# Patient Record
Sex: MALE | Race: WHITE | NOT HISPANIC OR LATINO | Employment: UNEMPLOYED | URBAN - NONMETROPOLITAN AREA
[De-identification: names, ages, dates, MRNs, and addresses within clinical notes are randomized per-mention and may not be internally consistent; named-entity substitution may affect disease eponyms.]

---

## 2018-02-22 ENCOUNTER — APPOINTMENT (OUTPATIENT)
Dept: NUCLEAR MEDICINE | Facility: HOSPITAL | Age: 43
End: 2018-02-22

## 2018-02-22 ENCOUNTER — APPOINTMENT (OUTPATIENT)
Dept: GENERAL RADIOLOGY | Facility: HOSPITAL | Age: 43
End: 2018-02-22

## 2018-02-22 ENCOUNTER — HOSPITAL ENCOUNTER (INPATIENT)
Facility: HOSPITAL | Age: 43
LOS: 4 days | Discharge: HOME HEALTH | End: 2018-02-27
Attending: EMERGENCY MEDICINE | Admitting: HOSPITALIST

## 2018-02-22 DIAGNOSIS — J96.01 ACUTE RESPIRATORY FAILURE WITH HYPOXIA AND HYPERCAPNIA (HCC): Primary | ICD-10-CM

## 2018-02-22 DIAGNOSIS — J18.9 COMMUNITY ACQUIRED PNEUMONIA, UNSPECIFIED LATERALITY: ICD-10-CM

## 2018-02-22 DIAGNOSIS — J96.02 ACUTE RESPIRATORY FAILURE WITH HYPOXIA AND HYPERCAPNIA (HCC): Primary | ICD-10-CM

## 2018-02-22 LAB
A-A DO2: 24 MMHG (ref 0–300)
A-A DO2: 55.6 MMHG (ref 0–300)
ALBUMIN SERPL-MCNC: 3.8 G/DL (ref 3.5–5)
ALBUMIN/GLOB SERPL: 1.5 G/DL (ref 1.5–2.5)
ALP SERPL-CCNC: 87 U/L (ref 40–129)
ALT SERPL W P-5'-P-CCNC: 20 U/L (ref 10–44)
ANION GAP SERPL CALCULATED.3IONS-SCNC: 4.9 MMOL/L (ref 3.6–11.2)
ARTERIAL PATENCY WRIST A: POSITIVE
ARTERIAL PATENCY WRIST A: POSITIVE
AST SERPL-CCNC: 16 U/L (ref 10–34)
ATMOSPHERIC PRESS: 728 MMHG
ATMOSPHERIC PRESS: 728 MMHG
BACTERIA UR QL AUTO: ABNORMAL /HPF
BASE EXCESS BLDA CALC-SCNC: 0.2 MMOL/L
BASE EXCESS BLDA CALC-SCNC: 1.5 MMOL/L
BASOPHILS # BLD AUTO: 0.07 10*3/MM3 (ref 0–0.3)
BASOPHILS NFR BLD AUTO: 0.7 % (ref 0–2)
BDY SITE: ABNORMAL
BDY SITE: ABNORMAL
BILIRUB SERPL-MCNC: 0.2 MG/DL (ref 0.2–1.8)
BILIRUB UR QL STRIP: NEGATIVE
BNP SERPL-MCNC: 322 PG/ML (ref 0–100)
BODY TEMPERATURE: 98.6 C
BODY TEMPERATURE: 98.6 C
BUN BLD-MCNC: 15 MG/DL (ref 7–21)
BUN/CREAT SERPL: 31.9 (ref 7–25)
CALCIUM SPEC-SCNC: 8.4 MG/DL (ref 7.7–10)
CHLORIDE SERPL-SCNC: 96 MMOL/L (ref 99–112)
CLARITY UR: ABNORMAL
CO2 SERPL-SCNC: 30.1 MMOL/L (ref 24.3–31.9)
COHGB MFR BLD: 1.7 % (ref 0–5)
COHGB MFR BLD: 2 % (ref 0–5)
COLOR UR: ABNORMAL
CREAT BLD-MCNC: 0.47 MG/DL (ref 0.43–1.29)
D DIMER PPP FEU-MCNC: 0.7 MCGFEU/ML (ref 0–0.5)
D-LACTATE SERPL-SCNC: 0.7 MMOL/L (ref 0.5–2)
DEPRECATED RDW RBC AUTO: 49.1 FL (ref 37–54)
EOSINOPHIL # BLD AUTO: 0.11 10*3/MM3 (ref 0–0.7)
EOSINOPHIL NFR BLD AUTO: 1 % (ref 0–5)
ERYTHROCYTE [DISTWIDTH] IN BLOOD BY AUTOMATED COUNT: 16.6 % (ref 11.5–14.5)
FLUAV AG NPH QL: NEGATIVE
FLUBV AG NPH QL IA: NEGATIVE
GFR SERPL CREATININE-BSD FRML MDRD: >150 ML/MIN/1.73
GLOBULIN UR ELPH-MCNC: 2.5 GM/DL
GLUCOSE BLD-MCNC: 197 MG/DL (ref 70–110)
GLUCOSE UR STRIP-MCNC: ABNORMAL MG/DL
HCO3 BLDA-SCNC: 27.1 MMOL/L (ref 22–26)
HCO3 BLDA-SCNC: 29.4 MMOL/L (ref 22–26)
HCT VFR BLD AUTO: 36.1 % (ref 42–52)
HCT VFR BLD CALC: 37 % (ref 42–52)
HCT VFR BLD CALC: 38 % (ref 42–52)
HGB BLD-MCNC: 11.2 G/DL (ref 14–18)
HGB BLDA-MCNC: 12.7 G/DL (ref 12–16)
HGB BLDA-MCNC: 13 G/DL (ref 12–16)
HGB UR QL STRIP.AUTO: ABNORMAL
HOROWITZ INDEX BLD+IHG-RTO: 21 %
HOROWITZ INDEX BLD+IHG-RTO: 28 %
HYALINE CASTS UR QL AUTO: ABNORMAL /LPF
IMM GRANULOCYTES # BLD: 0.13 10*3/MM3 (ref 0–0.03)
IMM GRANULOCYTES NFR BLD: 1.2 % (ref 0–0.5)
KETONES UR QL STRIP: NEGATIVE
LEUKOCYTE ESTERASE UR QL STRIP.AUTO: ABNORMAL
LIPASE SERPL-CCNC: 26 U/L (ref 13–60)
LYMPHOCYTES # BLD AUTO: 1.4 10*3/MM3 (ref 1–3)
LYMPHOCYTES NFR BLD AUTO: 13.3 % (ref 21–51)
MCH RBC QN AUTO: 25.1 PG (ref 27–33)
MCHC RBC AUTO-ENTMCNC: 31 G/DL (ref 33–37)
MCV RBC AUTO: 80.9 FL (ref 80–94)
METHGB BLD QL: 0.4 % (ref 0–3)
METHGB BLD QL: 0.5 % (ref 0–3)
MODALITY: ABNORMAL
MODALITY: ABNORMAL
MONOCYTES # BLD AUTO: 0.95 10*3/MM3 (ref 0.1–0.9)
MONOCYTES NFR BLD AUTO: 9 % (ref 0–10)
NEUTROPHILS # BLD AUTO: 7.85 10*3/MM3 (ref 1.4–6.5)
NEUTROPHILS NFR BLD AUTO: 74.8 % (ref 30–70)
NITRITE UR QL STRIP: NEGATIVE
OSMOLALITY SERPL CALC.SUM OF ELEC: 269 MOSM/KG (ref 273–305)
OXYHGB MFR BLDV: 84.5 % (ref 85–100)
OXYHGB MFR BLDV: 87.2 % (ref 85–100)
PCO2 BLDA: 53.6 MM HG (ref 35–45)
PCO2 BLDA: 61.7 MM HG (ref 35–45)
PH BLDA: 7.3 PH UNITS (ref 7.35–7.45)
PH BLDA: 7.32 PH UNITS (ref 7.35–7.45)
PH UR STRIP.AUTO: <=5 [PH] (ref 5–8)
PHENYTOIN SERPL-MCNC: <0.5 MCG/ML (ref 10–20)
PLATELET # BLD AUTO: 178 10*3/MM3 (ref 130–400)
PMV BLD AUTO: 10.3 FL (ref 6–10)
PO2 BLDA: 54.8 MM HG (ref 80–100)
PO2 BLDA: 62.3 MM HG (ref 80–100)
POTASSIUM BLD-SCNC: 4.3 MMOL/L (ref 3.5–5.3)
PROT SERPL-MCNC: 6.3 G/DL (ref 6–8)
PROT UR QL STRIP: ABNORMAL
RBC # BLD AUTO: 4.46 10*6/MM3 (ref 4.7–6.1)
RBC # UR: ABNORMAL /HPF
REF LAB TEST METHOD: ABNORMAL
SAO2 % BLDCOA: 86.3 % (ref 90–100)
SAO2 % BLDCOA: 89.4 % (ref 90–100)
SODIUM BLD-SCNC: 131 MMOL/L (ref 135–153)
SP GR UR STRIP: 1.02 (ref 1–1.03)
SQUAMOUS #/AREA URNS HPF: ABNORMAL /HPF
TROPONIN I SERPL-MCNC: 0.05 NG/ML
UROBILINOGEN UR QL STRIP: ABNORMAL
WBC NRBC COR # BLD: 10.51 10*3/MM3 (ref 4.5–12.5)
WBC UR QL AUTO: ABNORMAL /HPF

## 2018-02-22 PROCEDURE — 25010000002 METHYLPREDNISOLONE PER 125 MG: Performed by: EMERGENCY MEDICINE

## 2018-02-22 PROCEDURE — 83880 ASSAY OF NATRIURETIC PEPTIDE: CPT | Performed by: EMERGENCY MEDICINE

## 2018-02-22 PROCEDURE — 78582 LUNG VENTILAT&PERFUS IMAGING: CPT

## 2018-02-22 PROCEDURE — 36600 WITHDRAWAL OF ARTERIAL BLOOD: CPT | Performed by: EMERGENCY MEDICINE

## 2018-02-22 PROCEDURE — 25010000002 CEFTRIAXONE: Performed by: EMERGENCY MEDICINE

## 2018-02-22 PROCEDURE — 81001 URINALYSIS AUTO W/SCOPE: CPT | Performed by: EMERGENCY MEDICINE

## 2018-02-22 PROCEDURE — 78582 LUNG VENTILAT&PERFUS IMAGING: CPT | Performed by: RADIOLOGY

## 2018-02-22 PROCEDURE — 87040 BLOOD CULTURE FOR BACTERIA: CPT | Performed by: EMERGENCY MEDICINE

## 2018-02-22 PROCEDURE — 83690 ASSAY OF LIPASE: CPT | Performed by: EMERGENCY MEDICINE

## 2018-02-22 PROCEDURE — 84484 ASSAY OF TROPONIN QUANT: CPT | Performed by: EMERGENCY MEDICINE

## 2018-02-22 PROCEDURE — 0 TECHNETIUM ALBUMIN AGGREGATED: Performed by: EMERGENCY MEDICINE

## 2018-02-22 PROCEDURE — 83605 ASSAY OF LACTIC ACID: CPT | Performed by: EMERGENCY MEDICINE

## 2018-02-22 PROCEDURE — 87086 URINE CULTURE/COLONY COUNT: CPT | Performed by: EMERGENCY MEDICINE

## 2018-02-22 PROCEDURE — 94799 UNLISTED PULMONARY SVC/PX: CPT

## 2018-02-22 PROCEDURE — 82375 ASSAY CARBOXYHB QUANT: CPT | Performed by: EMERGENCY MEDICINE

## 2018-02-22 PROCEDURE — 87186 SC STD MICRODIL/AGAR DIL: CPT | Performed by: EMERGENCY MEDICINE

## 2018-02-22 PROCEDURE — 80185 ASSAY OF PHENYTOIN TOTAL: CPT | Performed by: EMERGENCY MEDICINE

## 2018-02-22 PROCEDURE — 83050 HGB METHEMOGLOBIN QUAN: CPT | Performed by: EMERGENCY MEDICINE

## 2018-02-22 PROCEDURE — 85379 FIBRIN DEGRADATION QUANT: CPT | Performed by: EMERGENCY MEDICINE

## 2018-02-22 PROCEDURE — 71045 X-RAY EXAM CHEST 1 VIEW: CPT

## 2018-02-22 PROCEDURE — 80053 COMPREHEN METABOLIC PANEL: CPT | Performed by: EMERGENCY MEDICINE

## 2018-02-22 PROCEDURE — 93010 ELECTROCARDIOGRAM REPORT: CPT | Performed by: INTERNAL MEDICINE

## 2018-02-22 PROCEDURE — 71045 X-RAY EXAM CHEST 1 VIEW: CPT | Performed by: RADIOLOGY

## 2018-02-22 PROCEDURE — 85025 COMPLETE CBC W/AUTO DIFF WBC: CPT | Performed by: EMERGENCY MEDICINE

## 2018-02-22 PROCEDURE — 87804 INFLUENZA ASSAY W/OPTIC: CPT | Performed by: EMERGENCY MEDICINE

## 2018-02-22 PROCEDURE — 93005 ELECTROCARDIOGRAM TRACING: CPT | Performed by: EMERGENCY MEDICINE

## 2018-02-22 PROCEDURE — 94640 AIRWAY INHALATION TREATMENT: CPT

## 2018-02-22 PROCEDURE — 82805 BLOOD GASES W/O2 SATURATION: CPT | Performed by: EMERGENCY MEDICINE

## 2018-02-22 PROCEDURE — 94660 CPAP INITIATION&MGMT: CPT

## 2018-02-22 PROCEDURE — A9540 TC99M MAA: HCPCS | Performed by: EMERGENCY MEDICINE

## 2018-02-22 PROCEDURE — 99285 EMERGENCY DEPT VISIT HI MDM: CPT

## 2018-02-22 RX ORDER — LACTULOSE 10 G/15ML
20 SOLUTION ORAL 2 TIMES DAILY
COMMUNITY
End: 2018-04-11

## 2018-02-22 RX ORDER — DANTROLENE SODIUM 25 MG/1
25 CAPSULE ORAL EVERY 12 HOURS SCHEDULED
Status: DISCONTINUED | OUTPATIENT
Start: 2018-02-22 | End: 2018-02-23

## 2018-02-22 RX ORDER — LEVETIRACETAM 750 MG/1
750 TABLET ORAL EVERY MORNING
Status: ON HOLD | COMMUNITY
End: 2018-02-27

## 2018-02-22 RX ORDER — IPRATROPIUM BROMIDE AND ALBUTEROL SULFATE 2.5; .5 MG/3ML; MG/3ML
SOLUTION RESPIRATORY (INHALATION)
Status: COMPLETED
Start: 2018-02-22 | End: 2018-02-22

## 2018-02-22 RX ORDER — PANTOPRAZOLE SODIUM 40 MG/1
40 TABLET, DELAYED RELEASE ORAL EVERY MORNING
COMMUNITY
End: 2018-05-02 | Stop reason: HOSPADM

## 2018-02-22 RX ORDER — CITALOPRAM 20 MG/1
20 TABLET ORAL NIGHTLY
Status: DISCONTINUED | OUTPATIENT
Start: 2018-02-22 | End: 2018-02-27 | Stop reason: HOSPADM

## 2018-02-22 RX ORDER — IPRATROPIUM BROMIDE AND ALBUTEROL SULFATE 2.5; .5 MG/3ML; MG/3ML
3 SOLUTION RESPIRATORY (INHALATION) ONCE
Status: COMPLETED | OUTPATIENT
Start: 2018-02-22 | End: 2018-02-22

## 2018-02-22 RX ORDER — METHYLPREDNISOLONE SODIUM SUCCINATE 125 MG/2ML
125 INJECTION, POWDER, LYOPHILIZED, FOR SOLUTION INTRAMUSCULAR; INTRAVENOUS ONCE
Status: COMPLETED | OUTPATIENT
Start: 2018-02-22 | End: 2018-02-22

## 2018-02-22 RX ORDER — SODIUM CHLORIDE 0.9 % (FLUSH) 0.9 %
10 SYRINGE (ML) INJECTION AS NEEDED
Status: DISCONTINUED | OUTPATIENT
Start: 2018-02-22 | End: 2018-02-27 | Stop reason: HOSPADM

## 2018-02-22 RX ORDER — CITALOPRAM 20 MG/1
20 TABLET ORAL NIGHTLY
COMMUNITY
End: 2018-05-02 | Stop reason: HOSPADM

## 2018-02-22 RX ORDER — BACLOFEN 20 MG/1
20 TABLET ORAL 2 TIMES DAILY
COMMUNITY
End: 2018-05-02 | Stop reason: HOSPADM

## 2018-02-22 RX ORDER — IPRATROPIUM BROMIDE AND ALBUTEROL SULFATE 2.5; .5 MG/3ML; MG/3ML
3 SOLUTION RESPIRATORY (INHALATION)
Status: COMPLETED | OUTPATIENT
Start: 2018-02-22 | End: 2018-02-22

## 2018-02-22 RX ORDER — OXCARBAZEPINE 300 MG/1
600 TABLET, FILM COATED ORAL EVERY 12 HOURS SCHEDULED
Status: DISCONTINUED | OUTPATIENT
Start: 2018-02-22 | End: 2018-02-23

## 2018-02-22 RX ORDER — OXCARBAZEPINE 300 MG/1
1200 TABLET, FILM COATED ORAL 2 TIMES DAILY
COMMUNITY
End: 2018-02-27 | Stop reason: HOSPADM

## 2018-02-22 RX ORDER — MEGESTROL ACETATE 40 MG/1
40 TABLET ORAL DAILY
COMMUNITY
End: 2018-02-23

## 2018-02-22 RX ORDER — LEVOTHYROXINE SODIUM 0.07 MG/1
75 TABLET ORAL EVERY MORNING
COMMUNITY
End: 2018-05-02 | Stop reason: HOSPADM

## 2018-02-22 RX ORDER — ATORVASTATIN CALCIUM 10 MG/1
10 TABLET, FILM COATED ORAL ONCE
Status: COMPLETED | OUTPATIENT
Start: 2018-02-22 | End: 2018-02-23

## 2018-02-22 RX ADMIN — IPRATROPIUM BROMIDE AND ALBUTEROL SULFATE 3 ML: .5; 3 SOLUTION RESPIRATORY (INHALATION) at 17:38

## 2018-02-22 RX ADMIN — IPRATROPIUM BROMIDE AND ALBUTEROL SULFATE 3 ML: .5; 3 SOLUTION RESPIRATORY (INHALATION) at 18:11

## 2018-02-22 RX ADMIN — METHYLPREDNISOLONE SODIUM SUCCINATE 125 MG: 125 INJECTION, POWDER, FOR SOLUTION INTRAMUSCULAR; INTRAVENOUS at 18:30

## 2018-02-22 RX ADMIN — IPRATROPIUM BROMIDE AND ALBUTEROL SULFATE 3 ML: .5; 3 SOLUTION RESPIRATORY (INHALATION) at 18:46

## 2018-02-22 RX ADMIN — Medication 1 DOSE: at 21:25

## 2018-02-22 RX ADMIN — DOXYCYCLINE 100 MG: 100 INJECTION, POWDER, LYOPHILIZED, FOR SOLUTION INTRAVENOUS at 23:41

## 2018-02-22 RX ADMIN — CEFTRIAXONE 1 G: 1 INJECTION, POWDER, FOR SOLUTION INTRAMUSCULAR; INTRAVENOUS at 22:17

## 2018-02-23 ENCOUNTER — APPOINTMENT (OUTPATIENT)
Dept: CT IMAGING | Facility: HOSPITAL | Age: 43
End: 2018-02-23

## 2018-02-23 ENCOUNTER — APPOINTMENT (OUTPATIENT)
Dept: GENERAL RADIOLOGY | Facility: HOSPITAL | Age: 43
End: 2018-02-23

## 2018-02-23 ENCOUNTER — APPOINTMENT (OUTPATIENT)
Dept: CARDIOLOGY | Facility: HOSPITAL | Age: 43
End: 2018-02-23
Attending: HOSPITALIST

## 2018-02-23 PROBLEM — J96.02 ACUTE RESPIRATORY FAILURE WITH HYPOXIA AND HYPERCAPNIA (HCC): Status: ACTIVE | Noted: 2018-02-23

## 2018-02-23 PROBLEM — J96.01 ACUTE RESPIRATORY FAILURE WITH HYPOXIA AND HYPERCAPNIA (HCC): Status: ACTIVE | Noted: 2018-02-23

## 2018-02-23 LAB
A-A DO2: 63.8 MMHG (ref 0–300)
A-A DO2: 63.8 MMHG (ref 0–300)
ALBUMIN SERPL-MCNC: 3.9 G/DL (ref 3.5–5)
ALBUMIN/GLOB SERPL: 1.6 G/DL (ref 1.5–2.5)
ALP SERPL-CCNC: 90 U/L (ref 40–129)
ALT SERPL W P-5'-P-CCNC: 19 U/L (ref 10–44)
ANION GAP SERPL CALCULATED.3IONS-SCNC: 7 MMOL/L (ref 3.6–11.2)
ARTERIAL PATENCY WRIST A: POSITIVE
ARTERIAL PATENCY WRIST A: POSITIVE
AST SERPL-CCNC: 17 U/L (ref 10–34)
ATMOSPHERIC PRESS: 728 MMHG
ATMOSPHERIC PRESS: 728 MMHG
BASE EXCESS BLDA CALC-SCNC: 0.5 MMOL/L
BASE EXCESS BLDA CALC-SCNC: 4.2 MMOL/L
BASOPHILS # BLD AUTO: 0.04 10*3/MM3 (ref 0–0.3)
BASOPHILS NFR BLD AUTO: 0.4 % (ref 0–2)
BDY SITE: ABNORMAL
BDY SITE: ABNORMAL
BH CV ECHO MEAS - % IVS THICK: 40.9 %
BH CV ECHO MEAS - % LVPW THICK: 28 %
BH CV ECHO MEAS - ACS: 1.7 CM
BH CV ECHO MEAS - AO MAX PG: 4.3 MMHG
BH CV ECHO MEAS - AO MEAN PG: 2.4 MMHG
BH CV ECHO MEAS - AO ROOT AREA (BSA CORRECTED): 1.6
BH CV ECHO MEAS - AO ROOT AREA: 6.4 CM^2
BH CV ECHO MEAS - AO ROOT DIAM: 2.9 CM
BH CV ECHO MEAS - AO V2 MAX: 103.2 CM/SEC
BH CV ECHO MEAS - AO V2 MEAN: 72.9 CM/SEC
BH CV ECHO MEAS - AO V2 VTI: 19.5 CM
BH CV ECHO MEAS - BSA(HAYCOCK): 1.9 M^2
BH CV ECHO MEAS - BSA: 1.8 M^2
BH CV ECHO MEAS - BZI_BMI: 26.6 KILOGRAMS/M^2
BH CV ECHO MEAS - BZI_METRIC_HEIGHT: 167.6 CM
BH CV ECHO MEAS - BZI_METRIC_WEIGHT: 74.8 KG
BH CV ECHO MEAS - EDV(CUBED): 122.4 ML
BH CV ECHO MEAS - EDV(TEICH): 116.3 ML
BH CV ECHO MEAS - EF(CUBED): 36.1 %
BH CV ECHO MEAS - EF(TEICH): 29.5 %
BH CV ECHO MEAS - ESV(CUBED): 78.1 ML
BH CV ECHO MEAS - ESV(TEICH): 81.9 ML
BH CV ECHO MEAS - FS: 13.9 %
BH CV ECHO MEAS - IVS/LVPW: 0.94
BH CV ECHO MEAS - IVSD: 1 CM
BH CV ECHO MEAS - IVSS: 1.4 CM
BH CV ECHO MEAS - LA DIMENSION: 2.3 CM
BH CV ECHO MEAS - LA/AO: 0.81
BH CV ECHO MEAS - LV MASS(C)D: 188.8 GRAMS
BH CV ECHO MEAS - LV MASS(C)DI: 102.4 GRAMS/M^2
BH CV ECHO MEAS - LV MASS(C)S: 228.1 GRAMS
BH CV ECHO MEAS - LV MASS(C)SI: 123.8 GRAMS/M^2
BH CV ECHO MEAS - LVIDD: 5 CM
BH CV ECHO MEAS - LVIDS: 4.3 CM
BH CV ECHO MEAS - LVOT AREA (M): 2.5 CM^2
BH CV ECHO MEAS - LVOT AREA: 2.5 CM^2
BH CV ECHO MEAS - LVOT DIAM: 1.8 CM
BH CV ECHO MEAS - LVPWD: 1.1 CM
BH CV ECHO MEAS - LVPWS: 1.4 CM
BH CV ECHO MEAS - MV A MAX VEL: 52.3 CM/SEC
BH CV ECHO MEAS - MV E MAX VEL: 42.4 CM/SEC
BH CV ECHO MEAS - MV E/A: 0.81
BH CV ECHO MEAS - PA ACC SLOPE: 771 CM/SEC^2
BH CV ECHO MEAS - PA ACC TIME: 0.12 SEC
BH CV ECHO MEAS - PA PR(ACCEL): 25.1 MMHG
BH CV ECHO MEAS - RAP SYSTOLE: 10 MMHG
BH CV ECHO MEAS - RVDD: 0.97 CM
BH CV ECHO MEAS - RVSP: 37.2 MMHG
BH CV ECHO MEAS - SI(AO): 68.2 ML/M^2
BH CV ECHO MEAS - SI(CUBED): 24 ML/M^2
BH CV ECHO MEAS - SI(TEICH): 18.6 ML/M^2
BH CV ECHO MEAS - SV(AO): 125.7 ML
BH CV ECHO MEAS - SV(CUBED): 44.2 ML
BH CV ECHO MEAS - SV(TEICH): 34.4 ML
BH CV ECHO MEAS - TR MAX VEL: 260.6 CM/SEC
BILIRUB SERPL-MCNC: 0.2 MG/DL (ref 0.2–1.8)
BNP SERPL-MCNC: 262 PG/ML (ref 0–100)
BODY TEMPERATURE: 98.6 C
BODY TEMPERATURE: 98.6 C
BUN BLD-MCNC: 8 MG/DL (ref 7–21)
BUN/CREAT SERPL: 17.8 (ref 7–25)
CALCIUM SPEC-SCNC: 8.8 MG/DL (ref 7.7–10)
CHLORIDE SERPL-SCNC: 100 MMOL/L (ref 99–112)
CK MB SERPL-CCNC: 0.93 NG/ML (ref 0–5)
CK MB SERPL-CCNC: 0.95 NG/ML (ref 0–5)
CK MB SERPL-CCNC: 1.85 NG/ML (ref 0–5)
CK MB SERPL-RTO: 5 % (ref 0–3)
CK MB SERPL-RTO: 5.5 % (ref 0–3)
CK MB SERPL-RTO: 6.4 % (ref 0–3)
CK SERPL-CCNC: 17 U/L (ref 24–204)
CK SERPL-CCNC: 19 U/L (ref 24–204)
CK SERPL-CCNC: 29 U/L (ref 24–204)
CO2 SERPL-SCNC: 28 MMOL/L (ref 24.3–31.9)
COHGB MFR BLD: 2 % (ref 0–5)
COHGB MFR BLD: 2.1 % (ref 0–5)
CREAT BLD-MCNC: 0.45 MG/DL (ref 0.43–1.29)
CRP SERPL-MCNC: 6.18 MG/DL (ref 0–0.99)
CRP SERPL-MCNC: 7.89 MG/DL (ref 0–0.99)
DEPRECATED RDW RBC AUTO: 48.4 FL (ref 37–54)
EOSINOPHIL # BLD AUTO: 0.04 10*3/MM3 (ref 0–0.7)
EOSINOPHIL NFR BLD AUTO: 0.4 % (ref 0–5)
EPAP: 8
ERYTHROCYTE [DISTWIDTH] IN BLOOD BY AUTOMATED COUNT: 16.6 % (ref 11.5–14.5)
GFR SERPL CREATININE-BSD FRML MDRD: >150 ML/MIN/1.73
GLOBULIN UR ELPH-MCNC: 2.5 GM/DL
GLUCOSE BLD-MCNC: 216 MG/DL (ref 70–110)
HBA1C MFR BLD: 6.9 % (ref 4.5–5.7)
HCO3 BLDA-SCNC: 26 MMOL/L (ref 22–26)
HCO3 BLDA-SCNC: 31.6 MMOL/L (ref 22–26)
HCT VFR BLD AUTO: 37.7 % (ref 42–52)
HCT VFR BLD CALC: 34 % (ref 42–52)
HCT VFR BLD CALC: 39 % (ref 42–52)
HGB BLD-MCNC: 11.5 G/DL (ref 14–18)
HGB BLDA-MCNC: 11.4 G/DL (ref 12–16)
HGB BLDA-MCNC: 13.3 G/DL (ref 12–16)
HOROWITZ INDEX BLD+IHG-RTO: 35 %
HOROWITZ INDEX BLD+IHG-RTO: 40 %
IMM GRANULOCYTES # BLD: 0.19 10*3/MM3 (ref 0–0.03)
IMM GRANULOCYTES NFR BLD: 2 % (ref 0–0.5)
IPAP: 18
L PNEUMO1 AG UR QL IA: NEGATIVE
LYMPHOCYTES # BLD AUTO: 0.86 10*3/MM3 (ref 1–3)
LYMPHOCYTES NFR BLD AUTO: 8.9 % (ref 21–51)
M PNEUMO IGM SER QL: NEGATIVE
MAXIMAL PREDICTED HEART RATE: 176 BPM
MCH RBC QN AUTO: 24.9 PG (ref 27–33)
MCHC RBC AUTO-ENTMCNC: 30.5 G/DL (ref 33–37)
MCV RBC AUTO: 81.8 FL (ref 80–94)
METHGB BLD QL: 0.2 % (ref 0–3)
METHGB BLD QL: 0.4 % (ref 0–3)
MODALITY: ABNORMAL
MODALITY: ABNORMAL
MONOCYTES # BLD AUTO: 0.58 10*3/MM3 (ref 0.1–0.9)
MONOCYTES NFR BLD AUTO: 6 % (ref 0–10)
MYOGLOBIN SERPL-MCNC: 13 NG/ML (ref 0–109)
MYOGLOBIN SERPL-MCNC: 15 NG/ML (ref 0–109)
MYOGLOBIN SERPL-MCNC: 25 NG/ML (ref 0–109)
NEUTROPHILS # BLD AUTO: 7.97 10*3/MM3 (ref 1.4–6.5)
NEUTROPHILS NFR BLD AUTO: 82.3 % (ref 30–70)
OSMOLALITY SERPL CALC.SUM OF ELEC: 275 MOSM/KG (ref 273–305)
OXYHGB MFR BLDV: 96.3 % (ref 85–100)
OXYHGB MFR BLDV: 96.6 % (ref 85–100)
PCO2 BLDA: 45 MM HG (ref 35–45)
PCO2 BLDA: 62 MM HG (ref 35–45)
PH BLDA: 7.33 PH UNITS (ref 7.35–7.45)
PH BLDA: 7.38 PH UNITS (ref 7.35–7.45)
PLATELET # BLD AUTO: 195 10*3/MM3 (ref 130–400)
PMV BLD AUTO: 10.6 FL (ref 6–10)
PO2 BLDA: 122.2 MM HG (ref 80–100)
PO2 BLDA: 137.3 MM HG (ref 80–100)
POTASSIUM BLD-SCNC: 4.5 MMOL/L (ref 3.5–5.3)
PROT SERPL-MCNC: 6.4 G/DL (ref 6–8)
RBC # BLD AUTO: 4.61 10*6/MM3 (ref 4.7–6.1)
SAO2 % BLDCOA: 98.7 % (ref 90–100)
SAO2 % BLDCOA: 98.9 % (ref 90–100)
SET MECH RESP RATE: 18
SODIUM BLD-SCNC: 135 MMOL/L (ref 135–153)
STRESS TARGET HR: 150 BPM
TROPONIN I SERPL-MCNC: 0.02 NG/ML
TROPONIN I SERPL-MCNC: 0.02 NG/ML
TROPONIN I SERPL-MCNC: 0.03 NG/ML
WBC NRBC COR # BLD: 9.68 10*3/MM3 (ref 4.5–12.5)

## 2018-02-23 PROCEDURE — 94799 UNLISTED PULMONARY SVC/PX: CPT

## 2018-02-23 PROCEDURE — 86738 MYCOPLASMA ANTIBODY: CPT | Performed by: HOSPITALIST

## 2018-02-23 PROCEDURE — 82553 CREATINE MB FRACTION: CPT | Performed by: HOSPITALIST

## 2018-02-23 PROCEDURE — 25010000002 HEPARIN (PORCINE) PER 1000 UNITS: Performed by: HOSPITALIST

## 2018-02-23 PROCEDURE — 25010000002 FUROSEMIDE PER 20 MG

## 2018-02-23 PROCEDURE — 86140 C-REACTIVE PROTEIN: CPT | Performed by: HOSPITALIST

## 2018-02-23 PROCEDURE — 84484 ASSAY OF TROPONIN QUANT: CPT | Performed by: HOSPITALIST

## 2018-02-23 PROCEDURE — 99223 1ST HOSP IP/OBS HIGH 75: CPT | Performed by: HOSPITALIST

## 2018-02-23 PROCEDURE — 83050 HGB METHEMOGLOBIN QUAN: CPT | Performed by: HOSPITALIST

## 2018-02-23 PROCEDURE — 25010000002 CEFTRIAXONE: Performed by: HOSPITALIST

## 2018-02-23 PROCEDURE — G8997 SWALLOW GOAL STATUS: HCPCS

## 2018-02-23 PROCEDURE — 36600 WITHDRAWAL OF ARTERIAL BLOOD: CPT | Performed by: HOSPITALIST

## 2018-02-23 PROCEDURE — 87899 AGENT NOS ASSAY W/OPTIC: CPT | Performed by: HOSPITALIST

## 2018-02-23 PROCEDURE — 82375 ASSAY CARBOXYHB QUANT: CPT | Performed by: HOSPITALIST

## 2018-02-23 PROCEDURE — 83874 ASSAY OF MYOGLOBIN: CPT | Performed by: HOSPITALIST

## 2018-02-23 PROCEDURE — 85025 COMPLETE CBC W/AUTO DIFF WBC: CPT | Performed by: HOSPITALIST

## 2018-02-23 PROCEDURE — 74230 X-RAY XM SWLNG FUNCJ C+: CPT

## 2018-02-23 PROCEDURE — 74230 X-RAY XM SWLNG FUNCJ C+: CPT | Performed by: RADIOLOGY

## 2018-02-23 PROCEDURE — 83880 ASSAY OF NATRIURETIC PEPTIDE: CPT | Performed by: HOSPITALIST

## 2018-02-23 PROCEDURE — 82805 BLOOD GASES W/O2 SATURATION: CPT | Performed by: HOSPITALIST

## 2018-02-23 PROCEDURE — G8998 SWALLOW D/C STATUS: HCPCS

## 2018-02-23 PROCEDURE — 25010000002 ONDANSETRON PER 1 MG

## 2018-02-23 PROCEDURE — 80177 DRUG SCRN QUAN LEVETIRACETAM: CPT | Performed by: HOSPITALIST

## 2018-02-23 PROCEDURE — 80183 DRUG SCRN QUANT OXCARBAZEPIN: CPT | Performed by: HOSPITALIST

## 2018-02-23 PROCEDURE — 82550 ASSAY OF CK (CPK): CPT | Performed by: HOSPITALIST

## 2018-02-23 PROCEDURE — 99222 1ST HOSP IP/OBS MODERATE 55: CPT | Performed by: INTERNAL MEDICINE

## 2018-02-23 PROCEDURE — 0 IOPAMIDOL 61 % SOLUTION: Performed by: EMERGENCY MEDICINE

## 2018-02-23 PROCEDURE — G8996 SWALLOW CURRENT STATUS: HCPCS

## 2018-02-23 PROCEDURE — 93306 TTE W/DOPPLER COMPLETE: CPT

## 2018-02-23 PROCEDURE — 83036 HEMOGLOBIN GLYCOSYLATED A1C: CPT | Performed by: HOSPITALIST

## 2018-02-23 PROCEDURE — 92611 MOTION FLUOROSCOPY/SWALLOW: CPT

## 2018-02-23 PROCEDURE — 71260 CT THORAX DX C+: CPT | Performed by: RADIOLOGY

## 2018-02-23 PROCEDURE — 80053 COMPREHEN METABOLIC PANEL: CPT | Performed by: HOSPITALIST

## 2018-02-23 PROCEDURE — 71260 CT THORAX DX C+: CPT

## 2018-02-23 RX ORDER — SODIUM CHLORIDE 0.9 % (FLUSH) 0.9 %
1-10 SYRINGE (ML) INJECTION AS NEEDED
Status: DISCONTINUED | OUTPATIENT
Start: 2018-02-23 | End: 2018-02-27 | Stop reason: HOSPADM

## 2018-02-23 RX ORDER — LEVETIRACETAM 500 MG/1
1500 TABLET ORAL NIGHTLY
Status: DISCONTINUED | OUTPATIENT
Start: 2018-02-23 | End: 2018-02-24

## 2018-02-23 RX ORDER — OXCARBAZEPINE 300 MG/1
1200 TABLET, FILM COATED ORAL 2 TIMES DAILY
Status: CANCELLED | OUTPATIENT
Start: 2018-02-23

## 2018-02-23 RX ORDER — DANTROLENE SODIUM 25 MG/1
25 CAPSULE ORAL 2 TIMES DAILY
Status: CANCELLED | OUTPATIENT
Start: 2018-02-23

## 2018-02-23 RX ORDER — NITROGLYCERIN 0.4 MG/1
0.4 TABLET SUBLINGUAL
Status: DISCONTINUED | OUTPATIENT
Start: 2018-02-23 | End: 2018-02-27 | Stop reason: HOSPADM

## 2018-02-23 RX ORDER — BACLOFEN 10 MG/1
20 TABLET ORAL 2 TIMES DAILY
Status: CANCELLED | OUTPATIENT
Start: 2018-02-23

## 2018-02-23 RX ORDER — FUROSEMIDE 10 MG/ML
40 INJECTION INTRAMUSCULAR; INTRAVENOUS ONCE
Status: COMPLETED | OUTPATIENT
Start: 2018-02-23 | End: 2018-02-23

## 2018-02-23 RX ORDER — CITALOPRAM 20 MG/1
20 TABLET ORAL NIGHTLY
Status: CANCELLED | OUTPATIENT
Start: 2018-02-23

## 2018-02-23 RX ORDER — MEGESTROL ACETATE 40 MG/ML
200 SUSPENSION ORAL DAILY
Status: DISCONTINUED | OUTPATIENT
Start: 2018-02-23 | End: 2018-02-27 | Stop reason: HOSPADM

## 2018-02-23 RX ORDER — LEVETIRACETAM 750 MG/1
1500 TABLET ORAL NIGHTLY
COMMUNITY
End: 2018-02-27 | Stop reason: HOSPADM

## 2018-02-23 RX ORDER — ATORVASTATIN CALCIUM 10 MG/1
10 TABLET, FILM COATED ORAL DAILY
Status: DISCONTINUED | OUTPATIENT
Start: 2018-02-23 | End: 2018-02-27 | Stop reason: HOSPADM

## 2018-02-23 RX ORDER — MIRTAZAPINE 15 MG/1
45 TABLET, FILM COATED ORAL NIGHTLY
Status: DISCONTINUED | OUTPATIENT
Start: 2018-02-23 | End: 2018-02-23

## 2018-02-23 RX ORDER — LEVOTHYROXINE SODIUM 0.07 MG/1
75 TABLET ORAL
Status: DISCONTINUED | OUTPATIENT
Start: 2018-02-23 | End: 2018-02-27 | Stop reason: HOSPADM

## 2018-02-23 RX ORDER — PANTOPRAZOLE SODIUM 40 MG/1
40 TABLET, DELAYED RELEASE ORAL EVERY MORNING
Status: DISCONTINUED | OUTPATIENT
Start: 2018-02-23 | End: 2018-02-27 | Stop reason: HOSPADM

## 2018-02-23 RX ORDER — HEPARIN SODIUM 5000 [USP'U]/ML
5000 INJECTION, SOLUTION INTRAVENOUS; SUBCUTANEOUS EVERY 12 HOURS SCHEDULED
Status: DISCONTINUED | OUTPATIENT
Start: 2018-02-23 | End: 2018-02-27 | Stop reason: HOSPADM

## 2018-02-23 RX ORDER — MEGESTROL ACETATE 40 MG/ML
400 SUSPENSION ORAL 2 TIMES DAILY
COMMUNITY
End: 2018-05-02 | Stop reason: HOSPADM

## 2018-02-23 RX ORDER — ONDANSETRON 2 MG/ML
INJECTION INTRAMUSCULAR; INTRAVENOUS
Status: COMPLETED
Start: 2018-02-23 | End: 2018-02-23

## 2018-02-23 RX ORDER — L.ACID,PARA/B.BIFIDUM/S.THERM 8B CELL
1 CAPSULE ORAL DAILY
Status: DISCONTINUED | OUTPATIENT
Start: 2018-02-23 | End: 2018-02-27 | Stop reason: HOSPADM

## 2018-02-23 RX ORDER — ONDANSETRON 2 MG/ML
4 INJECTION INTRAMUSCULAR; INTRAVENOUS EVERY 6 HOURS PRN
Status: DISCONTINUED | OUTPATIENT
Start: 2018-02-23 | End: 2018-02-27 | Stop reason: HOSPADM

## 2018-02-23 RX ORDER — FUROSEMIDE 10 MG/ML
INJECTION INTRAMUSCULAR; INTRAVENOUS
Status: COMPLETED
Start: 2018-02-23 | End: 2018-02-23

## 2018-02-23 RX ORDER — MIRTAZAPINE 15 MG/1
30 TABLET, FILM COATED ORAL NIGHTLY
Status: DISCONTINUED | OUTPATIENT
Start: 2018-02-23 | End: 2018-02-24

## 2018-02-23 RX ORDER — LACTULOSE 10 G/15ML
20 SOLUTION ORAL 2 TIMES DAILY
Status: DISCONTINUED | OUTPATIENT
Start: 2018-02-23 | End: 2018-02-27 | Stop reason: HOSPADM

## 2018-02-23 RX ORDER — SODIUM HYPOCHLORITE 1.25 MG/ML
SOLUTION TOPICAL 2 TIMES DAILY
Status: DISCONTINUED | OUTPATIENT
Start: 2018-02-23 | End: 2018-02-27 | Stop reason: HOSPADM

## 2018-02-23 RX ORDER — OXCARBAZEPINE 300 MG/1
600 TABLET, FILM COATED ORAL 2 TIMES DAILY
Status: DISCONTINUED | OUTPATIENT
Start: 2018-02-23 | End: 2018-02-24

## 2018-02-23 RX ADMIN — IPRATROPIUM BROMIDE 0.5 MG: 0.5 SOLUTION RESPIRATORY (INHALATION) at 07:12

## 2018-02-23 RX ADMIN — DOXYCYCLINE 100 MG: 100 INJECTION, POWDER, LYOPHILIZED, FOR SOLUTION INTRAVENOUS at 10:15

## 2018-02-23 RX ADMIN — ONDANSETRON 4 MG: 2 INJECTION, SOLUTION INTRAMUSCULAR; INTRAVENOUS at 17:48

## 2018-02-23 RX ADMIN — METRONIDAZOLE 500 MG: 500 INJECTION, SOLUTION INTRAVENOUS at 06:38

## 2018-02-23 RX ADMIN — IPRATROPIUM BROMIDE 0.5 MG: 0.5 SOLUTION RESPIRATORY (INHALATION) at 14:38

## 2018-02-23 RX ADMIN — ATORVASTATIN CALCIUM 10 MG: 10 TABLET, FILM COATED ORAL at 10:15

## 2018-02-23 RX ADMIN — HEPARIN SODIUM 5000 UNITS: 5000 INJECTION, SOLUTION INTRAVENOUS; SUBCUTANEOUS at 22:41

## 2018-02-23 RX ADMIN — LACTULOSE 20 G: 20 SOLUTION ORAL at 10:16

## 2018-02-23 RX ADMIN — SODIUM HYPOCHLORITE: 1.25 SOLUTION TOPICAL at 16:53

## 2018-02-23 RX ADMIN — LEVETIRACETAM 1500 MG: 500 TABLET, FILM COATED ORAL at 22:41

## 2018-02-23 RX ADMIN — DANTROLENE SODIUM 25 MG: 25 CAPSULE ORAL at 01:00

## 2018-02-23 RX ADMIN — Medication 1 CAPSULE: at 16:52

## 2018-02-23 RX ADMIN — METRONIDAZOLE 500 MG: 500 INJECTION, SOLUTION INTRAVENOUS at 14:50

## 2018-02-23 RX ADMIN — CITALOPRAM HYDROBROMIDE 20 MG: 20 TABLET ORAL at 01:00

## 2018-02-23 RX ADMIN — IPRATROPIUM BROMIDE 0.5 MG: 0.5 SOLUTION RESPIRATORY (INHALATION) at 19:01

## 2018-02-23 RX ADMIN — OXCARBAZEPINE 600 MG: 300 TABLET, FILM COATED ORAL at 10:14

## 2018-02-23 RX ADMIN — CITALOPRAM HYDROBROMIDE 20 MG: 20 TABLET ORAL at 22:41

## 2018-02-23 RX ADMIN — METRONIDAZOLE 500 MG: 500 INJECTION, SOLUTION INTRAVENOUS at 22:42

## 2018-02-23 RX ADMIN — IOPAMIDOL 95 ML: 612 INJECTION, SOLUTION INTRAVENOUS at 00:38

## 2018-02-23 RX ADMIN — MIRTAZAPINE 30 MG: 15 TABLET, FILM COATED ORAL at 22:39

## 2018-02-23 RX ADMIN — CEFTRIAXONE 1 G: 1 INJECTION, POWDER, FOR SOLUTION INTRAMUSCULAR; INTRAVENOUS at 22:42

## 2018-02-23 RX ADMIN — DOXYCYCLINE 100 MG: 100 INJECTION, POWDER, LYOPHILIZED, FOR SOLUTION INTRAVENOUS at 22:42

## 2018-02-23 RX ADMIN — FUROSEMIDE 40 MG: 10 INJECTION, SOLUTION INTRAMUSCULAR; INTRAVENOUS at 10:18

## 2018-02-23 RX ADMIN — LEVETIRACETAM 750 MG: 250 TABLET, FILM COATED ORAL at 01:00

## 2018-02-23 RX ADMIN — SODIUM HYPOCHLORITE: 1.25 SOLUTION TOPICAL at 22:44

## 2018-02-23 RX ADMIN — OXCARBAZEPINE 600 MG: 300 TABLET, FILM COATED ORAL at 00:59

## 2018-02-23 RX ADMIN — FUROSEMIDE 40 MG: 10 INJECTION INTRAMUSCULAR; INTRAVENOUS at 10:18

## 2018-02-23 RX ADMIN — MEGESTROL ACETATE 200 MG: 40 SUSPENSION ORAL at 10:15

## 2018-02-23 RX ADMIN — OXCARBAZEPINE 600 MG: 300 TABLET, FILM COATED ORAL at 22:40

## 2018-02-23 RX ADMIN — HEPARIN SODIUM 5000 UNITS: 5000 INJECTION, SOLUTION INTRAVENOUS; SUBCUTANEOUS at 09:53

## 2018-02-23 RX ADMIN — ATORVASTATIN CALCIUM 10 MG: 10 TABLET, FILM COATED ORAL at 00:59

## 2018-02-24 PROCEDURE — 25010000002 HEPARIN (PORCINE) PER 1000 UNITS: Performed by: HOSPITALIST

## 2018-02-24 PROCEDURE — 25010000002 ONDANSETRON PER 1 MG: Performed by: HOSPITALIST

## 2018-02-24 PROCEDURE — 94799 UNLISTED PULMONARY SVC/PX: CPT

## 2018-02-24 PROCEDURE — 25010000002 CEFTRIAXONE: Performed by: HOSPITALIST

## 2018-02-24 PROCEDURE — 99233 SBSQ HOSP IP/OBS HIGH 50: CPT | Performed by: HOSPITALIST

## 2018-02-24 RX ORDER — LORAZEPAM 2 MG/ML
1 INJECTION INTRAMUSCULAR EVERY 4 HOURS PRN
Status: DISCONTINUED | OUTPATIENT
Start: 2018-02-24 | End: 2018-02-27 | Stop reason: HOSPADM

## 2018-02-24 RX ORDER — OXCARBAZEPINE 300 MG/1
900 TABLET, FILM COATED ORAL EVERY 12 HOURS SCHEDULED
Status: DISCONTINUED | OUTPATIENT
Start: 2018-02-24 | End: 2018-02-27 | Stop reason: HOSPADM

## 2018-02-24 RX ADMIN — OXCARBAZEPINE 900 MG: 300 TABLET, FILM COATED ORAL at 09:19

## 2018-02-24 RX ADMIN — CITALOPRAM HYDROBROMIDE 20 MG: 20 TABLET ORAL at 22:19

## 2018-02-24 RX ADMIN — LEVETIRACETAM 750 MG: 500 TABLET, FILM COATED ORAL at 06:05

## 2018-02-24 RX ADMIN — ATORVASTATIN CALCIUM 10 MG: 10 TABLET, FILM COATED ORAL at 09:18

## 2018-02-24 RX ADMIN — CEFTRIAXONE 1 G: 1 INJECTION, POWDER, FOR SOLUTION INTRAMUSCULAR; INTRAVENOUS at 22:20

## 2018-02-24 RX ADMIN — OXCARBAZEPINE 900 MG: 300 TABLET, FILM COATED ORAL at 22:19

## 2018-02-24 RX ADMIN — LEVOTHYROXINE SODIUM 75 MCG: 75 TABLET ORAL at 06:05

## 2018-02-24 RX ADMIN — MEGESTROL ACETATE 200 MG: 40 SUSPENSION ORAL at 09:22

## 2018-02-24 RX ADMIN — DOXYCYCLINE 100 MG: 100 INJECTION, POWDER, LYOPHILIZED, FOR SOLUTION INTRAVENOUS at 22:20

## 2018-02-24 RX ADMIN — ONDANSETRON 4 MG: 2 INJECTION, SOLUTION INTRAMUSCULAR; INTRAVENOUS at 16:12

## 2018-02-24 RX ADMIN — ONDANSETRON 4 MG: 2 INJECTION, SOLUTION INTRAMUSCULAR; INTRAVENOUS at 22:16

## 2018-02-24 RX ADMIN — HEPARIN SODIUM 5000 UNITS: 5000 INJECTION, SOLUTION INTRAVENOUS; SUBCUTANEOUS at 09:23

## 2018-02-24 RX ADMIN — LACTULOSE 20 G: 20 SOLUTION ORAL at 09:21

## 2018-02-24 RX ADMIN — IPRATROPIUM BROMIDE 0.5 MG: 0.5 SOLUTION RESPIRATORY (INHALATION) at 06:40

## 2018-02-24 RX ADMIN — IPRATROPIUM BROMIDE 0.5 MG: 0.5 SOLUTION RESPIRATORY (INHALATION) at 00:01

## 2018-02-24 RX ADMIN — DOXYCYCLINE 100 MG: 100 INJECTION, POWDER, LYOPHILIZED, FOR SOLUTION INTRAVENOUS at 10:25

## 2018-02-24 RX ADMIN — METRONIDAZOLE 500 MG: 500 INJECTION, SOLUTION INTRAVENOUS at 22:20

## 2018-02-24 RX ADMIN — PANTOPRAZOLE SODIUM 40 MG: 40 TABLET, DELAYED RELEASE ORAL at 06:05

## 2018-02-24 RX ADMIN — IPRATROPIUM BROMIDE 0.5 MG: 0.5 SOLUTION RESPIRATORY (INHALATION) at 19:03

## 2018-02-24 RX ADMIN — Medication 1 CAPSULE: at 09:20

## 2018-02-24 RX ADMIN — SODIUM HYPOCHLORITE: 1.25 SOLUTION TOPICAL at 22:21

## 2018-02-24 RX ADMIN — METRONIDAZOLE 500 MG: 500 INJECTION, SOLUTION INTRAVENOUS at 06:05

## 2018-02-24 RX ADMIN — LEVETIRACETAM 750 MG: 500 TABLET, FILM COATED ORAL at 22:18

## 2018-02-24 RX ADMIN — METRONIDAZOLE 500 MG: 500 INJECTION, SOLUTION INTRAVENOUS at 15:23

## 2018-02-24 RX ADMIN — HEPARIN SODIUM 5000 UNITS: 5000 INJECTION, SOLUTION INTRAVENOUS; SUBCUTANEOUS at 22:19

## 2018-02-24 RX ADMIN — IPRATROPIUM BROMIDE 0.5 MG: 0.5 SOLUTION RESPIRATORY (INHALATION) at 13:30

## 2018-02-25 PROCEDURE — 94799 UNLISTED PULMONARY SVC/PX: CPT

## 2018-02-25 PROCEDURE — 25010000002 HEPARIN (PORCINE) PER 1000 UNITS: Performed by: HOSPITALIST

## 2018-02-25 PROCEDURE — 25010000002 LORAZEPAM PER 2 MG: Performed by: PSYCHIATRY & NEUROLOGY

## 2018-02-25 PROCEDURE — 99233 SBSQ HOSP IP/OBS HIGH 50: CPT | Performed by: INTERNAL MEDICINE

## 2018-02-25 PROCEDURE — 25010000002 CEFTRIAXONE: Performed by: HOSPITALIST

## 2018-02-25 RX ADMIN — METRONIDAZOLE 500 MG: 500 INJECTION, SOLUTION INTRAVENOUS at 21:15

## 2018-02-25 RX ADMIN — LORAZEPAM 1 MG: 2 INJECTION, SOLUTION INTRAMUSCULAR; INTRAVENOUS at 02:14

## 2018-02-25 RX ADMIN — HEPARIN SODIUM 5000 UNITS: 5000 INJECTION, SOLUTION INTRAVENOUS; SUBCUTANEOUS at 21:14

## 2018-02-25 RX ADMIN — LORAZEPAM 1 MG: 2 INJECTION, SOLUTION INTRAMUSCULAR; INTRAVENOUS at 23:01

## 2018-02-25 RX ADMIN — CEFTRIAXONE 1 G: 1 INJECTION, POWDER, FOR SOLUTION INTRAMUSCULAR; INTRAVENOUS at 21:15

## 2018-02-25 RX ADMIN — PANTOPRAZOLE SODIUM 40 MG: 40 TABLET, DELAYED RELEASE ORAL at 06:01

## 2018-02-25 RX ADMIN — IPRATROPIUM BROMIDE 0.5 MG: 0.5 SOLUTION RESPIRATORY (INHALATION) at 00:40

## 2018-02-25 RX ADMIN — DOXYCYCLINE 100 MG: 100 INJECTION, POWDER, LYOPHILIZED, FOR SOLUTION INTRAVENOUS at 22:10

## 2018-02-25 RX ADMIN — SODIUM HYPOCHLORITE: 1.25 SOLUTION TOPICAL at 11:30

## 2018-02-25 RX ADMIN — METRONIDAZOLE 500 MG: 500 INJECTION, SOLUTION INTRAVENOUS at 06:01

## 2018-02-25 RX ADMIN — Medication 1 CAPSULE: at 08:31

## 2018-02-25 RX ADMIN — DOXYCYCLINE 100 MG: 100 INJECTION, POWDER, LYOPHILIZED, FOR SOLUTION INTRAVENOUS at 11:30

## 2018-02-25 RX ADMIN — LEVETIRACETAM 750 MG: 500 TABLET, FILM COATED ORAL at 08:31

## 2018-02-25 RX ADMIN — IPRATROPIUM BROMIDE 0.5 MG: 0.5 SOLUTION RESPIRATORY (INHALATION) at 19:50

## 2018-02-25 RX ADMIN — SODIUM HYPOCHLORITE: 1.25 SOLUTION TOPICAL at 21:15

## 2018-02-25 RX ADMIN — IPRATROPIUM BROMIDE 0.5 MG: 0.5 SOLUTION RESPIRATORY (INHALATION) at 06:47

## 2018-02-25 RX ADMIN — MEGESTROL ACETATE 200 MG: 40 SUSPENSION ORAL at 08:32

## 2018-02-25 RX ADMIN — PANTOPRAZOLE SODIUM 40 MG: 40 TABLET, DELAYED RELEASE ORAL at 06:27

## 2018-02-25 RX ADMIN — LACTULOSE 20 G: 20 SOLUTION ORAL at 08:32

## 2018-02-25 RX ADMIN — HEPARIN SODIUM 5000 UNITS: 5000 INJECTION, SOLUTION INTRAVENOUS; SUBCUTANEOUS at 08:31

## 2018-02-25 RX ADMIN — LEVOTHYROXINE SODIUM 75 MCG: 75 TABLET ORAL at 06:01

## 2018-02-25 RX ADMIN — ATORVASTATIN CALCIUM 10 MG: 10 TABLET, FILM COATED ORAL at 08:31

## 2018-02-25 RX ADMIN — METRONIDAZOLE 500 MG: 500 INJECTION, SOLUTION INTRAVENOUS at 13:34

## 2018-02-25 RX ADMIN — LEVOTHYROXINE SODIUM 75 MCG: 75 TABLET ORAL at 06:28

## 2018-02-25 RX ADMIN — IPRATROPIUM BROMIDE 0.5 MG: 0.5 SOLUTION RESPIRATORY (INHALATION) at 12:11

## 2018-02-25 RX ADMIN — OXCARBAZEPINE 900 MG: 300 TABLET, FILM COATED ORAL at 08:31

## 2018-02-26 LAB
ANION GAP SERPL CALCULATED.3IONS-SCNC: 5.9 MMOL/L (ref 3.6–11.2)
BACTERIA SPEC AEROBE CULT: ABNORMAL
BACTERIA SPEC AEROBE CULT: ABNORMAL
BASOPHILS # BLD AUTO: 0.09 10*3/MM3 (ref 0–0.3)
BASOPHILS NFR BLD AUTO: 1 % (ref 0–2)
BUN BLD-MCNC: 8 MG/DL (ref 7–21)
BUN/CREAT SERPL: 20.5 (ref 7–25)
CALCIUM SPEC-SCNC: 8.6 MG/DL (ref 7.7–10)
CHLORIDE SERPL-SCNC: 107 MMOL/L (ref 99–112)
CO2 SERPL-SCNC: 26.1 MMOL/L (ref 24.3–31.9)
CREAT BLD-MCNC: 0.39 MG/DL (ref 0.43–1.29)
DEPRECATED RDW RBC AUTO: 52.3 FL (ref 37–54)
EOSINOPHIL # BLD AUTO: 0.32 10*3/MM3 (ref 0–0.7)
EOSINOPHIL NFR BLD AUTO: 3.6 % (ref 0–5)
ERYTHROCYTE [DISTWIDTH] IN BLOOD BY AUTOMATED COUNT: 17.9 % (ref 11.5–14.5)
GFR SERPL CREATININE-BSD FRML MDRD: >150 ML/MIN/1.73
GLUCOSE BLD-MCNC: 112 MG/DL (ref 70–110)
HCT VFR BLD AUTO: 43.1 % (ref 42–52)
HGB BLD-MCNC: 13.3 G/DL (ref 14–18)
IMM GRANULOCYTES # BLD: 0.09 10*3/MM3 (ref 0–0.03)
IMM GRANULOCYTES NFR BLD: 1 % (ref 0–0.5)
LEVETIRACETAM SERPL-MCNC: 6.3 UG/ML (ref 10–40)
LYMPHOCYTES # BLD AUTO: 1.84 10*3/MM3 (ref 1–3)
LYMPHOCYTES NFR BLD AUTO: 20.7 % (ref 21–51)
MCH RBC QN AUTO: 25.1 PG (ref 27–33)
MCHC RBC AUTO-ENTMCNC: 30.9 G/DL (ref 33–37)
MCV RBC AUTO: 81.5 FL (ref 80–94)
MONOCYTES # BLD AUTO: 1.03 10*3/MM3 (ref 0.1–0.9)
MONOCYTES NFR BLD AUTO: 11.6 % (ref 0–10)
NEUTROPHILS # BLD AUTO: 5.54 10*3/MM3 (ref 1.4–6.5)
NEUTROPHILS NFR BLD AUTO: 62.1 % (ref 30–70)
OSMOLALITY SERPL CALC.SUM OF ELEC: 276.6 MOSM/KG (ref 273–305)
OXCARBAZEPINE: 38 UG/ML (ref 10–35)
PLATELET # BLD AUTO: 239 10*3/MM3 (ref 130–400)
PMV BLD AUTO: 10.2 FL (ref 6–10)
POTASSIUM BLD-SCNC: 4 MMOL/L (ref 3.5–5.3)
RBC # BLD AUTO: 5.29 10*6/MM3 (ref 4.7–6.1)
SODIUM BLD-SCNC: 139 MMOL/L (ref 135–153)
WBC NRBC COR # BLD: 8.91 10*3/MM3 (ref 4.5–12.5)

## 2018-02-26 PROCEDURE — 94799 UNLISTED PULMONARY SVC/PX: CPT

## 2018-02-26 PROCEDURE — 25010000002 LORAZEPAM PER 2 MG: Performed by: PSYCHIATRY & NEUROLOGY

## 2018-02-26 PROCEDURE — 25010000002 HEPARIN (PORCINE) PER 1000 UNITS: Performed by: HOSPITALIST

## 2018-02-26 PROCEDURE — 25010000002 LEVETRIRACETAM PER 10 MG: Performed by: HOSPITALIST

## 2018-02-26 PROCEDURE — 94660 CPAP INITIATION&MGMT: CPT

## 2018-02-26 PROCEDURE — 85025 COMPLETE CBC W/AUTO DIFF WBC: CPT | Performed by: INTERNAL MEDICINE

## 2018-02-26 PROCEDURE — 25010000002 CEFTRIAXONE: Performed by: HOSPITALIST

## 2018-02-26 PROCEDURE — 80048 BASIC METABOLIC PNL TOTAL CA: CPT | Performed by: INTERNAL MEDICINE

## 2018-02-26 PROCEDURE — 99232 SBSQ HOSP IP/OBS MODERATE 35: CPT | Performed by: INTERNAL MEDICINE

## 2018-02-26 PROCEDURE — 25010000002 ONDANSETRON PER 1 MG: Performed by: HOSPITALIST

## 2018-02-26 RX ADMIN — IPRATROPIUM BROMIDE 0.5 MG: 0.5 SOLUTION RESPIRATORY (INHALATION) at 12:39

## 2018-02-26 RX ADMIN — ONDANSETRON 4 MG: 2 INJECTION, SOLUTION INTRAMUSCULAR; INTRAVENOUS at 18:46

## 2018-02-26 RX ADMIN — SODIUM HYPOCHLORITE: 1.25 SOLUTION TOPICAL at 20:44

## 2018-02-26 RX ADMIN — SODIUM CHLORIDE 750 MG: 9 INJECTION, SOLUTION INTRAVENOUS at 00:26

## 2018-02-26 RX ADMIN — DOXYCYCLINE 100 MG: 100 INJECTION, POWDER, LYOPHILIZED, FOR SOLUTION INTRAVENOUS at 22:03

## 2018-02-26 RX ADMIN — PANTOPRAZOLE SODIUM 40 MG: 40 TABLET, DELAYED RELEASE ORAL at 06:00

## 2018-02-26 RX ADMIN — CEFTRIAXONE 1 G: 1 INJECTION, POWDER, FOR SOLUTION INTRAMUSCULAR; INTRAVENOUS at 21:01

## 2018-02-26 RX ADMIN — IPRATROPIUM BROMIDE 0.5 MG: 0.5 SOLUTION RESPIRATORY (INHALATION) at 00:55

## 2018-02-26 RX ADMIN — IPRATROPIUM BROMIDE 0.5 MG: 0.5 SOLUTION RESPIRATORY (INHALATION) at 20:09

## 2018-02-26 RX ADMIN — LORAZEPAM 1 MG: 2 INJECTION, SOLUTION INTRAMUSCULAR; INTRAVENOUS at 03:08

## 2018-02-26 RX ADMIN — IPRATROPIUM BROMIDE 0.5 MG: 0.5 SOLUTION RESPIRATORY (INHALATION) at 07:13

## 2018-02-26 RX ADMIN — METRONIDAZOLE 500 MG: 500 INJECTION, SOLUTION INTRAVENOUS at 06:00

## 2018-02-26 RX ADMIN — METRONIDAZOLE 500 MG: 500 INJECTION, SOLUTION INTRAVENOUS at 21:01

## 2018-02-26 RX ADMIN — CITALOPRAM HYDROBROMIDE 20 MG: 20 TABLET ORAL at 20:25

## 2018-02-26 RX ADMIN — OXCARBAZEPINE 900 MG: 300 TABLET, FILM COATED ORAL at 09:26

## 2018-02-26 RX ADMIN — LACTULOSE 20 G: 20 SOLUTION ORAL at 20:44

## 2018-02-26 RX ADMIN — SODIUM CHLORIDE 750 MG: 9 INJECTION, SOLUTION INTRAVENOUS at 09:26

## 2018-02-26 RX ADMIN — LACTULOSE 20 G: 20 SOLUTION ORAL at 09:26

## 2018-02-26 RX ADMIN — METRONIDAZOLE 500 MG: 500 INJECTION, SOLUTION INTRAVENOUS at 14:09

## 2018-02-26 RX ADMIN — MEGESTROL ACETATE 200 MG: 40 SUSPENSION ORAL at 09:26

## 2018-02-26 RX ADMIN — LEVOTHYROXINE SODIUM 75 MCG: 75 TABLET ORAL at 06:00

## 2018-02-26 RX ADMIN — OXCARBAZEPINE 900 MG: 300 TABLET, FILM COATED ORAL at 20:24

## 2018-02-26 RX ADMIN — SODIUM HYPOCHLORITE: 1.25 SOLUTION TOPICAL at 09:26

## 2018-02-26 RX ADMIN — HEPARIN SODIUM 5000 UNITS: 5000 INJECTION, SOLUTION INTRAVENOUS; SUBCUTANEOUS at 09:26

## 2018-02-26 RX ADMIN — ATORVASTATIN CALCIUM 10 MG: 10 TABLET, FILM COATED ORAL at 09:26

## 2018-02-26 RX ADMIN — HEPARIN SODIUM 5000 UNITS: 5000 INJECTION, SOLUTION INTRAVENOUS; SUBCUTANEOUS at 20:24

## 2018-02-26 RX ADMIN — DOXYCYCLINE 100 MG: 100 INJECTION, POWDER, LYOPHILIZED, FOR SOLUTION INTRAVENOUS at 11:54

## 2018-02-26 RX ADMIN — SODIUM CHLORIDE 750 MG: 9 INJECTION, SOLUTION INTRAVENOUS at 20:24

## 2018-02-26 RX ADMIN — Medication 1 CAPSULE: at 09:26

## 2018-02-27 VITALS
DIASTOLIC BLOOD PRESSURE: 62 MMHG | OXYGEN SATURATION: 94 % | TEMPERATURE: 99 F | RESPIRATION RATE: 28 BRPM | SYSTOLIC BLOOD PRESSURE: 92 MMHG | HEART RATE: 94 BPM | WEIGHT: 159.8 LBS | BODY MASS INDEX: 25.08 KG/M2 | HEIGHT: 67 IN

## 2018-02-27 PROBLEM — J96.01 ACUTE RESPIRATORY FAILURE WITH HYPOXIA AND HYPERCAPNIA (HCC): Status: RESOLVED | Noted: 2018-02-23 | Resolved: 2018-02-27

## 2018-02-27 PROBLEM — J96.02 ACUTE RESPIRATORY FAILURE WITH HYPOXIA AND HYPERCAPNIA (HCC): Status: RESOLVED | Noted: 2018-02-23 | Resolved: 2018-02-27

## 2018-02-27 LAB
ALBUMIN SERPL-MCNC: 3.7 G/DL (ref 3.5–5)
ALBUMIN/GLOB SERPL: 1.5 G/DL (ref 1.5–2.5)
ALP SERPL-CCNC: 81 U/L (ref 40–129)
ALT SERPL W P-5'-P-CCNC: 38 U/L (ref 10–44)
ANION GAP SERPL CALCULATED.3IONS-SCNC: 6.9 MMOL/L (ref 3.6–11.2)
AST SERPL-CCNC: 38 U/L (ref 10–34)
BACTERIA SPEC AEROBE CULT: NORMAL
BACTERIA SPEC AEROBE CULT: NORMAL
BASOPHILS # BLD AUTO: 0.08 10*3/MM3 (ref 0–0.3)
BASOPHILS NFR BLD AUTO: 0.9 % (ref 0–2)
BILIRUB SERPL-MCNC: 0.2 MG/DL (ref 0.2–1.8)
BUN BLD-MCNC: 9 MG/DL (ref 7–21)
BUN/CREAT SERPL: 26.5 (ref 7–25)
CALCIUM SPEC-SCNC: 8.7 MG/DL (ref 7.7–10)
CHLORIDE SERPL-SCNC: 106 MMOL/L (ref 99–112)
CO2 SERPL-SCNC: 26.1 MMOL/L (ref 24.3–31.9)
CREAT BLD-MCNC: 0.34 MG/DL (ref 0.43–1.29)
CRP SERPL-MCNC: 2.99 MG/DL (ref 0–0.99)
DEPRECATED RDW RBC AUTO: 53.2 FL (ref 37–54)
EOSINOPHIL # BLD AUTO: 0.32 10*3/MM3 (ref 0–0.7)
EOSINOPHIL NFR BLD AUTO: 3.4 % (ref 0–5)
ERYTHROCYTE [DISTWIDTH] IN BLOOD BY AUTOMATED COUNT: 18.1 % (ref 11.5–14.5)
GFR SERPL CREATININE-BSD FRML MDRD: >150 ML/MIN/1.73
GLOBULIN UR ELPH-MCNC: 2.4 GM/DL
GLUCOSE BLD-MCNC: 77 MG/DL (ref 70–110)
HCT VFR BLD AUTO: 42.2 % (ref 42–52)
HGB BLD-MCNC: 12.8 G/DL (ref 14–18)
IMM GRANULOCYTES # BLD: 0.07 10*3/MM3 (ref 0–0.03)
IMM GRANULOCYTES NFR BLD: 0.7 % (ref 0–0.5)
LYMPHOCYTES # BLD AUTO: 2.04 10*3/MM3 (ref 1–3)
LYMPHOCYTES NFR BLD AUTO: 21.8 % (ref 21–51)
MCH RBC QN AUTO: 25 PG (ref 27–33)
MCHC RBC AUTO-ENTMCNC: 30.3 G/DL (ref 33–37)
MCV RBC AUTO: 82.3 FL (ref 80–94)
MONOCYTES # BLD AUTO: 0.93 10*3/MM3 (ref 0.1–0.9)
MONOCYTES NFR BLD AUTO: 9.9 % (ref 0–10)
NEUTROPHILS # BLD AUTO: 5.93 10*3/MM3 (ref 1.4–6.5)
NEUTROPHILS NFR BLD AUTO: 63.3 % (ref 30–70)
OSMOLALITY SERPL CALC.SUM OF ELEC: 275 MOSM/KG (ref 273–305)
PLATELET # BLD AUTO: 244 10*3/MM3 (ref 130–400)
PMV BLD AUTO: 10.5 FL (ref 6–10)
POTASSIUM BLD-SCNC: 4.5 MMOL/L (ref 3.5–5.3)
PROT SERPL-MCNC: 6.1 G/DL (ref 6–8)
RBC # BLD AUTO: 5.13 10*6/MM3 (ref 4.7–6.1)
SODIUM BLD-SCNC: 139 MMOL/L (ref 135–153)
WBC NRBC COR # BLD: 9.37 10*3/MM3 (ref 4.5–12.5)

## 2018-02-27 PROCEDURE — 99231 SBSQ HOSP IP/OBS SF/LOW 25: CPT | Performed by: INTERNAL MEDICINE

## 2018-02-27 PROCEDURE — 99239 HOSP IP/OBS DSCHRG MGMT >30: CPT | Performed by: INTERNAL MEDICINE

## 2018-02-27 PROCEDURE — 94799 UNLISTED PULMONARY SVC/PX: CPT

## 2018-02-27 PROCEDURE — 80053 COMPREHEN METABOLIC PANEL: CPT | Performed by: INTERNAL MEDICINE

## 2018-02-27 PROCEDURE — 86140 C-REACTIVE PROTEIN: CPT | Performed by: INTERNAL MEDICINE

## 2018-02-27 PROCEDURE — 25010000002 HEPARIN (PORCINE) PER 1000 UNITS: Performed by: HOSPITALIST

## 2018-02-27 PROCEDURE — 85025 COMPLETE CBC W/AUTO DIFF WBC: CPT | Performed by: INTERNAL MEDICINE

## 2018-02-27 RX ORDER — OXCARBAZEPINE 300 MG/1
900 TABLET, FILM COATED ORAL EVERY 12 HOURS SCHEDULED
Qty: 60 TABLET | Refills: 0 | Status: SHIPPED | OUTPATIENT
Start: 2018-02-27 | End: 2018-04-11

## 2018-02-27 RX ORDER — LEVETIRACETAM 750 MG/1
750 TABLET ORAL EVERY 12 HOURS SCHEDULED
Qty: 60 TABLET | Refills: 0 | Status: SHIPPED | OUTPATIENT
Start: 2018-02-27 | End: 2018-04-11

## 2018-02-27 RX ORDER — DOXYCYCLINE HYCLATE 50 MG/1
100 CAPSULE ORAL EVERY 12 HOURS SCHEDULED
Qty: 12 CAPSULE | Refills: 0 | Status: SHIPPED | OUTPATIENT
Start: 2018-02-27 | End: 2018-03-02

## 2018-02-27 RX ORDER — METRONIDAZOLE 500 MG/1
500 TABLET ORAL 3 TIMES DAILY
Qty: 9 TABLET | Refills: 0 | Status: SHIPPED | OUTPATIENT
Start: 2018-02-27 | End: 2018-03-02

## 2018-02-27 RX ADMIN — ATORVASTATIN CALCIUM 10 MG: 10 TABLET, FILM COATED ORAL at 08:16

## 2018-02-27 RX ADMIN — OXCARBAZEPINE 900 MG: 300 TABLET, FILM COATED ORAL at 08:15

## 2018-02-27 RX ADMIN — METRONIDAZOLE 500 MG: 500 INJECTION, SOLUTION INTRAVENOUS at 06:17

## 2018-02-27 RX ADMIN — LEVOTHYROXINE SODIUM 75 MCG: 75 TABLET ORAL at 06:18

## 2018-02-27 RX ADMIN — IPRATROPIUM BROMIDE 0.5 MG: 0.5 SOLUTION RESPIRATORY (INHALATION) at 01:37

## 2018-02-27 RX ADMIN — Medication 1 CAPSULE: at 08:15

## 2018-02-27 RX ADMIN — HEPARIN SODIUM 5000 UNITS: 5000 INJECTION, SOLUTION INTRAVENOUS; SUBCUTANEOUS at 08:16

## 2018-02-27 RX ADMIN — PANTOPRAZOLE SODIUM 40 MG: 40 TABLET, DELAYED RELEASE ORAL at 06:18

## 2018-02-27 RX ADMIN — LACTULOSE 20 G: 20 SOLUTION ORAL at 08:16

## 2018-02-27 RX ADMIN — IPRATROPIUM BROMIDE 0.5 MG: 0.5 SOLUTION RESPIRATORY (INHALATION) at 07:07

## 2018-04-11 ENCOUNTER — APPOINTMENT (OUTPATIENT)
Dept: GENERAL RADIOLOGY | Facility: HOSPITAL | Age: 43
End: 2018-04-11

## 2018-04-11 ENCOUNTER — HOSPITAL ENCOUNTER (INPATIENT)
Facility: HOSPITAL | Age: 43
LOS: 21 days | Discharge: NURSING, REHAB OR LONG-TERM CARE | End: 2018-05-02
Attending: EMERGENCY MEDICINE | Admitting: INTERNAL MEDICINE

## 2018-04-11 ENCOUNTER — APPOINTMENT (OUTPATIENT)
Dept: CT IMAGING | Facility: HOSPITAL | Age: 43
End: 2018-04-11

## 2018-04-11 DIAGNOSIS — N39.0 URINARY TRACT INFECTION WITHOUT HEMATURIA, SITE UNSPECIFIED: ICD-10-CM

## 2018-04-11 DIAGNOSIS — N40.1 BENIGN PROSTATIC HYPERPLASIA (BPH) WITH STRAINING ON URINATION: ICD-10-CM

## 2018-04-11 DIAGNOSIS — J96.01 ACUTE RESPIRATORY FAILURE WITH HYPOXIA (HCC): Primary | ICD-10-CM

## 2018-04-11 DIAGNOSIS — R39.16 BENIGN PROSTATIC HYPERPLASIA (BPH) WITH STRAINING ON URINATION: ICD-10-CM

## 2018-04-11 DIAGNOSIS — G80.9 CEREBRAL PALSY, UNSPECIFIED TYPE (HCC): ICD-10-CM

## 2018-04-11 LAB
A-A DO2: 138.6 MMHG (ref 0–300)
A-A DO2: 175 MMHG (ref 0–300)
A-A DO2: 184.3 MMHG (ref 0–300)
ALBUMIN SERPL-MCNC: 3.2 G/DL (ref 3.5–5)
ALBUMIN/GLOB SERPL: 1.4 G/DL (ref 1.5–2.5)
ALP SERPL-CCNC: 71 U/L (ref 40–129)
ALT SERPL W P-5'-P-CCNC: 47 U/L (ref 10–44)
ANION GAP SERPL CALCULATED.3IONS-SCNC: 5.7 MMOL/L (ref 3.6–11.2)
ANION GAP SERPL CALCULATED.3IONS-SCNC: 9.9 MMOL/L (ref 3.6–11.2)
ARTERIAL PATENCY WRIST A: POSITIVE
AST SERPL-CCNC: 25 U/L (ref 10–34)
ATMOSPHERIC PRESS: 733 MMHG
BACTERIA UR QL AUTO: ABNORMAL /HPF
BASE EXCESS BLDA CALC-SCNC: -0.2 MMOL/L
BASE EXCESS BLDA CALC-SCNC: 2.2 MMOL/L
BASE EXCESS BLDA CALC-SCNC: 4.4 MMOL/L
BASOPHILS # BLD AUTO: 0.03 10*3/MM3 (ref 0–0.3)
BASOPHILS # BLD AUTO: 0.03 10*3/MM3 (ref 0–0.3)
BASOPHILS NFR BLD AUTO: 0.2 % (ref 0–2)
BASOPHILS NFR BLD AUTO: 0.2 % (ref 0–2)
BDY SITE: ABNORMAL
BILIRUB SERPL-MCNC: 0.2 MG/DL (ref 0.2–1.8)
BILIRUB UR QL STRIP: NEGATIVE
BNP SERPL-MCNC: 36 PG/ML (ref 0–100)
BODY TEMPERATURE: 98.6 C
BUN BLD-MCNC: 11 MG/DL (ref 7–21)
BUN BLD-MCNC: 11 MG/DL (ref 7–21)
BUN/CREAT SERPL: 26.8 (ref 7–25)
BUN/CREAT SERPL: 28.9 (ref 7–25)
CALCIUM SPEC-SCNC: 8.4 MG/DL (ref 7.7–10)
CALCIUM SPEC-SCNC: 8.4 MG/DL (ref 7.7–10)
CHLORIDE SERPL-SCNC: 100 MMOL/L (ref 99–112)
CHLORIDE SERPL-SCNC: 98 MMOL/L (ref 99–112)
CLARITY UR: ABNORMAL
CO2 SERPL-SCNC: 23.1 MMOL/L (ref 24.3–31.9)
CO2 SERPL-SCNC: 26.3 MMOL/L (ref 24.3–31.9)
COHGB MFR BLD: 0.9 % (ref 0–5)
COHGB MFR BLD: 1.1 % (ref 0–5)
COHGB MFR BLD: 1.1 % (ref 0–5)
COLOR UR: ABNORMAL
CREAT BLD-MCNC: 0.38 MG/DL (ref 0.43–1.29)
CREAT BLD-MCNC: 0.41 MG/DL (ref 0.43–1.29)
D DIMER PPP FEU-MCNC: 0.89 MCGFEU/ML (ref 0–0.5)
D-LACTATE SERPL-SCNC: 1.7 MMOL/L (ref 0.5–2)
DEPRECATED RDW RBC AUTO: 50.4 FL (ref 37–54)
DEPRECATED RDW RBC AUTO: 51.1 FL (ref 37–54)
EOSINOPHIL # BLD AUTO: 0.07 10*3/MM3 (ref 0–0.7)
EOSINOPHIL # BLD AUTO: 0.1 10*3/MM3 (ref 0–0.7)
EOSINOPHIL NFR BLD AUTO: 0.4 % (ref 0–5)
EOSINOPHIL NFR BLD AUTO: 0.8 % (ref 0–5)
ERYTHROCYTE [DISTWIDTH] IN BLOOD BY AUTOMATED COUNT: 17.2 % (ref 11.5–14.5)
ERYTHROCYTE [DISTWIDTH] IN BLOOD BY AUTOMATED COUNT: 17.2 % (ref 11.5–14.5)
FLUAV AG NPH QL: NEGATIVE
FLUBV AG NPH QL IA: NEGATIVE
GFR SERPL CREATININE-BSD FRML MDRD: >150 ML/MIN/1.73
GFR SERPL CREATININE-BSD FRML MDRD: >150 ML/MIN/1.73
GLOBULIN UR ELPH-MCNC: 2.3 GM/DL
GLUCOSE BLD-MCNC: 172 MG/DL (ref 70–110)
GLUCOSE BLD-MCNC: 206 MG/DL (ref 70–110)
GLUCOSE BLDC GLUCOMTR-MCNC: 192 MG/DL (ref 70–130)
GLUCOSE UR STRIP-MCNC: ABNORMAL MG/DL
HCO3 BLDA-SCNC: 23.5 MMOL/L (ref 22–26)
HCO3 BLDA-SCNC: 28.6 MMOL/L (ref 22–26)
HCO3 BLDA-SCNC: 30.4 MMOL/L (ref 22–26)
HCT VFR BLD AUTO: 37.4 % (ref 42–52)
HCT VFR BLD AUTO: 38 % (ref 42–52)
HCT VFR BLD CALC: 36 % (ref 42–52)
HCT VFR BLD CALC: 37 % (ref 42–52)
HCT VFR BLD CALC: 39 % (ref 42–52)
HGB BLD-MCNC: 11.8 G/DL (ref 14–18)
HGB BLD-MCNC: 12 G/DL (ref 14–18)
HGB BLDA-MCNC: 12.3 G/DL (ref 12–16)
HGB BLDA-MCNC: 12.6 G/DL (ref 12–16)
HGB BLDA-MCNC: 13.2 G/DL (ref 12–16)
HGB UR QL STRIP.AUTO: ABNORMAL
HOROWITZ INDEX BLD+IHG-RTO: 100 %
HOROWITZ INDEX BLD+IHG-RTO: 45 %
HOROWITZ INDEX BLD+IHG-RTO: 50 %
HYALINE CASTS UR QL AUTO: ABNORMAL /LPF
IMM GRANULOCYTES # BLD: 0.05 10*3/MM3 (ref 0–0.03)
IMM GRANULOCYTES # BLD: 0.06 10*3/MM3 (ref 0–0.03)
IMM GRANULOCYTES NFR BLD: 0.3 % (ref 0–0.5)
IMM GRANULOCYTES NFR BLD: 0.5 % (ref 0–0.5)
KETONES UR QL STRIP: NEGATIVE
LEUKOCYTE ESTERASE UR QL STRIP.AUTO: ABNORMAL
LYMPHOCYTES # BLD AUTO: 0.65 10*3/MM3 (ref 1–3)
LYMPHOCYTES # BLD AUTO: 0.88 10*3/MM3 (ref 1–3)
LYMPHOCYTES NFR BLD AUTO: 3.8 % (ref 21–51)
LYMPHOCYTES NFR BLD AUTO: 7.3 % (ref 21–51)
MCH RBC QN AUTO: 25.7 PG (ref 27–33)
MCH RBC QN AUTO: 25.8 PG (ref 27–33)
MCHC RBC AUTO-ENTMCNC: 31.6 G/DL (ref 33–37)
MCHC RBC AUTO-ENTMCNC: 31.6 G/DL (ref 33–37)
MCV RBC AUTO: 81.4 FL (ref 80–94)
MCV RBC AUTO: 81.8 FL (ref 80–94)
METHGB BLD QL: 0.2 % (ref 0–3)
METHGB BLD QL: 0.3 % (ref 0–3)
METHGB BLD QL: 0.4 % (ref 0–3)
MODALITY: ABNORMAL
MONOCYTES # BLD AUTO: 0.97 10*3/MM3 (ref 0.1–0.9)
MONOCYTES # BLD AUTO: 1.11 10*3/MM3 (ref 0.1–0.9)
MONOCYTES NFR BLD AUTO: 6.4 % (ref 0–10)
MONOCYTES NFR BLD AUTO: 8 % (ref 0–10)
NEUTROPHILS # BLD AUTO: 10.04 10*3/MM3 (ref 1.4–6.5)
NEUTROPHILS # BLD AUTO: 15.35 10*3/MM3 (ref 1.4–6.5)
NEUTROPHILS NFR BLD AUTO: 83.2 % (ref 30–70)
NEUTROPHILS NFR BLD AUTO: 88.9 % (ref 30–70)
NITRITE UR QL STRIP: POSITIVE
OSMOLALITY SERPL CALC.SUM OF ELEC: 266.1 MOSM/KG (ref 273–305)
OSMOLALITY SERPL CALC.SUM OF ELEC: 269.9 MOSM/KG (ref 273–305)
OXYHGB MFR BLDV: 95.3 % (ref 85–100)
OXYHGB MFR BLDV: 97.1 % (ref 85–100)
OXYHGB MFR BLDV: 98.5 % (ref 85–100)
PCO2 BLDA: 35.6 MM HG (ref 35–45)
PCO2 BLDA: 50.9 MM HG (ref 35–45)
PCO2 BLDA: 52.7 MM HG (ref 35–45)
PEEP RESPIRATORY: <3 CM[H2O]
PH BLDA: 7.35 PH UNITS (ref 7.35–7.45)
PH BLDA: 7.39 PH UNITS (ref 7.35–7.45)
PH BLDA: 7.44 PH UNITS (ref 7.35–7.45)
PH UR STRIP.AUTO: 7 [PH] (ref 5–8)
PLATELET # BLD AUTO: 186 10*3/MM3 (ref 130–400)
PLATELET # BLD AUTO: 79 10*3/MM3 (ref 130–400)
PMV BLD AUTO: 10.2 FL (ref 6–10)
PMV BLD AUTO: 11.1 FL (ref 6–10)
PO2 BLDA: 110.7 MM HG (ref 80–100)
PO2 BLDA: 494.7 MM HG (ref 80–100)
PO2 BLDA: 83.9 MM HG (ref 80–100)
POTASSIUM BLD-SCNC: 3.8 MMOL/L (ref 3.5–5.3)
POTASSIUM BLD-SCNC: 3.8 MMOL/L (ref 3.5–5.3)
PROT SERPL-MCNC: 5.5 G/DL (ref 6–8)
PROT UR QL STRIP: ABNORMAL
RBC # BLD AUTO: 4.57 10*6/MM3 (ref 4.7–6.1)
RBC # BLD AUTO: 4.67 10*6/MM3 (ref 4.7–6.1)
RBC # UR: ABNORMAL /HPF
REF LAB TEST METHOD: ABNORMAL
SAO2 % BLDCOA: 100 % (ref 90–100)
SAO2 % BLDCOA: 96.7 % (ref 90–100)
SAO2 % BLDCOA: 98.2 % (ref 90–100)
SET MECH RESP RATE: 16
SET MECH RESP RATE: 18
SET MECH RESP RATE: 18
SODIUM BLD-SCNC: 131 MMOL/L (ref 135–153)
SODIUM BLD-SCNC: 132 MMOL/L (ref 135–153)
SP GR UR STRIP: 1.02 (ref 1–1.03)
SQUAMOUS #/AREA URNS HPF: ABNORMAL /HPF
TROPONIN I SERPL-MCNC: 0.02 NG/ML
TROPONIN I SERPL-MCNC: 0.02 NG/ML
UROBILINOGEN UR QL STRIP: ABNORMAL
VENTILATOR MODE: ABNORMAL
VENTILATOR MODE: ABNORMAL
VENTILATOR MODE: AC
VT ON VENT VENT: 550 ML
WBC NRBC COR # BLD: 12.08 10*3/MM3 (ref 4.5–12.5)
WBC NRBC COR # BLD: 17.26 10*3/MM3 (ref 4.5–12.5)
WBC UR QL AUTO: ABNORMAL /HPF

## 2018-04-11 PROCEDURE — 94799 UNLISTED PULMONARY SVC/PX: CPT

## 2018-04-11 PROCEDURE — 25010000002 CEFTRIAXONE: Performed by: EMERGENCY MEDICINE

## 2018-04-11 PROCEDURE — 71275 CT ANGIOGRAPHY CHEST: CPT

## 2018-04-11 PROCEDURE — 36415 COLL VENOUS BLD VENIPUNCTURE: CPT

## 2018-04-11 PROCEDURE — 63710000001 INSULIN ASPART PER 5 UNITS: Performed by: INTERNAL MEDICINE

## 2018-04-11 PROCEDURE — 85025 COMPLETE CBC W/AUTO DIFF WBC: CPT | Performed by: INTERNAL MEDICINE

## 2018-04-11 PROCEDURE — 99285 EMERGENCY DEPT VISIT HI MDM: CPT

## 2018-04-11 PROCEDURE — 31500 INSERT EMERGENCY AIRWAY: CPT

## 2018-04-11 PROCEDURE — 51702 INSERT TEMP BLADDER CATH: CPT

## 2018-04-11 PROCEDURE — 84484 ASSAY OF TROPONIN QUANT: CPT | Performed by: EMERGENCY MEDICINE

## 2018-04-11 PROCEDURE — 25010000002 MIDAZOLAM PER 1 MG: Performed by: EMERGENCY MEDICINE

## 2018-04-11 PROCEDURE — 87040 BLOOD CULTURE FOR BACTERIA: CPT | Performed by: EMERGENCY MEDICINE

## 2018-04-11 PROCEDURE — 87804 INFLUENZA ASSAY W/OPTIC: CPT | Performed by: EMERGENCY MEDICINE

## 2018-04-11 PROCEDURE — 71045 X-RAY EXAM CHEST 1 VIEW: CPT | Performed by: RADIOLOGY

## 2018-04-11 PROCEDURE — 85379 FIBRIN DEGRADATION QUANT: CPT | Performed by: EMERGENCY MEDICINE

## 2018-04-11 PROCEDURE — 83880 ASSAY OF NATRIURETIC PEPTIDE: CPT | Performed by: EMERGENCY MEDICINE

## 2018-04-11 PROCEDURE — 31500 INSERT EMERGENCY AIRWAY: CPT | Performed by: EMERGENCY MEDICINE

## 2018-04-11 PROCEDURE — 93010 ELECTROCARDIOGRAM REPORT: CPT | Performed by: INTERNAL MEDICINE

## 2018-04-11 PROCEDURE — 81001 URINALYSIS AUTO W/SCOPE: CPT | Performed by: EMERGENCY MEDICINE

## 2018-04-11 PROCEDURE — 82375 ASSAY CARBOXYHB QUANT: CPT | Performed by: INTERNAL MEDICINE

## 2018-04-11 PROCEDURE — 25010000002 VANCOMYCIN PER 500 MG: Performed by: INTERNAL MEDICINE

## 2018-04-11 PROCEDURE — 83605 ASSAY OF LACTIC ACID: CPT | Performed by: EMERGENCY MEDICINE

## 2018-04-11 PROCEDURE — 25010000002 SUCCINYLCHOLINE PER 20 MG: Performed by: EMERGENCY MEDICINE

## 2018-04-11 PROCEDURE — 25010000002 PROPOFOL 1000 MG/ML EMULSION: Performed by: INTERNAL MEDICINE

## 2018-04-11 PROCEDURE — 85025 COMPLETE CBC W/AUTO DIFF WBC: CPT | Performed by: EMERGENCY MEDICINE

## 2018-04-11 PROCEDURE — 71275 CT ANGIOGRAPHY CHEST: CPT | Performed by: RADIOLOGY

## 2018-04-11 PROCEDURE — 93005 ELECTROCARDIOGRAM TRACING: CPT | Performed by: EMERGENCY MEDICINE

## 2018-04-11 PROCEDURE — 84484 ASSAY OF TROPONIN QUANT: CPT | Performed by: INTERNAL MEDICINE

## 2018-04-11 PROCEDURE — 82805 BLOOD GASES W/O2 SATURATION: CPT | Performed by: INTERNAL MEDICINE

## 2018-04-11 PROCEDURE — 82962 GLUCOSE BLOOD TEST: CPT

## 2018-04-11 PROCEDURE — 25010000002 HEPARIN (PORCINE) PER 1000 UNITS: Performed by: INTERNAL MEDICINE

## 2018-04-11 PROCEDURE — 71045 X-RAY EXAM CHEST 1 VIEW: CPT

## 2018-04-11 PROCEDURE — 82375 ASSAY CARBOXYHB QUANT: CPT | Performed by: EMERGENCY MEDICINE

## 2018-04-11 PROCEDURE — 83050 HGB METHEMOGLOBIN QUAN: CPT | Performed by: EMERGENCY MEDICINE

## 2018-04-11 PROCEDURE — 25010000002 AZITHROMYCIN: Performed by: EMERGENCY MEDICINE

## 2018-04-11 PROCEDURE — 36600 WITHDRAWAL OF ARTERIAL BLOOD: CPT | Performed by: EMERGENCY MEDICINE

## 2018-04-11 PROCEDURE — 87086 URINE CULTURE/COLONY COUNT: CPT | Performed by: EMERGENCY MEDICINE

## 2018-04-11 PROCEDURE — 0 IOPAMIDOL PER 1 ML: Performed by: EMERGENCY MEDICINE

## 2018-04-11 PROCEDURE — 25010000002 MIDAZOLAM PER 1 MG

## 2018-04-11 PROCEDURE — 99223 1ST HOSP IP/OBS HIGH 75: CPT | Performed by: INTERNAL MEDICINE

## 2018-04-11 PROCEDURE — 80053 COMPREHEN METABOLIC PANEL: CPT | Performed by: EMERGENCY MEDICINE

## 2018-04-11 PROCEDURE — 94002 VENT MGMT INPAT INIT DAY: CPT

## 2018-04-11 PROCEDURE — 36600 WITHDRAWAL OF ARTERIAL BLOOD: CPT | Performed by: INTERNAL MEDICINE

## 2018-04-11 PROCEDURE — 83050 HGB METHEMOGLOBIN QUAN: CPT | Performed by: INTERNAL MEDICINE

## 2018-04-11 PROCEDURE — 25010000002 SUCCINYLCHOLINE PER 20 MG

## 2018-04-11 PROCEDURE — 82805 BLOOD GASES W/O2 SATURATION: CPT | Performed by: EMERGENCY MEDICINE

## 2018-04-11 RX ORDER — DANTROLENE SODIUM 25 MG/1
25 CAPSULE ORAL 2 TIMES DAILY
Status: DISCONTINUED | OUTPATIENT
Start: 2018-04-11 | End: 2018-05-02 | Stop reason: HOSPADM

## 2018-04-11 RX ORDER — ATORVASTATIN CALCIUM 10 MG/1
10 TABLET, FILM COATED ORAL NIGHTLY
Status: CANCELLED | OUTPATIENT
Start: 2018-04-11

## 2018-04-11 RX ORDER — LEVOTHYROXINE SODIUM 0.07 MG/1
75 TABLET ORAL EVERY MORNING
Status: CANCELLED | OUTPATIENT
Start: 2018-04-12

## 2018-04-11 RX ORDER — CITALOPRAM 20 MG/1
20 TABLET ORAL NIGHTLY
Status: DISCONTINUED | OUTPATIENT
Start: 2018-04-11 | End: 2018-04-17

## 2018-04-11 RX ORDER — NITROFURANTOIN 25; 75 MG/1; MG/1
100 CAPSULE ORAL 2 TIMES DAILY
Status: CANCELLED | OUTPATIENT
Start: 2018-04-11 | End: 2018-04-17

## 2018-04-11 RX ORDER — ATORVASTATIN CALCIUM 10 MG/1
10 TABLET, FILM COATED ORAL NIGHTLY
Status: DISCONTINUED | OUTPATIENT
Start: 2018-04-11 | End: 2018-05-02 | Stop reason: HOSPADM

## 2018-04-11 RX ORDER — OXCARBAZEPINE 300 MG/1
900 TABLET, FILM COATED ORAL 2 TIMES DAILY
Status: CANCELLED | OUTPATIENT
Start: 2018-04-11

## 2018-04-11 RX ORDER — MIDAZOLAM HYDROCHLORIDE 1 MG/ML
2 INJECTION INTRAMUSCULAR; INTRAVENOUS ONCE
Status: COMPLETED | OUTPATIENT
Start: 2018-04-11 | End: 2018-04-11

## 2018-04-11 RX ORDER — PANTOPRAZOLE SODIUM 40 MG/1
40 TABLET, DELAYED RELEASE ORAL EVERY MORNING
Status: CANCELLED | OUTPATIENT
Start: 2018-04-12

## 2018-04-11 RX ORDER — HEPARIN SODIUM 5000 [USP'U]/ML
5000 INJECTION, SOLUTION INTRAVENOUS; SUBCUTANEOUS EVERY 12 HOURS SCHEDULED
Status: DISCONTINUED | OUTPATIENT
Start: 2018-04-11 | End: 2018-05-02 | Stop reason: HOSPADM

## 2018-04-11 RX ORDER — DEXTROSE MONOHYDRATE 25 G/50ML
25 INJECTION, SOLUTION INTRAVENOUS
Status: DISCONTINUED | OUTPATIENT
Start: 2018-04-11 | End: 2018-05-02 | Stop reason: HOSPADM

## 2018-04-11 RX ORDER — CHOLECALCIFEROL (VITAMIN D3) 125 MCG
10 CAPSULE ORAL NIGHTLY
COMMUNITY
End: 2018-05-02 | Stop reason: HOSPADM

## 2018-04-11 RX ORDER — SODIUM CHLORIDE 9 MG/ML
125 INJECTION, SOLUTION INTRAVENOUS CONTINUOUS
Status: DISCONTINUED | OUTPATIENT
Start: 2018-04-11 | End: 2018-04-16

## 2018-04-11 RX ORDER — ONDANSETRON 2 MG/ML
4 INJECTION INTRAMUSCULAR; INTRAVENOUS EVERY 6 HOURS PRN
Status: DISCONTINUED | OUTPATIENT
Start: 2018-04-11 | End: 2018-05-02 | Stop reason: HOSPADM

## 2018-04-11 RX ORDER — ACETAMINOPHEN 650 MG/1
650 SUPPOSITORY RECTAL EVERY 4 HOURS PRN
Status: DISCONTINUED | OUTPATIENT
Start: 2018-04-11 | End: 2018-04-14

## 2018-04-11 RX ORDER — SODIUM CHLORIDE 0.9 % (FLUSH) 0.9 %
10 SYRINGE (ML) INJECTION AS NEEDED
Status: DISCONTINUED | OUTPATIENT
Start: 2018-04-11 | End: 2018-05-02 | Stop reason: HOSPADM

## 2018-04-11 RX ORDER — SUCCINYLCHOLINE CHLORIDE 20 MG/ML
100 INJECTION INTRAMUSCULAR; INTRAVENOUS ONCE
Status: COMPLETED | OUTPATIENT
Start: 2018-04-11 | End: 2018-04-11

## 2018-04-11 RX ORDER — NICOTINE POLACRILEX 4 MG
15 LOZENGE BUCCAL
Status: DISCONTINUED | OUTPATIENT
Start: 2018-04-11 | End: 2018-04-18

## 2018-04-11 RX ORDER — SODIUM CHLORIDE 0.9 % (FLUSH) 0.9 %
10 SYRINGE (ML) INJECTION EVERY 12 HOURS SCHEDULED
Status: DISCONTINUED | OUTPATIENT
Start: 2018-04-12 | End: 2018-05-02 | Stop reason: HOSPADM

## 2018-04-11 RX ORDER — BACLOFEN 10 MG/1
20 TABLET ORAL
Status: CANCELLED | OUTPATIENT
Start: 2018-04-11

## 2018-04-11 RX ORDER — LEVETIRACETAM 750 MG/1
750 TABLET ORAL 2 TIMES DAILY
COMMUNITY
End: 2018-05-02 | Stop reason: HOSPADM

## 2018-04-11 RX ORDER — ACETAMINOPHEN 650 MG/1
650 SUPPOSITORY RECTAL EVERY 4 HOURS PRN
Status: DISCONTINUED | OUTPATIENT
Start: 2018-04-11 | End: 2018-04-11 | Stop reason: SDUPTHER

## 2018-04-11 RX ORDER — OXCARBAZEPINE 300 MG/1
900 TABLET, FILM COATED ORAL 2 TIMES DAILY
Status: DISCONTINUED | OUTPATIENT
Start: 2018-04-11 | End: 2018-05-02 | Stop reason: HOSPADM

## 2018-04-11 RX ORDER — MIDAZOLAM HYDROCHLORIDE 1 MG/ML
INJECTION INTRAMUSCULAR; INTRAVENOUS
Status: COMPLETED
Start: 2018-04-11 | End: 2018-04-11

## 2018-04-11 RX ORDER — ACETAMINOPHEN 325 MG/1
650 TABLET ORAL EVERY 4 HOURS PRN
Status: DISCONTINUED | OUTPATIENT
Start: 2018-04-11 | End: 2018-04-14

## 2018-04-11 RX ORDER — NITROFURANTOIN 25; 75 MG/1; MG/1
100 CAPSULE ORAL 2 TIMES DAILY
COMMUNITY
Start: 2018-04-09 | End: 2018-05-02 | Stop reason: HOSPADM

## 2018-04-11 RX ORDER — PANTOPRAZOLE SODIUM 40 MG/10ML
40 INJECTION, POWDER, LYOPHILIZED, FOR SOLUTION INTRAVENOUS
Status: DISCONTINUED | OUTPATIENT
Start: 2018-04-11 | End: 2018-05-02 | Stop reason: HOSPADM

## 2018-04-11 RX ORDER — PANTOPRAZOLE SODIUM 40 MG/10ML
40 INJECTION, POWDER, LYOPHILIZED, FOR SOLUTION INTRAVENOUS
Status: DISCONTINUED | OUTPATIENT
Start: 2018-04-12 | End: 2018-04-12 | Stop reason: SDUPTHER

## 2018-04-11 RX ORDER — CHOLECALCIFEROL (VITAMIN D3) 125 MCG
10 CAPSULE ORAL NIGHTLY
Status: CANCELLED | OUTPATIENT
Start: 2018-04-11

## 2018-04-11 RX ORDER — BACLOFEN 10 MG/1
20 TABLET ORAL
Status: DISCONTINUED | OUTPATIENT
Start: 2018-04-11 | End: 2018-05-02 | Stop reason: HOSPADM

## 2018-04-11 RX ORDER — DANTROLENE SODIUM 25 MG/1
25 CAPSULE ORAL 2 TIMES DAILY
Status: CANCELLED | OUTPATIENT
Start: 2018-04-11

## 2018-04-11 RX ORDER — CITALOPRAM 20 MG/1
20 TABLET ORAL NIGHTLY
Status: CANCELLED | OUTPATIENT
Start: 2018-04-11

## 2018-04-11 RX ORDER — CHLORHEXIDINE GLUCONATE 0.12 MG/ML
15 RINSE ORAL EVERY 12 HOURS SCHEDULED
Status: DISCONTINUED | OUTPATIENT
Start: 2018-04-12 | End: 2018-05-02 | Stop reason: HOSPADM

## 2018-04-11 RX ORDER — SODIUM CHLORIDE 0.9 % (FLUSH) 0.9 %
1-10 SYRINGE (ML) INJECTION AS NEEDED
Status: DISCONTINUED | OUTPATIENT
Start: 2018-04-11 | End: 2018-05-02 | Stop reason: HOSPADM

## 2018-04-11 RX ORDER — OXCARBAZEPINE 300 MG/1
900 TABLET, FILM COATED ORAL 2 TIMES DAILY
COMMUNITY
End: 2018-05-02 | Stop reason: HOSPADM

## 2018-04-11 RX ORDER — LEVETIRACETAM 250 MG/1
750 TABLET ORAL 2 TIMES DAILY
Status: CANCELLED | OUTPATIENT
Start: 2018-04-11

## 2018-04-11 RX ORDER — IPRATROPIUM BROMIDE AND ALBUTEROL SULFATE 2.5; .5 MG/3ML; MG/3ML
3 SOLUTION RESPIRATORY (INHALATION)
Status: DISCONTINUED | OUTPATIENT
Start: 2018-04-11 | End: 2018-05-02 | Stop reason: HOSPADM

## 2018-04-11 RX ORDER — MEGESTROL ACETATE 40 MG/ML
400 SUSPENSION ORAL 2 TIMES DAILY
Status: CANCELLED | OUTPATIENT
Start: 2018-04-11

## 2018-04-11 RX ORDER — LEVOTHYROXINE SODIUM 0.07 MG/1
75 TABLET ORAL EVERY MORNING
Status: DISCONTINUED | OUTPATIENT
Start: 2018-04-12 | End: 2018-05-02 | Stop reason: HOSPADM

## 2018-04-11 RX ORDER — SODIUM CHLORIDE 9 MG/ML
INJECTION, SOLUTION INTRAVENOUS
Status: COMPLETED
Start: 2018-04-11 | End: 2018-04-11

## 2018-04-11 RX ADMIN — SODIUM CHLORIDE 125 ML/HR: 9 INJECTION, SOLUTION INTRAVENOUS at 13:56

## 2018-04-11 RX ADMIN — OXCARBAZEPINE 900 MG: 300 TABLET, FILM COATED ORAL at 21:25

## 2018-04-11 RX ADMIN — LEVETIRACETAM 750 MG: 250 TABLET, FILM COATED ORAL at 21:25

## 2018-04-11 RX ADMIN — ATORVASTATIN CALCIUM 10 MG: 10 TABLET, FILM COATED ORAL at 21:25

## 2018-04-11 RX ADMIN — MIDAZOLAM 2 MG: 1 INJECTION INTRAMUSCULAR; INTRAVENOUS at 12:50

## 2018-04-11 RX ADMIN — HEPARIN SODIUM 5000 UNITS: 5000 INJECTION, SOLUTION INTRAVENOUS; SUBCUTANEOUS at 20:10

## 2018-04-11 RX ADMIN — SODIUM CHLORIDE 1000 ML: 9 INJECTION, SOLUTION INTRAVENOUS at 12:50

## 2018-04-11 RX ADMIN — AZITHROMYCIN MONOHYDRATE 500 MG: 500 INJECTION, POWDER, LYOPHILIZED, FOR SOLUTION INTRAVENOUS at 14:47

## 2018-04-11 RX ADMIN — CITALOPRAM HYDROBROMIDE 20 MG: 20 TABLET ORAL at 21:25

## 2018-04-11 RX ADMIN — INSULIN ASPART 2 UNITS: 100 INJECTION, SOLUTION INTRAVENOUS; SUBCUTANEOUS at 20:13

## 2018-04-11 RX ADMIN — IOPAMIDOL 100 ML: 755 INJECTION, SOLUTION INTRAVENOUS at 16:28

## 2018-04-11 RX ADMIN — PROPOFOL 5 MCG/KG/MIN: 10 INJECTION, EMULSION INTRAVENOUS at 17:08

## 2018-04-11 RX ADMIN — SUCCINYLCHOLINE CHLORIDE 100 MG: 20 INJECTION, SOLUTION INTRAMUSCULAR; INTRAVENOUS at 12:39

## 2018-04-11 RX ADMIN — DANTROLENE SODIUM 25 MG: 25 CAPSULE ORAL at 21:25

## 2018-04-11 RX ADMIN — ACETAMINOPHEN 650 MG: 325 TABLET ORAL at 19:58

## 2018-04-11 RX ADMIN — BACLOFEN 20 MG: 10 TABLET ORAL at 21:25

## 2018-04-11 RX ADMIN — MIDAZOLAM HYDROCHLORIDE 2 MG: 1 INJECTION INTRAMUSCULAR; INTRAVENOUS at 12:50

## 2018-04-11 RX ADMIN — AZTREONAM 2 G: 2 INJECTION, POWDER, FOR SOLUTION INTRAMUSCULAR; INTRAVENOUS at 14:08

## 2018-04-11 RX ADMIN — CEFTRIAXONE 1 G: 1 INJECTION, POWDER, FOR SOLUTION INTRAMUSCULAR; INTRAVENOUS at 13:45

## 2018-04-11 RX ADMIN — MIDAZOLAM 2 MG: 1 INJECTION INTRAMUSCULAR; INTRAVENOUS at 14:45

## 2018-04-11 RX ADMIN — VANCOMYCIN HYDROCHLORIDE 1500 MG: 5 INJECTION, POWDER, LYOPHILIZED, FOR SOLUTION INTRAVENOUS at 19:58

## 2018-04-11 RX ADMIN — IPRATROPIUM BROMIDE AND ALBUTEROL SULFATE 3 ML: .5; 3 SOLUTION RESPIRATORY (INHALATION) at 18:33

## 2018-04-12 ENCOUNTER — APPOINTMENT (OUTPATIENT)
Dept: GENERAL RADIOLOGY | Facility: HOSPITAL | Age: 43
End: 2018-04-12

## 2018-04-12 LAB
A-A DO2: 200.4 MMHG (ref 0–300)
A-A DO2: 241.6 MMHG (ref 0–300)
ANION GAP SERPL CALCULATED.3IONS-SCNC: 8.7 MMOL/L (ref 3.6–11.2)
ARTERIAL PATENCY WRIST A: ABNORMAL
ARTERIAL PATENCY WRIST A: POSITIVE
ATMOSPHERIC PRESS: 730 MMHG
ATMOSPHERIC PRESS: 733 MMHG
BASE EXCESS BLDA CALC-SCNC: -4.6 MMOL/L
BASE EXCESS BLDA CALC-SCNC: 1.9 MMOL/L
BASOPHILS # BLD AUTO: 0.04 10*3/MM3 (ref 0–0.3)
BASOPHILS NFR BLD AUTO: 0.2 % (ref 0–2)
BDY SITE: ABNORMAL
BDY SITE: ABNORMAL
BODY TEMPERATURE: 98.6 C
BODY TEMPERATURE: 98.9 C
BUN BLD-MCNC: 7 MG/DL (ref 7–21)
BUN/CREAT SERPL: 17.9 (ref 7–25)
CALCIUM SPEC-SCNC: 8.8 MG/DL (ref 7.7–10)
CHLORIDE SERPL-SCNC: 100 MMOL/L (ref 99–112)
CO2 SERPL-SCNC: 24.3 MMOL/L (ref 24.3–31.9)
COHGB MFR BLD: 0.8 % (ref 0–5)
COHGB MFR BLD: 0.8 % (ref 0–5)
CREAT BLD-MCNC: 0.39 MG/DL (ref 0.43–1.29)
DEPRECATED RDW RBC AUTO: 50.4 FL (ref 37–54)
EOSINOPHIL # BLD AUTO: 0.05 10*3/MM3 (ref 0–0.7)
EOSINOPHIL NFR BLD AUTO: 0.2 % (ref 0–5)
ERYTHROCYTE [DISTWIDTH] IN BLOOD BY AUTOMATED COUNT: 17.4 % (ref 11.5–14.5)
GFR SERPL CREATININE-BSD FRML MDRD: >150 ML/MIN/1.73
GLUCOSE BLD-MCNC: 153 MG/DL (ref 70–110)
GLUCOSE BLDC GLUCOMTR-MCNC: 131 MG/DL (ref 70–130)
GLUCOSE BLDC GLUCOMTR-MCNC: 137 MG/DL (ref 70–130)
GLUCOSE BLDC GLUCOMTR-MCNC: 153 MG/DL (ref 70–130)
GLUCOSE BLDC GLUCOMTR-MCNC: 172 MG/DL (ref 70–130)
HCO3 BLDA-SCNC: 22.1 MMOL/L (ref 22–26)
HCO3 BLDA-SCNC: 22.7 MMOL/L (ref 22–26)
HCT VFR BLD AUTO: 37 % (ref 42–52)
HCT VFR BLD CALC: 37 % (ref 42–52)
HCT VFR BLD CALC: 39 % (ref 42–52)
HGB BLD-MCNC: 12.1 G/DL (ref 14–18)
HGB BLDA-MCNC: 12.6 G/DL (ref 12–16)
HGB BLDA-MCNC: 13.1 G/DL (ref 12–16)
HOROWITZ INDEX BLD+IHG-RTO: 45 %
HOROWITZ INDEX BLD+IHG-RTO: 60 %
IMM GRANULOCYTES # BLD: 0.1 10*3/MM3 (ref 0–0.03)
IMM GRANULOCYTES NFR BLD: 0.5 % (ref 0–0.5)
KOH PREP NAIL: NORMAL
LYMPHOCYTES # BLD AUTO: 1.18 10*3/MM3 (ref 1–3)
LYMPHOCYTES NFR BLD AUTO: 5.6 % (ref 21–51)
MAGNESIUM SERPL-MCNC: 1.9 MG/DL (ref 1.7–2.6)
MCH RBC QN AUTO: 26.1 PG (ref 27–33)
MCHC RBC AUTO-ENTMCNC: 32.7 G/DL (ref 33–37)
MCV RBC AUTO: 79.9 FL (ref 80–94)
METHGB BLD QL: 0.2 % (ref 0–3)
METHGB BLD QL: 0.3 % (ref 0–3)
MODALITY: ABNORMAL
MODALITY: ABNORMAL
MONOCYTES # BLD AUTO: 1.92 10*3/MM3 (ref 0.1–0.9)
MONOCYTES NFR BLD AUTO: 9.1 % (ref 0–10)
NEUTROPHILS # BLD AUTO: 17.7 10*3/MM3 (ref 1.4–6.5)
NEUTROPHILS NFR BLD AUTO: 84.4 % (ref 30–70)
OSMOLALITY SERPL CALC.SUM OF ELEC: 267.4 MOSM/KG (ref 273–305)
OXYHGB MFR BLDV: 96 % (ref 85–100)
OXYHGB MFR BLDV: 96.3 % (ref 85–100)
PCO2 BLDA: 23.4 MM HG (ref 35–45)
PCO2 BLDA: 51.1 MM HG (ref 35–45)
PEEP RESPIRATORY: 5 CM[H2O]
PH BLDA: 7.26 PH UNITS (ref 7.35–7.45)
PH BLDA: 7.59 PH UNITS (ref 7.35–7.45)
PHOSPHATE SERPL-MCNC: 3 MG/DL (ref 2.7–4.5)
PLATELET # BLD AUTO: 186 10*3/MM3 (ref 130–400)
PMV BLD AUTO: 10.9 FL (ref 6–10)
PO2 BLDA: 109.9 MM HG (ref 80–100)
PO2 BLDA: 81.7 MM HG (ref 80–100)
POTASSIUM BLD-SCNC: 3.7 MMOL/L (ref 3.5–5.3)
RBC # BLD AUTO: 4.63 10*6/MM3 (ref 4.7–6.1)
SAO2 % BLDCOA: 97 % (ref 90–100)
SAO2 % BLDCOA: 97.4 % (ref 90–100)
SET MECH RESP RATE: 15
SET MECH RESP RATE: 18
SODIUM BLD-SCNC: 133 MMOL/L (ref 135–153)
TOTAL RATE: 45 BREATHS/MINUTE
TROPONIN I SERPL-MCNC: 0.01 NG/ML
VENTILATOR MODE: AC
VENTILATOR MODE: AC
VT ON VENT VENT: 450 ML
VT ON VENT VENT: 550 ML
WBC NRBC COR # BLD: 20.99 10*3/MM3 (ref 4.5–12.5)

## 2018-04-12 PROCEDURE — 82962 GLUCOSE BLOOD TEST: CPT

## 2018-04-12 PROCEDURE — 0B9F8ZX DRAINAGE OF RIGHT LOWER LUNG LOBE, VIA NATURAL OR ARTIFICIAL OPENING ENDOSCOPIC, DIAGNOSTIC: ICD-10-PCS | Performed by: INTERNAL MEDICINE

## 2018-04-12 PROCEDURE — 85025 COMPLETE CBC W/AUTO DIFF WBC: CPT | Performed by: INTERNAL MEDICINE

## 2018-04-12 PROCEDURE — 71045 X-RAY EXAM CHEST 1 VIEW: CPT

## 2018-04-12 PROCEDURE — 84100 ASSAY OF PHOSPHORUS: CPT | Performed by: INTERNAL MEDICINE

## 2018-04-12 PROCEDURE — 82805 BLOOD GASES W/O2 SATURATION: CPT | Performed by: INTERNAL MEDICINE

## 2018-04-12 PROCEDURE — 94799 UNLISTED PULMONARY SVC/PX: CPT

## 2018-04-12 PROCEDURE — 71045 X-RAY EXAM CHEST 1 VIEW: CPT | Performed by: RADIOLOGY

## 2018-04-12 PROCEDURE — 99232 SBSQ HOSP IP/OBS MODERATE 35: CPT | Performed by: INTERNAL MEDICINE

## 2018-04-12 PROCEDURE — 99292 CRITICAL CARE ADDL 30 MIN: CPT | Performed by: INTERNAL MEDICINE

## 2018-04-12 PROCEDURE — 25010000002 PROPOFOL 1000 MG/ML EMULSION: Performed by: INTERNAL MEDICINE

## 2018-04-12 PROCEDURE — 87070 CULTURE OTHR SPECIMN AEROBIC: CPT | Performed by: INTERNAL MEDICINE

## 2018-04-12 PROCEDURE — 25010000002 EPINEPHRINE PF 1 MG/10ML SOLUTION PREFILLED SYRINGE

## 2018-04-12 PROCEDURE — 99291 CRITICAL CARE FIRST HOUR: CPT | Performed by: INTERNAL MEDICINE

## 2018-04-12 PROCEDURE — 36600 WITHDRAWAL OF ARTERIAL BLOOD: CPT | Performed by: INTERNAL MEDICINE

## 2018-04-12 PROCEDURE — 94640 AIRWAY INHALATION TREATMENT: CPT

## 2018-04-12 PROCEDURE — C1751 CATH, INF, PER/CENT/MIDLINE: HCPCS

## 2018-04-12 PROCEDURE — 94003 VENT MGMT INPAT SUBQ DAY: CPT

## 2018-04-12 PROCEDURE — 82375 ASSAY CARBOXYHB QUANT: CPT | Performed by: INTERNAL MEDICINE

## 2018-04-12 PROCEDURE — 87147 CULTURE TYPE IMMUNOLOGIC: CPT | Performed by: INTERNAL MEDICINE

## 2018-04-12 PROCEDURE — 31624 DX BRONCHOSCOPE/LAVAGE: CPT | Performed by: INTERNAL MEDICINE

## 2018-04-12 PROCEDURE — 0BH17EZ INSERTION OF ENDOTRACHEAL AIRWAY INTO TRACHEA, VIA NATURAL OR ARTIFICIAL OPENING: ICD-10-PCS | Performed by: INTERNAL MEDICINE

## 2018-04-12 PROCEDURE — 83735 ASSAY OF MAGNESIUM: CPT | Performed by: INTERNAL MEDICINE

## 2018-04-12 PROCEDURE — 87220 TISSUE EXAM FOR FUNGI: CPT | Performed by: INTERNAL MEDICINE

## 2018-04-12 PROCEDURE — 5A1955Z RESPIRATORY VENTILATION, GREATER THAN 96 CONSECUTIVE HOURS: ICD-10-PCS | Performed by: INTERNAL MEDICINE

## 2018-04-12 PROCEDURE — 87205 SMEAR GRAM STAIN: CPT | Performed by: INTERNAL MEDICINE

## 2018-04-12 PROCEDURE — 83050 HGB METHEMOGLOBIN QUAN: CPT | Performed by: INTERNAL MEDICINE

## 2018-04-12 PROCEDURE — 87077 CULTURE AEROBIC IDENTIFY: CPT | Performed by: INTERNAL MEDICINE

## 2018-04-12 PROCEDURE — 84484 ASSAY OF TROPONIN QUANT: CPT | Performed by: INTERNAL MEDICINE

## 2018-04-12 PROCEDURE — 25010000002 HEPARIN (PORCINE) PER 1000 UNITS: Performed by: INTERNAL MEDICINE

## 2018-04-12 PROCEDURE — 80048 BASIC METABOLIC PNL TOTAL CA: CPT | Performed by: INTERNAL MEDICINE

## 2018-04-12 PROCEDURE — 63710000001 INSULIN ASPART PER 5 UNITS: Performed by: INTERNAL MEDICINE

## 2018-04-12 PROCEDURE — 31500 INSERT EMERGENCY AIRWAY: CPT | Performed by: INTERNAL MEDICINE

## 2018-04-12 PROCEDURE — 0B9J8ZX DRAINAGE OF LEFT LOWER LUNG LOBE, VIA NATURAL OR ARTIFICIAL OPENING ENDOSCOPIC, DIAGNOSTIC: ICD-10-PCS | Performed by: INTERNAL MEDICINE

## 2018-04-12 PROCEDURE — 25010000002 VANCOMYCIN PER 500 MG: Performed by: INTERNAL MEDICINE

## 2018-04-12 PROCEDURE — 25010000002 HEPARIN FLUSH (PORCINE) 100 UNIT/ML SOLUTION: Performed by: INTERNAL MEDICINE

## 2018-04-12 PROCEDURE — 87186 SC STD MICRODIL/AGAR DIL: CPT | Performed by: INTERNAL MEDICINE

## 2018-04-12 RX ORDER — MAGNESIUM HYDROXIDE 1200 MG/15ML
LIQUID ORAL
Status: COMPLETED
Start: 2018-04-12 | End: 2018-04-12

## 2018-04-12 RX ORDER — L.ACID,PARA/B.BIFIDUM/S.THERM 8B CELL
1 CAPSULE ORAL DAILY
Status: DISCONTINUED | OUTPATIENT
Start: 2018-04-12 | End: 2018-05-02 | Stop reason: HOSPADM

## 2018-04-12 RX ORDER — CASTOR OIL AND BALSAM, PERU 788; 87 MG/G; MG/G
OINTMENT TOPICAL EVERY 12 HOURS SCHEDULED
Status: DISCONTINUED | OUTPATIENT
Start: 2018-04-12 | End: 2018-05-02 | Stop reason: HOSPADM

## 2018-04-12 RX ORDER — LEVETIRACETAM 100 MG/ML
750 SOLUTION ORAL EVERY 12 HOURS SCHEDULED
Status: DISCONTINUED | OUTPATIENT
Start: 2018-04-12 | End: 2018-05-02 | Stop reason: HOSPADM

## 2018-04-12 RX ORDER — SODIUM HYPOCHLORITE 1.25 MG/ML
SOLUTION TOPICAL 2 TIMES DAILY
Status: DISCONTINUED | OUTPATIENT
Start: 2018-04-12 | End: 2018-05-02 | Stop reason: HOSPADM

## 2018-04-12 RX ORDER — MIDAZOLAM HYDROCHLORIDE 1 MG/ML
INJECTION INTRAMUSCULAR; INTRAVENOUS
Status: DISPENSED
Start: 2018-04-12 | End: 2018-04-12

## 2018-04-12 RX ADMIN — HEPARIN SODIUM 5000 UNITS: 5000 INJECTION, SOLUTION INTRAVENOUS; SUBCUTANEOUS at 08:08

## 2018-04-12 RX ADMIN — SODIUM CHLORIDE, PRESERVATIVE FREE 300 UNITS: 5 INJECTION INTRAVENOUS at 00:13

## 2018-04-12 RX ADMIN — CHLORHEXIDINE GLUCONATE 15 ML: 1.2 RINSE ORAL at 08:06

## 2018-04-12 RX ADMIN — Medication 1 CAPSULE: at 13:16

## 2018-04-12 RX ADMIN — LEVETIRACETAM 750 MG: 100 SOLUTION ORAL at 20:42

## 2018-04-12 RX ADMIN — IPRATROPIUM BROMIDE AND ALBUTEROL SULFATE 3 ML: .5; 3 SOLUTION RESPIRATORY (INHALATION) at 06:52

## 2018-04-12 RX ADMIN — Medication 10 ML: at 10:26

## 2018-04-12 RX ADMIN — OXCARBAZEPINE 900 MG: 300 TABLET, FILM COATED ORAL at 08:08

## 2018-04-12 RX ADMIN — CITALOPRAM HYDROBROMIDE 20 MG: 20 TABLET ORAL at 20:41

## 2018-04-12 RX ADMIN — IPRATROPIUM BROMIDE AND ALBUTEROL SULFATE 3 ML: .5; 3 SOLUTION RESPIRATORY (INHALATION) at 13:20

## 2018-04-12 RX ADMIN — HEPARIN SODIUM 5000 UNITS: 5000 INJECTION, SOLUTION INTRAVENOUS; SUBCUTANEOUS at 20:42

## 2018-04-12 RX ADMIN — SODIUM CHLORIDE 125 ML/HR: 9 INJECTION, SOLUTION INTRAVENOUS at 00:13

## 2018-04-12 RX ADMIN — Medication 10 ML: at 00:14

## 2018-04-12 RX ADMIN — CASTOR OIL AND BALSAM, PERU: 788; 87 OINTMENT TOPICAL at 20:41

## 2018-04-12 RX ADMIN — PANTOPRAZOLE SODIUM 40 MG: 40 INJECTION, POWDER, FOR SOLUTION INTRAVENOUS at 05:56

## 2018-04-12 RX ADMIN — DANTROLENE SODIUM 25 MG: 25 CAPSULE ORAL at 20:41

## 2018-04-12 RX ADMIN — SODIUM HYPOCHLORITE: 1.25 SOLUTION TOPICAL at 20:41

## 2018-04-12 RX ADMIN — INSULIN ASPART 2 UNITS: 100 INJECTION, SOLUTION INTRAVENOUS; SUBCUTANEOUS at 18:01

## 2018-04-12 RX ADMIN — CHLORHEXIDINE GLUCONATE 15 ML: 1.2 RINSE ORAL at 20:41

## 2018-04-12 RX ADMIN — OXCARBAZEPINE 900 MG: 300 TABLET, FILM COATED ORAL at 20:41

## 2018-04-12 RX ADMIN — BACLOFEN 20 MG: 10 TABLET ORAL at 20:41

## 2018-04-12 RX ADMIN — SODIUM HYPOCHLORITE: 1.25 SOLUTION TOPICAL at 13:18

## 2018-04-12 RX ADMIN — PROPOFOL 20 MCG/KG/MIN: 10 INJECTION, EMULSION INTRAVENOUS at 05:56

## 2018-04-12 RX ADMIN — SODIUM CHLORIDE 125 ML/HR: 9 INJECTION, SOLUTION INTRAVENOUS at 10:30

## 2018-04-12 RX ADMIN — DANTROLENE SODIUM 25 MG: 25 CAPSULE ORAL at 08:08

## 2018-04-12 RX ADMIN — AZTREONAM 2 G: 2 INJECTION, POWDER, FOR SOLUTION INTRAMUSCULAR; INTRAVENOUS at 17:59

## 2018-04-12 RX ADMIN — INSULIN ASPART 2 UNITS: 100 INJECTION, SOLUTION INTRAVENOUS; SUBCUTANEOUS at 20:54

## 2018-04-12 RX ADMIN — CASTOR OIL AND BALSAM, PERU: 788; 87 OINTMENT TOPICAL at 13:16

## 2018-04-12 RX ADMIN — CHLORHEXIDINE GLUCONATE 15 ML: 1.2 RINSE ORAL at 00:14

## 2018-04-12 RX ADMIN — VANCOMYCIN HYDROCHLORIDE 1000 MG: 5 INJECTION, POWDER, LYOPHILIZED, FOR SOLUTION INTRAVENOUS at 18:02

## 2018-04-12 RX ADMIN — LEVOTHYROXINE SODIUM 75 MCG: 75 TABLET ORAL at 06:36

## 2018-04-12 RX ADMIN — VANCOMYCIN HYDROCHLORIDE 1000 MG: 5 INJECTION, POWDER, LYOPHILIZED, FOR SOLUTION INTRAVENOUS at 05:56

## 2018-04-12 RX ADMIN — EPINEPHRINE 1 MG: 0.1 INJECTION, SOLUTION ENDOTRACHEAL; INTRACARDIAC; INTRAVENOUS at 10:24

## 2018-04-12 RX ADMIN — IPRATROPIUM BROMIDE AND ALBUTEROL SULFATE 3 ML: .5; 3 SOLUTION RESPIRATORY (INHALATION) at 00:55

## 2018-04-12 RX ADMIN — PROPOFOL 30 MCG/KG/MIN: 10 INJECTION, EMULSION INTRAVENOUS at 13:26

## 2018-04-12 RX ADMIN — SODIUM CHLORIDE, PRESERVATIVE FREE 300 UNITS: 5 INJECTION INTRAVENOUS at 20:42

## 2018-04-12 RX ADMIN — SODIUM CHLORIDE 1000 ML: 900 IRRIGANT IRRIGATION at 10:25

## 2018-04-12 RX ADMIN — NOREPINEPHRINE BITARTRATE 0.02 MCG/KG/MIN: 1 INJECTION INTRAVENOUS at 10:30

## 2018-04-12 RX ADMIN — Medication 10 ML: at 20:43

## 2018-04-12 RX ADMIN — LEVETIRACETAM 750 MG: 100 SOLUTION ORAL at 13:18

## 2018-04-12 RX ADMIN — SODIUM CHLORIDE 1000 ML: 9 INJECTION, SOLUTION INTRAVENOUS at 10:24

## 2018-04-12 RX ADMIN — PROPOFOL 45 MCG/KG/MIN: 10 INJECTION, EMULSION INTRAVENOUS at 20:40

## 2018-04-12 RX ADMIN — BACLOFEN 20 MG: 10 TABLET ORAL at 17:58

## 2018-04-12 RX ADMIN — AZTREONAM 2 G: 2 INJECTION, POWDER, FOR SOLUTION INTRAMUSCULAR; INTRAVENOUS at 11:09

## 2018-04-12 RX ADMIN — IPRATROPIUM BROMIDE AND ALBUTEROL SULFATE 3 ML: .5; 3 SOLUTION RESPIRATORY (INHALATION) at 18:51

## 2018-04-12 RX ADMIN — ATORVASTATIN CALCIUM 10 MG: 10 TABLET, FILM COATED ORAL at 20:41

## 2018-04-13 LAB
A-A DO2: >300 MMHG (ref 0–300)
ANION GAP SERPL CALCULATED.3IONS-SCNC: 7.7 MMOL/L (ref 3.6–11.2)
ARTERIAL PATENCY WRIST A: POSITIVE
ATMOSPHERIC PRESS: 730 MMHG
BACTERIA SPEC AEROBE CULT: NORMAL
BASE EXCESS BLDA CALC-SCNC: -0.9 MMOL/L
BASOPHILS # BLD AUTO: 0.04 10*3/MM3 (ref 0–0.3)
BASOPHILS NFR BLD AUTO: 0.3 % (ref 0–2)
BDY SITE: ABNORMAL
BODY TEMPERATURE: 98.6 C
BUN BLD-MCNC: 7 MG/DL (ref 7–21)
BUN/CREAT SERPL: 18.4 (ref 7–25)
CALCIUM SPEC-SCNC: 8.4 MG/DL (ref 7.7–10)
CHLORIDE SERPL-SCNC: 108 MMOL/L (ref 99–112)
CO2 SERPL-SCNC: 21.3 MMOL/L (ref 24.3–31.9)
COHGB MFR BLD: 0.5 % (ref 0–5)
CREAT BLD-MCNC: 0.38 MG/DL (ref 0.43–1.29)
DEPRECATED RDW RBC AUTO: 52.3 FL (ref 37–54)
EOSINOPHIL # BLD AUTO: 0.39 10*3/MM3 (ref 0–0.7)
EOSINOPHIL NFR BLD AUTO: 3 % (ref 0–5)
ERYTHROCYTE [DISTWIDTH] IN BLOOD BY AUTOMATED COUNT: 18.5 % (ref 11.5–14.5)
GFR SERPL CREATININE-BSD FRML MDRD: >150 ML/MIN/1.73
GLUCOSE BLD-MCNC: 157 MG/DL (ref 70–110)
GLUCOSE BLDC GLUCOMTR-MCNC: 123 MG/DL (ref 70–130)
GLUCOSE BLDC GLUCOMTR-MCNC: 140 MG/DL (ref 70–130)
GLUCOSE BLDC GLUCOMTR-MCNC: 157 MG/DL (ref 70–130)
GLUCOSE BLDC GLUCOMTR-MCNC: 157 MG/DL (ref 70–130)
HCO3 BLDA-SCNC: 20.7 MMOL/L (ref 22–26)
HCT VFR BLD AUTO: 34.6 % (ref 42–52)
HCT VFR BLD CALC: 36 % (ref 42–52)
HGB BLD-MCNC: 11.3 G/DL (ref 14–18)
HGB BLDA-MCNC: 12.1 G/DL (ref 12–16)
HOROWITZ INDEX BLD+IHG-RTO: 80 %
IMM GRANULOCYTES # BLD: 0.11 10*3/MM3 (ref 0–0.03)
IMM GRANULOCYTES NFR BLD: 0.8 % (ref 0–0.5)
LYMPHOCYTES # BLD AUTO: 1.39 10*3/MM3 (ref 1–3)
LYMPHOCYTES NFR BLD AUTO: 10.7 % (ref 21–51)
MCH RBC QN AUTO: 26 PG (ref 27–33)
MCHC RBC AUTO-ENTMCNC: 32.7 G/DL (ref 33–37)
MCV RBC AUTO: 79.5 FL (ref 80–94)
METHGB BLD QL: 0.3 % (ref 0–3)
MODALITY: ABNORMAL
MONOCYTES # BLD AUTO: 0.99 10*3/MM3 (ref 0.1–0.9)
MONOCYTES NFR BLD AUTO: 7.6 % (ref 0–10)
NEUTROPHILS # BLD AUTO: 10.09 10*3/MM3 (ref 1.4–6.5)
NEUTROPHILS NFR BLD AUTO: 77.6 % (ref 30–70)
OSMOLALITY SERPL CALC.SUM OF ELEC: 275 MOSM/KG (ref 273–305)
OXYHGB MFR BLDV: 98.5 % (ref 85–100)
PCO2 BLDA: 25.8 MM HG (ref 35–45)
PEEP RESPIRATORY: 5 CM[H2O]
PH BLDA: 7.52 PH UNITS (ref 7.35–7.45)
PLATELET # BLD AUTO: 161 10*3/MM3 (ref 130–400)
PMV BLD AUTO: 11.5 FL (ref 6–10)
PO2 BLDA: 167.1 MM HG (ref 80–100)
POTASSIUM BLD-SCNC: 3.1 MMOL/L (ref 3.5–5.3)
POTASSIUM BLD-SCNC: 4.2 MMOL/L (ref 3.5–5.3)
RBC # BLD AUTO: 4.35 10*6/MM3 (ref 4.7–6.1)
SAO2 % BLDCOA: 99.3 % (ref 90–100)
SET MECH RESP RATE: 20
SODIUM BLD-SCNC: 137 MMOL/L (ref 135–153)
TOTAL RATE: 20 BREATHS/MINUTE
VANCOMYCIN TROUGH SERPL-MCNC: 18.4 MCG/ML (ref 5–15)
VENTILATOR MODE: AC
VT ON VENT VENT: 450 ML
WBC NRBC COR # BLD: 13.01 10*3/MM3 (ref 4.5–12.5)

## 2018-04-13 PROCEDURE — 36600 WITHDRAWAL OF ARTERIAL BLOOD: CPT | Performed by: INTERNAL MEDICINE

## 2018-04-13 PROCEDURE — 94799 UNLISTED PULMONARY SVC/PX: CPT

## 2018-04-13 PROCEDURE — 99232 SBSQ HOSP IP/OBS MODERATE 35: CPT | Performed by: INTERNAL MEDICINE

## 2018-04-13 PROCEDURE — 25010000002 HEPARIN FLUSH (PORCINE) 100 UNIT/ML SOLUTION: Performed by: INTERNAL MEDICINE

## 2018-04-13 PROCEDURE — 25010000002 PROPOFOL 1000 MG/ML EMULSION: Performed by: INTERNAL MEDICINE

## 2018-04-13 PROCEDURE — 82375 ASSAY CARBOXYHB QUANT: CPT | Performed by: INTERNAL MEDICINE

## 2018-04-13 PROCEDURE — 63710000001 INSULIN ASPART PER 5 UNITS: Performed by: INTERNAL MEDICINE

## 2018-04-13 PROCEDURE — 85025 COMPLETE CBC W/AUTO DIFF WBC: CPT | Performed by: INTERNAL MEDICINE

## 2018-04-13 PROCEDURE — 82962 GLUCOSE BLOOD TEST: CPT

## 2018-04-13 PROCEDURE — 99291 CRITICAL CARE FIRST HOUR: CPT | Performed by: INTERNAL MEDICINE

## 2018-04-13 PROCEDURE — 80048 BASIC METABOLIC PNL TOTAL CA: CPT | Performed by: INTERNAL MEDICINE

## 2018-04-13 PROCEDURE — 83050 HGB METHEMOGLOBIN QUAN: CPT | Performed by: INTERNAL MEDICINE

## 2018-04-13 PROCEDURE — 84132 ASSAY OF SERUM POTASSIUM: CPT | Performed by: INTERNAL MEDICINE

## 2018-04-13 PROCEDURE — 25010000002 HEPARIN (PORCINE) PER 1000 UNITS: Performed by: INTERNAL MEDICINE

## 2018-04-13 PROCEDURE — 82805 BLOOD GASES W/O2 SATURATION: CPT | Performed by: INTERNAL MEDICINE

## 2018-04-13 PROCEDURE — 80202 ASSAY OF VANCOMYCIN: CPT

## 2018-04-13 PROCEDURE — 25010000002 VANCOMYCIN PER 500 MG: Performed by: INTERNAL MEDICINE

## 2018-04-13 PROCEDURE — 94003 VENT MGMT INPAT SUBQ DAY: CPT

## 2018-04-13 RX ORDER — POTASSIUM CHLORIDE 1.5 G/1.77G
40 POWDER, FOR SOLUTION ORAL AS NEEDED
Status: DISCONTINUED | OUTPATIENT
Start: 2018-04-13 | End: 2018-05-02 | Stop reason: HOSPADM

## 2018-04-13 RX ORDER — POTASSIUM CHLORIDE 750 MG/1
40 CAPSULE, EXTENDED RELEASE ORAL AS NEEDED
Status: DISCONTINUED | OUTPATIENT
Start: 2018-04-13 | End: 2018-05-02 | Stop reason: HOSPADM

## 2018-04-13 RX ORDER — POTASSIUM CHLORIDE 1.5 G/1.77G
40 POWDER, FOR SOLUTION ORAL EVERY 4 HOURS
Status: COMPLETED | OUTPATIENT
Start: 2018-04-13 | End: 2018-04-13

## 2018-04-13 RX ORDER — POTASSIUM CHLORIDE 7.45 MG/ML
10 INJECTION INTRAVENOUS
Status: DISCONTINUED | OUTPATIENT
Start: 2018-04-13 | End: 2018-05-02 | Stop reason: HOSPADM

## 2018-04-13 RX ADMIN — OXCARBAZEPINE 900 MG: 300 TABLET, FILM COATED ORAL at 20:23

## 2018-04-13 RX ADMIN — PANTOPRAZOLE SODIUM 40 MG: 40 INJECTION, POWDER, FOR SOLUTION INTRAVENOUS at 05:19

## 2018-04-13 RX ADMIN — POTASSIUM CHLORIDE 40 MEQ: 1.5 POWDER, FOR SOLUTION ORAL at 09:51

## 2018-04-13 RX ADMIN — DANTROLENE SODIUM 25 MG: 25 CAPSULE ORAL at 20:21

## 2018-04-13 RX ADMIN — OXCARBAZEPINE 900 MG: 300 TABLET, FILM COATED ORAL at 09:50

## 2018-04-13 RX ADMIN — LEVETIRACETAM 750 MG: 100 SOLUTION ORAL at 09:49

## 2018-04-13 RX ADMIN — INSULIN ASPART 2 UNITS: 100 INJECTION, SOLUTION INTRAVENOUS; SUBCUTANEOUS at 17:41

## 2018-04-13 RX ADMIN — Medication 3 ML: at 15:56

## 2018-04-13 RX ADMIN — PROPOFOL 45 MCG/KG/MIN: 10 INJECTION, EMULSION INTRAVENOUS at 01:31

## 2018-04-13 RX ADMIN — POTASSIUM CHLORIDE 40 MEQ: 1.5 POWDER, FOR SOLUTION ORAL at 14:03

## 2018-04-13 RX ADMIN — SODIUM HYPOCHLORITE: 1.25 SOLUTION TOPICAL at 20:22

## 2018-04-13 RX ADMIN — PROPOFOL 45 MCG/KG/MIN: 10 INJECTION, EMULSION INTRAVENOUS at 08:46

## 2018-04-13 RX ADMIN — IPRATROPIUM BROMIDE AND ALBUTEROL SULFATE 3 ML: .5; 3 SOLUTION RESPIRATORY (INHALATION) at 00:52

## 2018-04-13 RX ADMIN — CHLORHEXIDINE GLUCONATE 15 ML: 1.2 RINSE ORAL at 09:52

## 2018-04-13 RX ADMIN — DEXMEDETOMIDINE HYDROCHLORIDE 0.2 MCG/KG/HR: 100 INJECTION, SOLUTION INTRAVENOUS at 11:04

## 2018-04-13 RX ADMIN — Medication 10 ML: at 20:22

## 2018-04-13 RX ADMIN — CASTOR OIL AND BALSAM, PERU: 788; 87 OINTMENT TOPICAL at 09:52

## 2018-04-13 RX ADMIN — INSULIN ASPART 2 UNITS: 100 INJECTION, SOLUTION INTRAVENOUS; SUBCUTANEOUS at 12:09

## 2018-04-13 RX ADMIN — HEPARIN SODIUM 5000 UNITS: 5000 INJECTION, SOLUTION INTRAVENOUS; SUBCUTANEOUS at 20:21

## 2018-04-13 RX ADMIN — DANTROLENE SODIUM 25 MG: 25 CAPSULE ORAL at 09:50

## 2018-04-13 RX ADMIN — CITALOPRAM HYDROBROMIDE 20 MG: 20 TABLET ORAL at 20:21

## 2018-04-13 RX ADMIN — SODIUM HYPOCHLORITE: 1.25 SOLUTION TOPICAL at 11:04

## 2018-04-13 RX ADMIN — POTASSIUM CHLORIDE 40 MEQ: 1.5 POWDER, FOR SOLUTION ORAL at 17:41

## 2018-04-13 RX ADMIN — AZTREONAM 2 G: 2 INJECTION, POWDER, FOR SOLUTION INTRAMUSCULAR; INTRAVENOUS at 09:52

## 2018-04-13 RX ADMIN — Medication 10 ML: at 11:05

## 2018-04-13 RX ADMIN — AZTREONAM 2 G: 2 INJECTION, POWDER, FOR SOLUTION INTRAMUSCULAR; INTRAVENOUS at 00:02

## 2018-04-13 RX ADMIN — ATORVASTATIN CALCIUM 10 MG: 10 TABLET, FILM COATED ORAL at 20:21

## 2018-04-13 RX ADMIN — AZTREONAM 2 G: 2 INJECTION, POWDER, FOR SOLUTION INTRAMUSCULAR; INTRAVENOUS at 15:55

## 2018-04-13 RX ADMIN — IPRATROPIUM BROMIDE AND ALBUTEROL SULFATE 3 ML: .5; 3 SOLUTION RESPIRATORY (INHALATION) at 13:21

## 2018-04-13 RX ADMIN — CASTOR OIL AND BALSAM, PERU: 788; 87 OINTMENT TOPICAL at 20:22

## 2018-04-13 RX ADMIN — VANCOMYCIN HYDROCHLORIDE 1000 MG: 5 INJECTION, POWDER, LYOPHILIZED, FOR SOLUTION INTRAVENOUS at 05:19

## 2018-04-13 RX ADMIN — BACLOFEN 20 MG: 10 TABLET ORAL at 17:41

## 2018-04-13 RX ADMIN — Medication 1 CAPSULE: at 09:50

## 2018-04-13 RX ADMIN — BACLOFEN 20 MG: 10 TABLET ORAL at 20:21

## 2018-04-13 RX ADMIN — VANCOMYCIN HYDROCHLORIDE 1000 MG: 5 INJECTION, POWDER, LYOPHILIZED, FOR SOLUTION INTRAVENOUS at 17:46

## 2018-04-13 RX ADMIN — CHLORHEXIDINE GLUCONATE 15 ML: 1.2 RINSE ORAL at 20:23

## 2018-04-13 RX ADMIN — SODIUM CHLORIDE 125 ML/HR: 9 INJECTION, SOLUTION INTRAVENOUS at 00:02

## 2018-04-13 RX ADMIN — HEPARIN SODIUM 5000 UNITS: 5000 INJECTION, SOLUTION INTRAVENOUS; SUBCUTANEOUS at 09:50

## 2018-04-13 RX ADMIN — LEVETIRACETAM 750 MG: 100 SOLUTION ORAL at 20:21

## 2018-04-13 RX ADMIN — IPRATROPIUM BROMIDE AND ALBUTEROL SULFATE 3 ML: .5; 3 SOLUTION RESPIRATORY (INHALATION) at 06:57

## 2018-04-13 RX ADMIN — PROPOFOL 25 MCG/KG/MIN: 10 INJECTION, EMULSION INTRAVENOUS at 18:42

## 2018-04-13 RX ADMIN — IPRATROPIUM BROMIDE AND ALBUTEROL SULFATE 3 ML: .5; 3 SOLUTION RESPIRATORY (INHALATION) at 18:40

## 2018-04-13 RX ADMIN — SODIUM CHLORIDE, PRESERVATIVE FREE 300 UNITS: 5 INJECTION INTRAVENOUS at 20:21

## 2018-04-13 RX ADMIN — Medication 10 ML: at 15:55

## 2018-04-13 RX ADMIN — LEVOTHYROXINE SODIUM 75 MCG: 75 TABLET ORAL at 06:16

## 2018-04-14 ENCOUNTER — APPOINTMENT (OUTPATIENT)
Dept: GENERAL RADIOLOGY | Facility: HOSPITAL | Age: 43
End: 2018-04-14

## 2018-04-14 LAB
A-A DO2: 149.1 MMHG (ref 0–300)
ANION GAP SERPL CALCULATED.3IONS-SCNC: 9.5 MMOL/L (ref 3.6–11.2)
ARTERIAL PATENCY WRIST A: POSITIVE
ATMOSPHERIC PRESS: 727 MMHG
BASE EXCESS BLDA CALC-SCNC: -2.5 MMOL/L
BASOPHILS # BLD AUTO: 0.07 10*3/MM3 (ref 0–0.3)
BASOPHILS NFR BLD AUTO: 0.7 % (ref 0–2)
BDY SITE: ABNORMAL
BODY TEMPERATURE: 98.6 C
BUN BLD-MCNC: 8 MG/DL (ref 7–21)
BUN/CREAT SERPL: 21.1 (ref 7–25)
CALCIUM SPEC-SCNC: 8.3 MG/DL (ref 7.7–10)
CHLORIDE SERPL-SCNC: 108 MMOL/L (ref 99–112)
CO2 SERPL-SCNC: 21.5 MMOL/L (ref 24.3–31.9)
COHGB MFR BLD: 0.8 % (ref 0–5)
CREAT BLD-MCNC: 0.38 MG/DL (ref 0.43–1.29)
DEPRECATED RDW RBC AUTO: 52.4 FL (ref 37–54)
EOSINOPHIL # BLD AUTO: 0.3 10*3/MM3 (ref 0–0.7)
EOSINOPHIL NFR BLD AUTO: 3.2 % (ref 0–5)
ERYTHROCYTE [DISTWIDTH] IN BLOOD BY AUTOMATED COUNT: 18.1 % (ref 11.5–14.5)
GFR SERPL CREATININE-BSD FRML MDRD: >150 ML/MIN/1.73
GLUCOSE BLD-MCNC: 126 MG/DL (ref 70–110)
GLUCOSE BLDC GLUCOMTR-MCNC: 173 MG/DL (ref 70–130)
GLUCOSE BLDC GLUCOMTR-MCNC: 178 MG/DL (ref 70–130)
GLUCOSE BLDC GLUCOMTR-MCNC: 181 MG/DL (ref 70–130)
GLUCOSE BLDC GLUCOMTR-MCNC: 202 MG/DL (ref 70–130)
GLUCOSE BLDC GLUCOMTR-MCNC: 208 MG/DL (ref 70–130)
HCO3 BLDA-SCNC: 21 MMOL/L (ref 22–26)
HCT VFR BLD AUTO: 35.6 % (ref 42–52)
HCT VFR BLD CALC: 35 % (ref 42–52)
HGB BLD-MCNC: 11.5 G/DL (ref 14–18)
HGB BLDA-MCNC: 11.8 G/DL (ref 12–16)
HOROWITZ INDEX BLD+IHG-RTO: 40 %
IMM GRANULOCYTES # BLD: 0.08 10*3/MM3 (ref 0–0.03)
IMM GRANULOCYTES NFR BLD: 0.9 % (ref 0–0.5)
LYMPHOCYTES # BLD AUTO: 1.84 10*3/MM3 (ref 1–3)
LYMPHOCYTES NFR BLD AUTO: 19.6 % (ref 21–51)
MCH RBC QN AUTO: 25.5 PG (ref 27–33)
MCHC RBC AUTO-ENTMCNC: 32.3 G/DL (ref 33–37)
MCV RBC AUTO: 78.9 FL (ref 80–94)
METHGB BLD QL: 0.3 % (ref 0–3)
MODALITY: ABNORMAL
MONOCYTES # BLD AUTO: 1 10*3/MM3 (ref 0.1–0.9)
MONOCYTES NFR BLD AUTO: 10.7 % (ref 0–10)
NEUTROPHILS # BLD AUTO: 6.08 10*3/MM3 (ref 1.4–6.5)
NEUTROPHILS NFR BLD AUTO: 64.9 % (ref 30–70)
OSMOLALITY SERPL CALC.SUM OF ELEC: 277.4 MOSM/KG (ref 273–305)
OXYHGB MFR BLDV: 95.7 % (ref 85–100)
PCO2 BLDA: 32.5 MM HG (ref 35–45)
PEEP RESPIRATORY: 5 CM[H2O]
PH BLDA: 7.43 PH UNITS (ref 7.35–7.45)
PLATELET # BLD AUTO: 147 10*3/MM3 (ref 130–400)
PMV BLD AUTO: 11 FL (ref 6–10)
PO2 BLDA: 85.5 MM HG (ref 80–100)
POTASSIUM BLD-SCNC: 4.4 MMOL/L (ref 3.5–5.3)
RBC # BLD AUTO: 4.51 10*6/MM3 (ref 4.7–6.1)
SAO2 % BLDCOA: 96.8 % (ref 90–100)
SET MECH RESP RATE: 16
SODIUM BLD-SCNC: 139 MMOL/L (ref 135–153)
VENTILATOR MODE: AC
VT ON VENT VENT: 450 ML
WBC NRBC COR # BLD: 9.37 10*3/MM3 (ref 4.5–12.5)

## 2018-04-14 PROCEDURE — 25010000002 HEPARIN (PORCINE) PER 1000 UNITS: Performed by: INTERNAL MEDICINE

## 2018-04-14 PROCEDURE — 82805 BLOOD GASES W/O2 SATURATION: CPT | Performed by: INTERNAL MEDICINE

## 2018-04-14 PROCEDURE — 25010000002 VANCOMYCIN PER 500 MG: Performed by: INTERNAL MEDICINE

## 2018-04-14 PROCEDURE — 36600 WITHDRAWAL OF ARTERIAL BLOOD: CPT | Performed by: INTERNAL MEDICINE

## 2018-04-14 PROCEDURE — 25010000002 PROPOFOL 1000 MG/ML EMULSION: Performed by: INTERNAL MEDICINE

## 2018-04-14 PROCEDURE — 80048 BASIC METABOLIC PNL TOTAL CA: CPT | Performed by: INTERNAL MEDICINE

## 2018-04-14 PROCEDURE — 63710000001 INSULIN ASPART PER 5 UNITS: Performed by: INTERNAL MEDICINE

## 2018-04-14 PROCEDURE — 94003 VENT MGMT INPAT SUBQ DAY: CPT

## 2018-04-14 PROCEDURE — 83050 HGB METHEMOGLOBIN QUAN: CPT | Performed by: INTERNAL MEDICINE

## 2018-04-14 PROCEDURE — 82962 GLUCOSE BLOOD TEST: CPT

## 2018-04-14 PROCEDURE — 94799 UNLISTED PULMONARY SVC/PX: CPT

## 2018-04-14 PROCEDURE — 71045 X-RAY EXAM CHEST 1 VIEW: CPT

## 2018-04-14 PROCEDURE — 85025 COMPLETE CBC W/AUTO DIFF WBC: CPT | Performed by: INTERNAL MEDICINE

## 2018-04-14 PROCEDURE — 82375 ASSAY CARBOXYHB QUANT: CPT | Performed by: INTERNAL MEDICINE

## 2018-04-14 PROCEDURE — 25010000002 FUROSEMIDE PER 20 MG: Performed by: INTERNAL MEDICINE

## 2018-04-14 PROCEDURE — 99232 SBSQ HOSP IP/OBS MODERATE 35: CPT | Performed by: INTERNAL MEDICINE

## 2018-04-14 PROCEDURE — 25010000002 HEPARIN FLUSH (PORCINE) 100 UNIT/ML SOLUTION: Performed by: INTERNAL MEDICINE

## 2018-04-14 PROCEDURE — 71045 X-RAY EXAM CHEST 1 VIEW: CPT | Performed by: RADIOLOGY

## 2018-04-14 RX ORDER — ACETAMINOPHEN 650 MG/1
650 SUPPOSITORY RECTAL EVERY 6 HOURS
Status: DISCONTINUED | OUTPATIENT
Start: 2018-04-15 | End: 2018-04-20

## 2018-04-14 RX ORDER — IBUPROFEN 600 MG/1
600 TABLET ORAL EVERY 6 HOURS PRN
Status: DISCONTINUED | OUTPATIENT
Start: 2018-04-14 | End: 2018-04-18

## 2018-04-14 RX ORDER — ACETAMINOPHEN 325 MG/1
650 TABLET ORAL EVERY 6 HOURS
Status: DISCONTINUED | OUTPATIENT
Start: 2018-04-15 | End: 2018-04-20

## 2018-04-14 RX ORDER — FUROSEMIDE 10 MG/ML
20 INJECTION INTRAMUSCULAR; INTRAVENOUS ONCE
Status: COMPLETED | OUTPATIENT
Start: 2018-04-14 | End: 2018-04-14

## 2018-04-14 RX ADMIN — PANTOPRAZOLE SODIUM 40 MG: 40 INJECTION, POWDER, FOR SOLUTION INTRAVENOUS at 05:19

## 2018-04-14 RX ADMIN — ACETAMINOPHEN 650 MG: 325 TABLET ORAL at 20:21

## 2018-04-14 RX ADMIN — AZTREONAM 2 G: 2 INJECTION, POWDER, FOR SOLUTION INTRAMUSCULAR; INTRAVENOUS at 00:02

## 2018-04-14 RX ADMIN — Medication 10 ML: at 09:14

## 2018-04-14 RX ADMIN — LEVETIRACETAM 750 MG: 100 SOLUTION ORAL at 09:11

## 2018-04-14 RX ADMIN — CASTOR OIL AND BALSAM, PERU: 788; 87 OINTMENT TOPICAL at 09:12

## 2018-04-14 RX ADMIN — SODIUM CHLORIDE, PRESERVATIVE FREE 300 UNITS: 5 INJECTION INTRAVENOUS at 09:28

## 2018-04-14 RX ADMIN — OXCARBAZEPINE 900 MG: 300 TABLET, FILM COATED ORAL at 09:11

## 2018-04-14 RX ADMIN — PROPOFOL 25 MCG/KG/MIN: 10 INJECTION, EMULSION INTRAVENOUS at 02:39

## 2018-04-14 RX ADMIN — HEPARIN SODIUM 5000 UNITS: 5000 INJECTION, SOLUTION INTRAVENOUS; SUBCUTANEOUS at 20:19

## 2018-04-14 RX ADMIN — IPRATROPIUM BROMIDE AND ALBUTEROL SULFATE 3 ML: .5; 3 SOLUTION RESPIRATORY (INHALATION) at 06:58

## 2018-04-14 RX ADMIN — AZTREONAM 2 G: 2 INJECTION, POWDER, FOR SOLUTION INTRAMUSCULAR; INTRAVENOUS at 23:54

## 2018-04-14 RX ADMIN — PROPOFOL 30 MCG/KG/MIN: 10 INJECTION, EMULSION INTRAVENOUS at 23:37

## 2018-04-14 RX ADMIN — AZTREONAM 2 G: 2 INJECTION, POWDER, FOR SOLUTION INTRAMUSCULAR; INTRAVENOUS at 16:35

## 2018-04-14 RX ADMIN — SODIUM CHLORIDE 125 ML/HR: 9 INJECTION, SOLUTION INTRAVENOUS at 00:02

## 2018-04-14 RX ADMIN — INSULIN ASPART 3 UNITS: 100 INJECTION, SOLUTION INTRAVENOUS; SUBCUTANEOUS at 17:20

## 2018-04-14 RX ADMIN — DANTROLENE SODIUM 25 MG: 25 CAPSULE ORAL at 20:21

## 2018-04-14 RX ADMIN — FUROSEMIDE 20 MG: 10 INJECTION, SOLUTION INTRAMUSCULAR; INTRAVENOUS at 14:18

## 2018-04-14 RX ADMIN — BACLOFEN 20 MG: 10 TABLET ORAL at 20:21

## 2018-04-14 RX ADMIN — IBUPROFEN 600 MG: 600 TABLET ORAL at 23:42

## 2018-04-14 RX ADMIN — CHLORHEXIDINE GLUCONATE 15 ML: 1.2 RINSE ORAL at 09:12

## 2018-04-14 RX ADMIN — DANTROLENE SODIUM 25 MG: 25 CAPSULE ORAL at 09:11

## 2018-04-14 RX ADMIN — LEVOTHYROXINE SODIUM 75 MCG: 75 TABLET ORAL at 05:19

## 2018-04-14 RX ADMIN — IPRATROPIUM BROMIDE AND ALBUTEROL SULFATE 3 ML: .5; 3 SOLUTION RESPIRATORY (INHALATION) at 13:43

## 2018-04-14 RX ADMIN — Medication 10 ML: at 20:20

## 2018-04-14 RX ADMIN — LEVETIRACETAM 750 MG: 100 SOLUTION ORAL at 20:20

## 2018-04-14 RX ADMIN — CITALOPRAM HYDROBROMIDE 20 MG: 20 TABLET ORAL at 20:21

## 2018-04-14 RX ADMIN — IPRATROPIUM BROMIDE AND ALBUTEROL SULFATE 3 ML: .5; 3 SOLUTION RESPIRATORY (INHALATION) at 01:00

## 2018-04-14 RX ADMIN — Medication 1 CAPSULE: at 09:12

## 2018-04-14 RX ADMIN — DEXMEDETOMIDINE HYDROCHLORIDE 0.2 MCG/KG/HR: 100 INJECTION, SOLUTION INTRAVENOUS at 17:18

## 2018-04-14 RX ADMIN — ACETAMINOPHEN 650 MG: 325 TABLET ORAL at 16:25

## 2018-04-14 RX ADMIN — IPRATROPIUM BROMIDE AND ALBUTEROL SULFATE 3 ML: .5; 3 SOLUTION RESPIRATORY (INHALATION) at 18:22

## 2018-04-14 RX ADMIN — INSULIN ASPART 2 UNITS: 100 INJECTION, SOLUTION INTRAVENOUS; SUBCUTANEOUS at 05:19

## 2018-04-14 RX ADMIN — PROPOFOL 30 MCG/KG/MIN: 10 INJECTION, EMULSION INTRAVENOUS at 16:46

## 2018-04-14 RX ADMIN — OXCARBAZEPINE 900 MG: 300 TABLET, FILM COATED ORAL at 20:20

## 2018-04-14 RX ADMIN — INSULIN ASPART 2 UNITS: 100 INJECTION, SOLUTION INTRAVENOUS; SUBCUTANEOUS at 20:19

## 2018-04-14 RX ADMIN — ATORVASTATIN CALCIUM 10 MG: 10 TABLET, FILM COATED ORAL at 20:19

## 2018-04-14 RX ADMIN — VANCOMYCIN HYDROCHLORIDE 1000 MG: 5 INJECTION, POWDER, LYOPHILIZED, FOR SOLUTION INTRAVENOUS at 05:19

## 2018-04-14 RX ADMIN — VANCOMYCIN HYDROCHLORIDE 1000 MG: 5 INJECTION, POWDER, LYOPHILIZED, FOR SOLUTION INTRAVENOUS at 17:40

## 2018-04-14 RX ADMIN — AZTREONAM 2 G: 2 INJECTION, POWDER, FOR SOLUTION INTRAMUSCULAR; INTRAVENOUS at 09:11

## 2018-04-14 RX ADMIN — CASTOR OIL AND BALSAM, PERU: 788; 87 OINTMENT TOPICAL at 20:19

## 2018-04-14 RX ADMIN — BACLOFEN 20 MG: 10 TABLET ORAL at 17:39

## 2018-04-14 RX ADMIN — HEPARIN SODIUM 5000 UNITS: 5000 INJECTION, SOLUTION INTRAVENOUS; SUBCUTANEOUS at 09:11

## 2018-04-14 RX ADMIN — SODIUM HYPOCHLORITE: 1.25 SOLUTION TOPICAL at 09:13

## 2018-04-14 RX ADMIN — SODIUM CHLORIDE, PRESERVATIVE FREE 300 UNITS: 5 INJECTION INTRAVENOUS at 20:21

## 2018-04-14 RX ADMIN — INSULIN ASPART 3 UNITS: 100 INJECTION, SOLUTION INTRAVENOUS; SUBCUTANEOUS at 11:50

## 2018-04-14 RX ADMIN — SODIUM HYPOCHLORITE: 1.25 SOLUTION TOPICAL at 20:19

## 2018-04-14 RX ADMIN — SODIUM CHLORIDE 125 ML/HR: 9 INJECTION, SOLUTION INTRAVENOUS at 09:14

## 2018-04-14 RX ADMIN — CHLORHEXIDINE GLUCONATE 15 ML: 1.2 RINSE ORAL at 20:19

## 2018-04-15 ENCOUNTER — APPOINTMENT (OUTPATIENT)
Dept: GENERAL RADIOLOGY | Facility: HOSPITAL | Age: 43
End: 2018-04-15

## 2018-04-15 PROBLEM — J69.0 ASPIRATION PNEUMONIA (HCC): Status: ACTIVE | Noted: 2018-04-15

## 2018-04-15 PROBLEM — A41.9 SEVERE SEPSIS (HCC): Status: ACTIVE | Noted: 2018-04-15

## 2018-04-15 PROBLEM — R65.20 SEVERE SEPSIS (HCC): Status: ACTIVE | Noted: 2018-04-15

## 2018-04-15 LAB
A-A DO2: 124.8 MMHG (ref 0–300)
ANION GAP SERPL CALCULATED.3IONS-SCNC: 5.6 MMOL/L (ref 3.6–11.2)
ARTERIAL PATENCY WRIST A: POSITIVE
ATMOSPHERIC PRESS: 724 MMHG
BACTERIA UR QL AUTO: NORMAL /HPF
BASE EXCESS BLDA CALC-SCNC: -3.2 MMOL/L
BASOPHILS # BLD AUTO: 0.05 10*3/MM3 (ref 0–0.3)
BASOPHILS NFR BLD AUTO: 0.5 % (ref 0–2)
BDY SITE: ABNORMAL
BILIRUB UR QL STRIP: NEGATIVE
BODY TEMPERATURE: 98.6 C
BUN BLD-MCNC: 10 MG/DL (ref 7–21)
BUN/CREAT SERPL: 26.3 (ref 7–25)
CALCIUM SPEC-SCNC: 7.9 MG/DL (ref 7.7–10)
CHLORIDE SERPL-SCNC: 105 MMOL/L (ref 99–112)
CLARITY UR: CLEAR
CO2 SERPL-SCNC: 21.4 MMOL/L (ref 24.3–31.9)
COHGB MFR BLD: 0.8 % (ref 0–5)
COLOR UR: YELLOW
CREAT BLD-MCNC: 0.38 MG/DL (ref 0.43–1.29)
CRP SERPL-MCNC: 10.13 MG/DL (ref 0–0.99)
DEPRECATED RDW RBC AUTO: 53.3 FL (ref 37–54)
EOSINOPHIL # BLD AUTO: 0.28 10*3/MM3 (ref 0–0.7)
EOSINOPHIL NFR BLD AUTO: 2.9 % (ref 0–5)
ERYTHROCYTE [DISTWIDTH] IN BLOOD BY AUTOMATED COUNT: 18 % (ref 11.5–14.5)
GFR SERPL CREATININE-BSD FRML MDRD: >150 ML/MIN/1.73
GLUCOSE BLD-MCNC: 193 MG/DL (ref 70–110)
GLUCOSE BLDC GLUCOMTR-MCNC: 186 MG/DL (ref 70–130)
GLUCOSE BLDC GLUCOMTR-MCNC: 188 MG/DL (ref 70–130)
GLUCOSE BLDC GLUCOMTR-MCNC: 199 MG/DL (ref 70–130)
GLUCOSE BLDC GLUCOMTR-MCNC: 211 MG/DL (ref 70–130)
GLUCOSE UR STRIP-MCNC: ABNORMAL MG/DL
HCO3 BLDA-SCNC: 20.5 MMOL/L (ref 22–26)
HCT VFR BLD AUTO: 36.2 % (ref 42–52)
HCT VFR BLD CALC: 33 % (ref 42–52)
HGB BLD-MCNC: 11.6 G/DL (ref 14–18)
HGB BLDA-MCNC: 11.3 G/DL (ref 12–16)
HGB UR QL STRIP.AUTO: NEGATIVE
HOROWITZ INDEX BLD+IHG-RTO: 40 %
HYALINE CASTS UR QL AUTO: NORMAL /LPF
IMM GRANULOCYTES # BLD: 0.14 10*3/MM3 (ref 0–0.03)
IMM GRANULOCYTES NFR BLD: 1.4 % (ref 0–0.5)
KETONES UR QL STRIP: NEGATIVE
LEUKOCYTE ESTERASE UR QL STRIP.AUTO: ABNORMAL
LYMPHOCYTES # BLD AUTO: 1.67 10*3/MM3 (ref 1–3)
LYMPHOCYTES NFR BLD AUTO: 17.3 % (ref 21–51)
MCH RBC QN AUTO: 26.1 PG (ref 27–33)
MCHC RBC AUTO-ENTMCNC: 32 G/DL (ref 33–37)
MCV RBC AUTO: 81.5 FL (ref 80–94)
METHGB BLD QL: 0.1 % (ref 0–3)
MODALITY: ABNORMAL
MONOCYTES # BLD AUTO: 1 10*3/MM3 (ref 0.1–0.9)
MONOCYTES NFR BLD AUTO: 10.3 % (ref 0–10)
NEUTROPHILS # BLD AUTO: 6.53 10*3/MM3 (ref 1.4–6.5)
NEUTROPHILS NFR BLD AUTO: 67.6 % (ref 30–70)
NITRITE UR QL STRIP: NEGATIVE
OSMOLALITY SERPL CALC.SUM OF ELEC: 268.8 MOSM/KG (ref 273–305)
OXYHGB MFR BLDV: 97.2 % (ref 85–100)
PCO2 BLDA: 32.1 MM HG (ref 35–45)
PEEP RESPIRATORY: 5 CM[H2O]
PH BLDA: 7.42 PH UNITS (ref 7.35–7.45)
PH UR STRIP.AUTO: 6.5 [PH] (ref 5–8)
PLATELET # BLD AUTO: 167 10*3/MM3 (ref 130–400)
PMV BLD AUTO: 11.1 FL (ref 6–10)
PO2 BLDA: 109.1 MM HG (ref 80–100)
POTASSIUM BLD-SCNC: 3.8 MMOL/L (ref 3.5–5.3)
PROT UR QL STRIP: NEGATIVE
RBC # BLD AUTO: 4.44 10*6/MM3 (ref 4.7–6.1)
RBC # UR: NORMAL /HPF
REF LAB TEST METHOD: NORMAL
SAO2 % BLDCOA: 98.1 % (ref 90–100)
SET MECH RESP RATE: 16
SODIUM BLD-SCNC: 132 MMOL/L (ref 135–153)
SP GR UR STRIP: 1.01 (ref 1–1.03)
SQUAMOUS #/AREA URNS HPF: NORMAL /HPF
UROBILINOGEN UR QL STRIP: ABNORMAL
VENTILATOR MODE: AC
VT ON VENT VENT: 450 ML
WBC NRBC COR # BLD: 9.67 10*3/MM3 (ref 4.5–12.5)
WBC UR QL AUTO: NORMAL /HPF

## 2018-04-15 PROCEDURE — 94799 UNLISTED PULMONARY SVC/PX: CPT

## 2018-04-15 PROCEDURE — 36600 WITHDRAWAL OF ARTERIAL BLOOD: CPT | Performed by: INTERNAL MEDICINE

## 2018-04-15 PROCEDURE — 82805 BLOOD GASES W/O2 SATURATION: CPT | Performed by: INTERNAL MEDICINE

## 2018-04-15 PROCEDURE — 94003 VENT MGMT INPAT SUBQ DAY: CPT

## 2018-04-15 PROCEDURE — 99232 SBSQ HOSP IP/OBS MODERATE 35: CPT | Performed by: INTERNAL MEDICINE

## 2018-04-15 PROCEDURE — 82962 GLUCOSE BLOOD TEST: CPT

## 2018-04-15 PROCEDURE — 25010000002 CEFEPIME: Performed by: INTERNAL MEDICINE

## 2018-04-15 PROCEDURE — 71045 X-RAY EXAM CHEST 1 VIEW: CPT

## 2018-04-15 PROCEDURE — 85025 COMPLETE CBC W/AUTO DIFF WBC: CPT | Performed by: INTERNAL MEDICINE

## 2018-04-15 PROCEDURE — 81001 URINALYSIS AUTO W/SCOPE: CPT | Performed by: INTERNAL MEDICINE

## 2018-04-15 PROCEDURE — 80048 BASIC METABOLIC PNL TOTAL CA: CPT | Performed by: INTERNAL MEDICINE

## 2018-04-15 PROCEDURE — 87040 BLOOD CULTURE FOR BACTERIA: CPT | Performed by: INTERNAL MEDICINE

## 2018-04-15 PROCEDURE — 63710000001 INSULIN ASPART PER 5 UNITS: Performed by: INTERNAL MEDICINE

## 2018-04-15 PROCEDURE — 82375 ASSAY CARBOXYHB QUANT: CPT | Performed by: INTERNAL MEDICINE

## 2018-04-15 PROCEDURE — 25010000002 HEPARIN (PORCINE) PER 1000 UNITS: Performed by: INTERNAL MEDICINE

## 2018-04-15 PROCEDURE — 83050 HGB METHEMOGLOBIN QUAN: CPT | Performed by: INTERNAL MEDICINE

## 2018-04-15 PROCEDURE — 86140 C-REACTIVE PROTEIN: CPT | Performed by: INTERNAL MEDICINE

## 2018-04-15 PROCEDURE — 25010000002 PROPOFOL 1000 MG/ML EMULSION: Performed by: INTERNAL MEDICINE

## 2018-04-15 PROCEDURE — 25010000002 VANCOMYCIN PER 500 MG: Performed by: INTERNAL MEDICINE

## 2018-04-15 PROCEDURE — 25010000002 HEPARIN FLUSH (PORCINE) 100 UNIT/ML SOLUTION: Performed by: INTERNAL MEDICINE

## 2018-04-15 PROCEDURE — 71045 X-RAY EXAM CHEST 1 VIEW: CPT | Performed by: RADIOLOGY

## 2018-04-15 RX ORDER — NALOXONE HCL 0.4 MG/ML
VIAL (ML) INJECTION
Status: DISCONTINUED
Start: 2018-04-15 | End: 2018-04-15 | Stop reason: WASHOUT

## 2018-04-15 RX ADMIN — IPRATROPIUM BROMIDE AND ALBUTEROL SULFATE 3 ML: .5; 3 SOLUTION RESPIRATORY (INHALATION) at 18:27

## 2018-04-15 RX ADMIN — OXCARBAZEPINE 900 MG: 300 TABLET, FILM COATED ORAL at 10:04

## 2018-04-15 RX ADMIN — METRONIDAZOLE 500 MG: 500 INJECTION, SOLUTION INTRAVENOUS at 17:39

## 2018-04-15 RX ADMIN — ACETAMINOPHEN 650 MG: 325 TABLET ORAL at 02:36

## 2018-04-15 RX ADMIN — CASTOR OIL AND BALSAM, PERU: 788; 87 OINTMENT TOPICAL at 20:21

## 2018-04-15 RX ADMIN — PROPOFOL 30 MCG/KG/MIN: 10 INJECTION, EMULSION INTRAVENOUS at 15:49

## 2018-04-15 RX ADMIN — SODIUM CHLORIDE 125 ML/HR: 9 INJECTION, SOLUTION INTRAVENOUS at 22:17

## 2018-04-15 RX ADMIN — IBUPROFEN 600 MG: 600 TABLET ORAL at 23:39

## 2018-04-15 RX ADMIN — IPRATROPIUM BROMIDE AND ALBUTEROL SULFATE 3 ML: .5; 3 SOLUTION RESPIRATORY (INHALATION) at 00:43

## 2018-04-15 RX ADMIN — CHLORHEXIDINE GLUCONATE 15 ML: 1.2 RINSE ORAL at 10:07

## 2018-04-15 RX ADMIN — CITALOPRAM HYDROBROMIDE 20 MG: 20 TABLET ORAL at 20:22

## 2018-04-15 RX ADMIN — PROPOFOL 30 MCG/KG/MIN: 10 INJECTION, EMULSION INTRAVENOUS at 20:21

## 2018-04-15 RX ADMIN — CHLORHEXIDINE GLUCONATE 15 ML: 1.2 RINSE ORAL at 20:22

## 2018-04-15 RX ADMIN — PROPOFOL 30 MCG/KG/MIN: 10 INJECTION, EMULSION INTRAVENOUS at 07:33

## 2018-04-15 RX ADMIN — SODIUM CHLORIDE, PRESERVATIVE FREE 300 UNITS: 5 INJECTION INTRAVENOUS at 10:05

## 2018-04-15 RX ADMIN — BACLOFEN 20 MG: 10 TABLET ORAL at 20:22

## 2018-04-15 RX ADMIN — HEPARIN SODIUM 5000 UNITS: 5000 INJECTION, SOLUTION INTRAVENOUS; SUBCUTANEOUS at 20:23

## 2018-04-15 RX ADMIN — CASTOR OIL AND BALSAM, PERU: 788; 87 OINTMENT TOPICAL at 10:08

## 2018-04-15 RX ADMIN — ACETAMINOPHEN 650 MG: 325 TABLET ORAL at 15:39

## 2018-04-15 RX ADMIN — Medication 1 CAPSULE: at 10:04

## 2018-04-15 RX ADMIN — VANCOMYCIN HYDROCHLORIDE 1000 MG: 5 INJECTION, POWDER, LYOPHILIZED, FOR SOLUTION INTRAVENOUS at 17:39

## 2018-04-15 RX ADMIN — SODIUM CHLORIDE, PRESERVATIVE FREE 300 UNITS: 5 INJECTION INTRAVENOUS at 20:21

## 2018-04-15 RX ADMIN — PANTOPRAZOLE SODIUM 40 MG: 40 INJECTION, POWDER, FOR SOLUTION INTRAVENOUS at 05:23

## 2018-04-15 RX ADMIN — SODIUM HYPOCHLORITE: 1.25 SOLUTION TOPICAL at 20:21

## 2018-04-15 RX ADMIN — IPRATROPIUM BROMIDE AND ALBUTEROL SULFATE 3 ML: .5; 3 SOLUTION RESPIRATORY (INHALATION) at 07:09

## 2018-04-15 RX ADMIN — ACETAMINOPHEN 650 MG: 325 TABLET ORAL at 20:22

## 2018-04-15 RX ADMIN — LEVETIRACETAM 750 MG: 100 SOLUTION ORAL at 20:23

## 2018-04-15 RX ADMIN — VANCOMYCIN HYDROCHLORIDE 1000 MG: 5 INJECTION, POWDER, LYOPHILIZED, FOR SOLUTION INTRAVENOUS at 05:23

## 2018-04-15 RX ADMIN — Medication 10 ML: at 10:07

## 2018-04-15 RX ADMIN — SODIUM CHLORIDE 125 ML/HR: 9 INJECTION, SOLUTION INTRAVENOUS at 01:05

## 2018-04-15 RX ADMIN — INSULIN ASPART 2 UNITS: 100 INJECTION, SOLUTION INTRAVENOUS; SUBCUTANEOUS at 17:42

## 2018-04-15 RX ADMIN — CEFEPIME 2 G: 2 INJECTION, POWDER, FOR SOLUTION INTRAVENOUS at 20:22

## 2018-04-15 RX ADMIN — ACETAMINOPHEN 650 MG: 325 TABLET ORAL at 10:03

## 2018-04-15 RX ADMIN — INSULIN ASPART 3 UNITS: 100 INJECTION, SOLUTION INTRAVENOUS; SUBCUTANEOUS at 20:23

## 2018-04-15 RX ADMIN — LEVOTHYROXINE SODIUM 75 MCG: 75 TABLET ORAL at 05:23

## 2018-04-15 RX ADMIN — DANTROLENE SODIUM 25 MG: 25 CAPSULE ORAL at 20:23

## 2018-04-15 RX ADMIN — INSULIN ASPART 2 UNITS: 100 INJECTION, SOLUTION INTRAVENOUS; SUBCUTANEOUS at 05:23

## 2018-04-15 RX ADMIN — ATORVASTATIN CALCIUM 10 MG: 10 TABLET, FILM COATED ORAL at 20:22

## 2018-04-15 RX ADMIN — SODIUM CHLORIDE 125 ML/HR: 9 INJECTION, SOLUTION INTRAVENOUS at 10:03

## 2018-04-15 RX ADMIN — HEPARIN SODIUM 5000 UNITS: 5000 INJECTION, SOLUTION INTRAVENOUS; SUBCUTANEOUS at 10:04

## 2018-04-15 RX ADMIN — LEVETIRACETAM 750 MG: 100 SOLUTION ORAL at 10:05

## 2018-04-15 RX ADMIN — DANTROLENE SODIUM 25 MG: 25 CAPSULE ORAL at 10:04

## 2018-04-15 RX ADMIN — BACLOFEN 20 MG: 10 TABLET ORAL at 17:38

## 2018-04-15 RX ADMIN — AZTREONAM 2 G: 2 INJECTION, POWDER, FOR SOLUTION INTRAMUSCULAR; INTRAVENOUS at 10:21

## 2018-04-15 RX ADMIN — INSULIN ASPART 2 UNITS: 100 INJECTION, SOLUTION INTRAVENOUS; SUBCUTANEOUS at 12:25

## 2018-04-15 RX ADMIN — Medication 10 ML: at 20:24

## 2018-04-15 RX ADMIN — OXCARBAZEPINE 900 MG: 300 TABLET, FILM COATED ORAL at 20:23

## 2018-04-15 RX ADMIN — SODIUM HYPOCHLORITE: 1.25 SOLUTION TOPICAL at 10:08

## 2018-04-15 RX ADMIN — IPRATROPIUM BROMIDE AND ALBUTEROL SULFATE 3 ML: .5; 3 SOLUTION RESPIRATORY (INHALATION) at 12:50

## 2018-04-16 LAB
A-A DO2: 123.6 MMHG (ref 0–300)
ANION GAP SERPL CALCULATED.3IONS-SCNC: 6.5 MMOL/L (ref 3.6–11.2)
ARTERIAL PATENCY WRIST A: POSITIVE
ATMOSPHERIC PRESS: 721 MMHG
BACTERIA SPEC AEROBE CULT: NORMAL
BACTERIA SPEC AEROBE CULT: NORMAL
BACTERIA SPEC RESP CULT: ABNORMAL
BASE EXCESS BLDA CALC-SCNC: -4.2 MMOL/L
BASOPHILS # BLD AUTO: 0.06 10*3/MM3 (ref 0–0.3)
BASOPHILS NFR BLD AUTO: 0.5 % (ref 0–2)
BDY SITE: ABNORMAL
BODY TEMPERATURE: 98.6 C
BUN BLD-MCNC: 10 MG/DL (ref 7–21)
BUN/CREAT SERPL: 31.3 (ref 7–25)
CALCIUM SPEC-SCNC: 7.7 MG/DL (ref 7.7–10)
CHLORIDE SERPL-SCNC: 106 MMOL/L (ref 99–112)
CO2 SERPL-SCNC: 21.5 MMOL/L (ref 24.3–31.9)
COHGB MFR BLD: 0.4 % (ref 0–5)
CREAT BLD-MCNC: 0.32 MG/DL (ref 0.43–1.29)
CRP SERPL-MCNC: 6.92 MG/DL (ref 0–0.99)
DEPRECATED RDW RBC AUTO: 52.4 FL (ref 37–54)
EOSINOPHIL # BLD AUTO: 0.34 10*3/MM3 (ref 0–0.7)
EOSINOPHIL NFR BLD AUTO: 2.7 % (ref 0–5)
ERYTHROCYTE [DISTWIDTH] IN BLOOD BY AUTOMATED COUNT: 17.9 % (ref 11.5–14.5)
GFR SERPL CREATININE-BSD FRML MDRD: >150 ML/MIN/1.73
GLUCOSE BLD-MCNC: 193 MG/DL (ref 70–110)
GLUCOSE BLDC GLUCOMTR-MCNC: 187 MG/DL (ref 70–130)
GLUCOSE BLDC GLUCOMTR-MCNC: 202 MG/DL (ref 70–130)
GLUCOSE BLDC GLUCOMTR-MCNC: 213 MG/DL (ref 70–130)
GLUCOSE BLDC GLUCOMTR-MCNC: 266 MG/DL (ref 70–130)
GRAM STN SPEC: ABNORMAL
HCO3 BLDA-SCNC: 20 MMOL/L (ref 22–26)
HCT VFR BLD AUTO: 34.8 % (ref 42–52)
HCT VFR BLD CALC: 32 % (ref 42–52)
HGB BLD-MCNC: 10.9 G/DL (ref 14–18)
HGB BLDA-MCNC: 10.8 G/DL (ref 12–16)
HOROWITZ INDEX BLD+IHG-RTO: 40 %
IMM GRANULOCYTES # BLD: 0.16 10*3/MM3 (ref 0–0.03)
IMM GRANULOCYTES NFR BLD: 1.3 % (ref 0–0.5)
LYMPHOCYTES # BLD AUTO: 1.04 10*3/MM3 (ref 1–3)
LYMPHOCYTES NFR BLD AUTO: 8.2 % (ref 21–51)
MCH RBC QN AUTO: 25.7 PG (ref 27–33)
MCHC RBC AUTO-ENTMCNC: 31.3 G/DL (ref 33–37)
MCV RBC AUTO: 82.1 FL (ref 80–94)
METHGB BLD QL: 0.3 % (ref 0–3)
MODALITY: ABNORMAL
MONOCYTES # BLD AUTO: 0.94 10*3/MM3 (ref 0.1–0.9)
MONOCYTES NFR BLD AUTO: 7.4 % (ref 0–10)
NEUTROPHILS # BLD AUTO: 10.21 10*3/MM3 (ref 1.4–6.5)
NEUTROPHILS NFR BLD AUTO: 79.9 % (ref 30–70)
OSMOLALITY SERPL CALC.SUM OF ELEC: 272.5 MOSM/KG (ref 273–305)
OXYHGB MFR BLDV: 97.1 % (ref 85–100)
PCO2 BLDA: 33.1 MM HG (ref 35–45)
PEEP RESPIRATORY: 5 CM[H2O]
PH BLDA: 7.4 PH UNITS (ref 7.35–7.45)
PLATELET # BLD AUTO: 162 10*3/MM3 (ref 130–400)
PMV BLD AUTO: 11.1 FL (ref 6–10)
PO2 BLDA: 107.9 MM HG (ref 80–100)
POTASSIUM BLD-SCNC: 4.1 MMOL/L (ref 3.5–5.3)
RBC # BLD AUTO: 4.24 10*6/MM3 (ref 4.7–6.1)
SAO2 % BLDCOA: 97.8 % (ref 90–100)
SET MECH RESP RATE: 16
SODIUM BLD-SCNC: 134 MMOL/L (ref 135–153)
VENTILATOR MODE: AC
VT ON VENT VENT: 450 ML
WBC NRBC COR # BLD: 12.75 10*3/MM3 (ref 4.5–12.5)

## 2018-04-16 PROCEDURE — 83050 HGB METHEMOGLOBIN QUAN: CPT | Performed by: INTERNAL MEDICINE

## 2018-04-16 PROCEDURE — 99233 SBSQ HOSP IP/OBS HIGH 50: CPT | Performed by: INTERNAL MEDICINE

## 2018-04-16 PROCEDURE — 82962 GLUCOSE BLOOD TEST: CPT

## 2018-04-16 PROCEDURE — 25010000002 FUROSEMIDE PER 20 MG: Performed by: INTERNAL MEDICINE

## 2018-04-16 PROCEDURE — 94003 VENT MGMT INPAT SUBQ DAY: CPT

## 2018-04-16 PROCEDURE — 36600 WITHDRAWAL OF ARTERIAL BLOOD: CPT | Performed by: INTERNAL MEDICINE

## 2018-04-16 PROCEDURE — 25010000002 HEPARIN (PORCINE) PER 1000 UNITS: Performed by: INTERNAL MEDICINE

## 2018-04-16 PROCEDURE — 25010000002 CEFEPIME: Performed by: INTERNAL MEDICINE

## 2018-04-16 PROCEDURE — 85025 COMPLETE CBC W/AUTO DIFF WBC: CPT | Performed by: INTERNAL MEDICINE

## 2018-04-16 PROCEDURE — 82805 BLOOD GASES W/O2 SATURATION: CPT | Performed by: INTERNAL MEDICINE

## 2018-04-16 PROCEDURE — 25010000002 HEPARIN FLUSH (PORCINE) 100 UNIT/ML SOLUTION: Performed by: INTERNAL MEDICINE

## 2018-04-16 PROCEDURE — 99291 CRITICAL CARE FIRST HOUR: CPT | Performed by: INTERNAL MEDICINE

## 2018-04-16 PROCEDURE — 94799 UNLISTED PULMONARY SVC/PX: CPT

## 2018-04-16 PROCEDURE — 82375 ASSAY CARBOXYHB QUANT: CPT | Performed by: INTERNAL MEDICINE

## 2018-04-16 PROCEDURE — 86140 C-REACTIVE PROTEIN: CPT | Performed by: NURSE PRACTITIONER

## 2018-04-16 PROCEDURE — 63710000001 INSULIN ASPART PER 5 UNITS: Performed by: INTERNAL MEDICINE

## 2018-04-16 PROCEDURE — 25010000002 VANCOMYCIN PER 500 MG: Performed by: INTERNAL MEDICINE

## 2018-04-16 PROCEDURE — 80048 BASIC METABOLIC PNL TOTAL CA: CPT | Performed by: INTERNAL MEDICINE

## 2018-04-16 RX ORDER — POTASSIUM CHLORIDE 1.5 G/1.77G
40 POWDER, FOR SOLUTION ORAL ONCE
Status: COMPLETED | OUTPATIENT
Start: 2018-04-16 | End: 2018-04-16

## 2018-04-16 RX ORDER — FUROSEMIDE 10 MG/ML
40 INJECTION INTRAMUSCULAR; INTRAVENOUS ONCE
Status: COMPLETED | OUTPATIENT
Start: 2018-04-16 | End: 2018-04-16

## 2018-04-16 RX ADMIN — LEVOTHYROXINE SODIUM 75 MCG: 75 TABLET ORAL at 05:05

## 2018-04-16 RX ADMIN — VANCOMYCIN HYDROCHLORIDE 1000 MG: 5 INJECTION, POWDER, LYOPHILIZED, FOR SOLUTION INTRAVENOUS at 17:41

## 2018-04-16 RX ADMIN — Medication 1 CAPSULE: at 08:29

## 2018-04-16 RX ADMIN — IBUPROFEN 600 MG: 600 TABLET ORAL at 17:41

## 2018-04-16 RX ADMIN — METRONIDAZOLE 500 MG: 500 INJECTION, SOLUTION INTRAVENOUS at 09:30

## 2018-04-16 RX ADMIN — HEPARIN SODIUM 5000 UNITS: 5000 INJECTION, SOLUTION INTRAVENOUS; SUBCUTANEOUS at 08:28

## 2018-04-16 RX ADMIN — SODIUM CHLORIDE, PRESERVATIVE FREE 300 UNITS: 5 INJECTION INTRAVENOUS at 08:29

## 2018-04-16 RX ADMIN — POTASSIUM CHLORIDE 40 MEQ: 1.5 POWDER, FOR SOLUTION ORAL at 09:30

## 2018-04-16 RX ADMIN — CASTOR OIL AND BALSAM, PERU: 788; 87 OINTMENT TOPICAL at 20:39

## 2018-04-16 RX ADMIN — ACETAMINOPHEN 650 MG: 325 TABLET ORAL at 20:38

## 2018-04-16 RX ADMIN — SODIUM HYPOCHLORITE: 1.25 SOLUTION TOPICAL at 08:30

## 2018-04-16 RX ADMIN — IPRATROPIUM BROMIDE AND ALBUTEROL SULFATE 3 ML: .5; 3 SOLUTION RESPIRATORY (INHALATION) at 00:20

## 2018-04-16 RX ADMIN — VANCOMYCIN HYDROCHLORIDE 1000 MG: 5 INJECTION, POWDER, LYOPHILIZED, FOR SOLUTION INTRAVENOUS at 05:04

## 2018-04-16 RX ADMIN — CITALOPRAM HYDROBROMIDE 20 MG: 20 TABLET ORAL at 20:40

## 2018-04-16 RX ADMIN — DEXMEDETOMIDINE HYDROCHLORIDE 1.3 MCG/KG/HR: 100 INJECTION, SOLUTION INTRAVENOUS at 20:35

## 2018-04-16 RX ADMIN — LEVETIRACETAM 750 MG: 100 SOLUTION ORAL at 08:30

## 2018-04-16 RX ADMIN — CEFEPIME 2 G: 2 INJECTION, POWDER, FOR SOLUTION INTRAVENOUS at 20:36

## 2018-04-16 RX ADMIN — CHLORHEXIDINE GLUCONATE 15 ML: 1.2 RINSE ORAL at 20:39

## 2018-04-16 RX ADMIN — ATORVASTATIN CALCIUM 10 MG: 10 TABLET, FILM COATED ORAL at 20:40

## 2018-04-16 RX ADMIN — OXCARBAZEPINE 900 MG: 300 TABLET, FILM COATED ORAL at 20:40

## 2018-04-16 RX ADMIN — IPRATROPIUM BROMIDE AND ALBUTEROL SULFATE 3 ML: .5; 3 SOLUTION RESPIRATORY (INHALATION) at 07:07

## 2018-04-16 RX ADMIN — ACETAMINOPHEN 650 MG: 650 SUPPOSITORY RECTAL at 14:01

## 2018-04-16 RX ADMIN — CHLORHEXIDINE GLUCONATE 15 ML: 1.2 RINSE ORAL at 08:31

## 2018-04-16 RX ADMIN — ACETAMINOPHEN 650 MG: 325 TABLET ORAL at 02:16

## 2018-04-16 RX ADMIN — CEFEPIME 2 G: 2 INJECTION, POWDER, FOR SOLUTION INTRAVENOUS at 05:05

## 2018-04-16 RX ADMIN — DEXMEDETOMIDINE HYDROCHLORIDE 1.5 MCG/KG/HR: 100 INJECTION, SOLUTION INTRAVENOUS at 08:30

## 2018-04-16 RX ADMIN — DEXMEDETOMIDINE HYDROCHLORIDE 0.2 MCG/KG/HR: 100 INJECTION, SOLUTION INTRAVENOUS at 02:17

## 2018-04-16 RX ADMIN — SODIUM CHLORIDE, PRESERVATIVE FREE 300 UNITS: 5 INJECTION INTRAVENOUS at 20:39

## 2018-04-16 RX ADMIN — FUROSEMIDE 40 MG: 10 INJECTION, SOLUTION INTRAMUSCULAR; INTRAVENOUS at 09:31

## 2018-04-16 RX ADMIN — INSULIN ASPART 4 UNITS: 100 INJECTION, SOLUTION INTRAVENOUS; SUBCUTANEOUS at 20:40

## 2018-04-16 RX ADMIN — DEXMEDETOMIDINE HYDROCHLORIDE 1.2 MCG/KG/HR: 100 INJECTION, SOLUTION INTRAVENOUS at 14:39

## 2018-04-16 RX ADMIN — BACLOFEN 20 MG: 10 TABLET ORAL at 20:39

## 2018-04-16 RX ADMIN — LEVETIRACETAM 750 MG: 100 SOLUTION ORAL at 20:39

## 2018-04-16 RX ADMIN — INSULIN ASPART 2 UNITS: 100 INJECTION, SOLUTION INTRAVENOUS; SUBCUTANEOUS at 05:06

## 2018-04-16 RX ADMIN — DANTROLENE SODIUM 25 MG: 25 CAPSULE ORAL at 20:39

## 2018-04-16 RX ADMIN — OXCARBAZEPINE 900 MG: 300 TABLET, FILM COATED ORAL at 08:29

## 2018-04-16 RX ADMIN — ACETAMINOPHEN 650 MG: 325 TABLET ORAL at 08:28

## 2018-04-16 RX ADMIN — Medication 10 ML: at 20:42

## 2018-04-16 RX ADMIN — METRONIDAZOLE 500 MG: 500 INJECTION, SOLUTION INTRAVENOUS at 17:42

## 2018-04-16 RX ADMIN — Medication 10 ML: at 08:31

## 2018-04-16 RX ADMIN — INSULIN ASPART 3 UNITS: 100 INJECTION, SOLUTION INTRAVENOUS; SUBCUTANEOUS at 11:02

## 2018-04-16 RX ADMIN — METRONIDAZOLE 500 MG: 500 INJECTION, SOLUTION INTRAVENOUS at 02:16

## 2018-04-16 RX ADMIN — PANTOPRAZOLE SODIUM 40 MG: 40 INJECTION, POWDER, FOR SOLUTION INTRAVENOUS at 05:05

## 2018-04-16 RX ADMIN — DANTROLENE SODIUM 25 MG: 25 CAPSULE ORAL at 08:29

## 2018-04-16 RX ADMIN — CEFEPIME 2 G: 2 INJECTION, POWDER, FOR SOLUTION INTRAVENOUS at 11:09

## 2018-04-16 RX ADMIN — SODIUM HYPOCHLORITE: 1.25 SOLUTION TOPICAL at 20:39

## 2018-04-16 RX ADMIN — HEPARIN SODIUM 5000 UNITS: 5000 INJECTION, SOLUTION INTRAVENOUS; SUBCUTANEOUS at 20:38

## 2018-04-16 RX ADMIN — INSULIN ASPART 3 UNITS: 100 INJECTION, SOLUTION INTRAVENOUS; SUBCUTANEOUS at 17:41

## 2018-04-16 RX ADMIN — BACLOFEN 20 MG: 10 TABLET ORAL at 17:41

## 2018-04-16 RX ADMIN — IPRATROPIUM BROMIDE AND ALBUTEROL SULFATE 3 ML: .5; 3 SOLUTION RESPIRATORY (INHALATION) at 18:41

## 2018-04-16 RX ADMIN — IPRATROPIUM BROMIDE AND ALBUTEROL SULFATE 3 ML: .5; 3 SOLUTION RESPIRATORY (INHALATION) at 13:14

## 2018-04-16 RX ADMIN — CASTOR OIL AND BALSAM, PERU: 788; 87 OINTMENT TOPICAL at 08:30

## 2018-04-17 ENCOUNTER — APPOINTMENT (OUTPATIENT)
Dept: CARDIOLOGY | Facility: HOSPITAL | Age: 43
End: 2018-04-17
Attending: INTERNAL MEDICINE

## 2018-04-17 ENCOUNTER — APPOINTMENT (OUTPATIENT)
Dept: GENERAL RADIOLOGY | Facility: HOSPITAL | Age: 43
End: 2018-04-17

## 2018-04-17 LAB
A-A DO2: 86.1 MMHG (ref 0–300)
ANION GAP SERPL CALCULATED.3IONS-SCNC: 10 MMOL/L (ref 3.6–11.2)
ARTERIAL PATENCY WRIST A: ABNORMAL
ATMOSPHERIC PRESS: 723 MMHG
BASE EXCESS BLDA CALC-SCNC: -0.7 MMOL/L
BASOPHILS # BLD AUTO: 0.09 10*3/MM3 (ref 0–0.3)
BASOPHILS NFR BLD AUTO: 0.8 % (ref 0–2)
BDY SITE: ABNORMAL
BH CV ECHO MEAS - % IVS THICK: 28.6 %
BH CV ECHO MEAS - % LVPW THICK: 67.8 %
BH CV ECHO MEAS - ACS: 2 CM
BH CV ECHO MEAS - AO MAX PG: 7 MMHG
BH CV ECHO MEAS - AO MEAN PG: 3.3 MMHG
BH CV ECHO MEAS - AO ROOT AREA (BSA CORRECTED): 1.4
BH CV ECHO MEAS - AO ROOT AREA: 5.9 CM^2
BH CV ECHO MEAS - AO ROOT DIAM: 2.7 CM
BH CV ECHO MEAS - AO V2 MAX: 132.7 CM/SEC
BH CV ECHO MEAS - AO V2 MEAN: 83.8 CM/SEC
BH CV ECHO MEAS - AO V2 VTI: 22.7 CM
BH CV ECHO MEAS - BSA(HAYCOCK): 2 M^2
BH CV ECHO MEAS - BSA: 2 M^2
BH CV ECHO MEAS - BZI_BMI: 24.5 KILOGRAMS/M^2
BH CV ECHO MEAS - BZI_METRIC_HEIGHT: 177.8 CM
BH CV ECHO MEAS - BZI_METRIC_WEIGHT: 77.6 KG
BH CV ECHO MEAS - EDV(CUBED): 95.1 ML
BH CV ECHO MEAS - EDV(TEICH): 95.6 ML
BH CV ECHO MEAS - EF(CUBED): 72.2 %
BH CV ECHO MEAS - EF(TEICH): 64 %
BH CV ECHO MEAS - ESV(CUBED): 26.5 ML
BH CV ECHO MEAS - ESV(TEICH): 34.5 ML
BH CV ECHO MEAS - FS: 34.7 %
BH CV ECHO MEAS - IVS/LVPW: 1
BH CV ECHO MEAS - IVSD: 1 CM
BH CV ECHO MEAS - IVSS: 1.3 CM
BH CV ECHO MEAS - LA DIMENSION: 2.2 CM
BH CV ECHO MEAS - LA/AO: 0.81
BH CV ECHO MEAS - LV MASS(C)D: 155.7 GRAMS
BH CV ECHO MEAS - LV MASS(C)DI: 79.7 GRAMS/M^2
BH CV ECHO MEAS - LV MASS(C)S: 151.6 GRAMS
BH CV ECHO MEAS - LV MASS(C)SI: 77.6 GRAMS/M^2
BH CV ECHO MEAS - LVIDD: 4.6 CM
BH CV ECHO MEAS - LVIDS: 3 CM
BH CV ECHO MEAS - LVOT AREA (M): 2.8 CM^2
BH CV ECHO MEAS - LVOT AREA: 2.7 CM^2
BH CV ECHO MEAS - LVOT DIAM: 1.9 CM
BH CV ECHO MEAS - LVPWD: 0.99 CM
BH CV ECHO MEAS - LVPWS: 1.7 CM
BH CV ECHO MEAS - MV A MAX VEL: 72.4 CM/SEC
BH CV ECHO MEAS - MV E MAX VEL: 121.2 CM/SEC
BH CV ECHO MEAS - MV E/A: 1.7
BH CV ECHO MEAS - PA ACC SLOPE: 1153 CM/SEC^2
BH CV ECHO MEAS - PA ACC TIME: 0.09 SEC
BH CV ECHO MEAS - PA PR(ACCEL): 37.8 MMHG
BH CV ECHO MEAS - SI(AO): 68.9 ML/M^2
BH CV ECHO MEAS - SI(CUBED): 35.1 ML/M^2
BH CV ECHO MEAS - SI(TEICH): 31.3 ML/M^2
BH CV ECHO MEAS - SV(AO): 134.6 ML
BH CV ECHO MEAS - SV(CUBED): 68.6 ML
BH CV ECHO MEAS - SV(TEICH): 61.1 ML
BODY TEMPERATURE: 98.6 C
BUN BLD-MCNC: 13 MG/DL (ref 7–21)
BUN/CREAT SERPL: 32.5 (ref 7–25)
CALCIUM SPEC-SCNC: 8.4 MG/DL (ref 7.7–10)
CHLORIDE SERPL-SCNC: 99 MMOL/L (ref 99–112)
CO2 SERPL-SCNC: 23 MMOL/L (ref 24.3–31.9)
COHGB MFR BLD: 1.2 % (ref 0–5)
CREAT BLD-MCNC: 0.4 MG/DL (ref 0.43–1.29)
CRP SERPL-MCNC: 9.76 MG/DL (ref 0–0.99)
DEPRECATED RDW RBC AUTO: 49.2 FL (ref 37–54)
EOSINOPHIL # BLD AUTO: 0.44 10*3/MM3 (ref 0–0.7)
EOSINOPHIL NFR BLD AUTO: 4 % (ref 0–5)
ERYTHROCYTE [DISTWIDTH] IN BLOOD BY AUTOMATED COUNT: 17.2 % (ref 11.5–14.5)
GFR SERPL CREATININE-BSD FRML MDRD: >150 ML/MIN/1.73
GLUCOSE BLD-MCNC: 242 MG/DL (ref 70–110)
GLUCOSE BLDC GLUCOMTR-MCNC: 280 MG/DL (ref 70–130)
GLUCOSE BLDC GLUCOMTR-MCNC: 326 MG/DL (ref 70–130)
GLUCOSE BLDC GLUCOMTR-MCNC: 332 MG/DL (ref 70–130)
GLUCOSE BLDC GLUCOMTR-MCNC: 355 MG/DL (ref 70–130)
GLUCOSE BLDC GLUCOMTR-MCNC: 368 MG/DL (ref 70–130)
HCO3 BLDA-SCNC: 22.9 MMOL/L (ref 22–26)
HCT VFR BLD AUTO: 38.5 % (ref 42–52)
HCT VFR BLD CALC: 38 % (ref 42–52)
HGB BLD-MCNC: 12.5 G/DL (ref 14–18)
HGB BLDA-MCNC: 12.9 G/DL (ref 12–16)
HOROWITZ INDEX BLD+IHG-RTO: 30 %
IMM GRANULOCYTES # BLD: 0.48 10*3/MM3 (ref 0–0.03)
IMM GRANULOCYTES NFR BLD: 4.3 % (ref 0–0.5)
LYMPHOCYTES # BLD AUTO: 1.57 10*3/MM3 (ref 1–3)
LYMPHOCYTES NFR BLD AUTO: 14.1 % (ref 21–51)
MAXIMAL PREDICTED HEART RATE: 175 BPM
MCH RBC QN AUTO: 25.6 PG (ref 27–33)
MCHC RBC AUTO-ENTMCNC: 32.5 G/DL (ref 33–37)
MCV RBC AUTO: 78.7 FL (ref 80–94)
METHGB BLD QL: 0.3 % (ref 0–3)
MODALITY: ABNORMAL
MONOCYTES # BLD AUTO: 1.03 10*3/MM3 (ref 0.1–0.9)
MONOCYTES NFR BLD AUTO: 9.3 % (ref 0–10)
NEUTROPHILS # BLD AUTO: 7.49 10*3/MM3 (ref 1.4–6.5)
NEUTROPHILS NFR BLD AUTO: 67.5 % (ref 30–70)
OSMOLALITY SERPL CALC.SUM OF ELEC: 272.6 MOSM/KG (ref 273–305)
OXYHGB MFR BLDV: 94.2 % (ref 85–100)
PCO2 BLDA: 34.2 MM HG (ref 35–45)
PEEP RESPIRATORY: 5 CM[H2O]
PH BLDA: 7.44 PH UNITS (ref 7.35–7.45)
PLATELET # BLD AUTO: 163 10*3/MM3 (ref 130–400)
PMV BLD AUTO: 11.3 FL (ref 6–10)
PO2 BLDA: 76.5 MM HG (ref 80–100)
POTASSIUM BLD-SCNC: 4.6 MMOL/L (ref 3.5–5.3)
RBC # BLD AUTO: 4.89 10*6/MM3 (ref 4.7–6.1)
SAO2 % BLDCOA: 95.6 % (ref 90–100)
SET MECH RESP RATE: 16
SODIUM BLD-SCNC: 132 MMOL/L (ref 135–153)
STRESS TARGET HR: 149 BPM
VENTILATOR MODE: AC
VT ON VENT VENT: 450 ML
WBC NRBC COR # BLD: 11.1 10*3/MM3 (ref 4.5–12.5)

## 2018-04-17 PROCEDURE — 94799 UNLISTED PULMONARY SVC/PX: CPT

## 2018-04-17 PROCEDURE — 93306 TTE W/DOPPLER COMPLETE: CPT | Performed by: INTERNAL MEDICINE

## 2018-04-17 PROCEDURE — 63710000001 INSULIN DETEMIR PER 5 UNITS: Performed by: INTERNAL MEDICINE

## 2018-04-17 PROCEDURE — 25010000002 CEFEPIME: Performed by: INTERNAL MEDICINE

## 2018-04-17 PROCEDURE — 99233 SBSQ HOSP IP/OBS HIGH 50: CPT | Performed by: INTERNAL MEDICINE

## 2018-04-17 PROCEDURE — 80048 BASIC METABOLIC PNL TOTAL CA: CPT | Performed by: INTERNAL MEDICINE

## 2018-04-17 PROCEDURE — 25010000002 METHYLPREDNISOLONE PER 40 MG: Performed by: INTERNAL MEDICINE

## 2018-04-17 PROCEDURE — 71045 X-RAY EXAM CHEST 1 VIEW: CPT | Performed by: RADIOLOGY

## 2018-04-17 PROCEDURE — 99291 CRITICAL CARE FIRST HOUR: CPT | Performed by: INTERNAL MEDICINE

## 2018-04-17 PROCEDURE — 25010000002 HEPARIN (PORCINE) PER 1000 UNITS: Performed by: INTERNAL MEDICINE

## 2018-04-17 PROCEDURE — 93306 TTE W/DOPPLER COMPLETE: CPT

## 2018-04-17 PROCEDURE — 71045 X-RAY EXAM CHEST 1 VIEW: CPT

## 2018-04-17 PROCEDURE — 82375 ASSAY CARBOXYHB QUANT: CPT | Performed by: INTERNAL MEDICINE

## 2018-04-17 PROCEDURE — 25010000002 HEPARIN FLUSH (PORCINE) 100 UNIT/ML SOLUTION: Performed by: INTERNAL MEDICINE

## 2018-04-17 PROCEDURE — 86140 C-REACTIVE PROTEIN: CPT | Performed by: INTERNAL MEDICINE

## 2018-04-17 PROCEDURE — 63710000001 INSULIN ASPART PER 5 UNITS: Performed by: INTERNAL MEDICINE

## 2018-04-17 PROCEDURE — 25010000002 VANCOMYCIN PER 500 MG: Performed by: INTERNAL MEDICINE

## 2018-04-17 PROCEDURE — 25010000002 FUROSEMIDE PER 20 MG: Performed by: INTERNAL MEDICINE

## 2018-04-17 PROCEDURE — 94003 VENT MGMT INPAT SUBQ DAY: CPT

## 2018-04-17 PROCEDURE — 83050 HGB METHEMOGLOBIN QUAN: CPT | Performed by: INTERNAL MEDICINE

## 2018-04-17 PROCEDURE — 36600 WITHDRAWAL OF ARTERIAL BLOOD: CPT | Performed by: INTERNAL MEDICINE

## 2018-04-17 PROCEDURE — 25010000002 LINEZOLID 600 MG/300ML SOLUTION: Performed by: INTERNAL MEDICINE

## 2018-04-17 PROCEDURE — 82805 BLOOD GASES W/O2 SATURATION: CPT | Performed by: INTERNAL MEDICINE

## 2018-04-17 PROCEDURE — 82962 GLUCOSE BLOOD TEST: CPT

## 2018-04-17 PROCEDURE — 85025 COMPLETE CBC W/AUTO DIFF WBC: CPT | Performed by: INTERNAL MEDICINE

## 2018-04-17 RX ORDER — METHYLPREDNISOLONE SODIUM SUCCINATE 40 MG/ML
20 INJECTION, POWDER, LYOPHILIZED, FOR SOLUTION INTRAMUSCULAR; INTRAVENOUS EVERY 12 HOURS
Status: DISCONTINUED | OUTPATIENT
Start: 2018-04-17 | End: 2018-04-18

## 2018-04-17 RX ORDER — LINEZOLID 2 MG/ML
600 INJECTION, SOLUTION INTRAVENOUS EVERY 12 HOURS
Status: COMPLETED | OUTPATIENT
Start: 2018-04-17 | End: 2018-04-28

## 2018-04-17 RX ORDER — FUROSEMIDE 10 MG/ML
40 INJECTION INTRAMUSCULAR; INTRAVENOUS ONCE
Status: COMPLETED | OUTPATIENT
Start: 2018-04-17 | End: 2018-04-17

## 2018-04-17 RX ADMIN — LEVOTHYROXINE SODIUM 75 MCG: 75 TABLET ORAL at 08:44

## 2018-04-17 RX ADMIN — IPRATROPIUM BROMIDE AND ALBUTEROL SULFATE 3 ML: .5; 3 SOLUTION RESPIRATORY (INHALATION) at 18:47

## 2018-04-17 RX ADMIN — METHYLPREDNISOLONE SODIUM SUCCINATE 20 MG: 40 INJECTION, POWDER, FOR SOLUTION INTRAMUSCULAR; INTRAVENOUS at 18:27

## 2018-04-17 RX ADMIN — SODIUM CHLORIDE, PRESERVATIVE FREE 300 UNITS: 5 INJECTION INTRAVENOUS at 21:44

## 2018-04-17 RX ADMIN — IPRATROPIUM BROMIDE AND ALBUTEROL SULFATE 3 ML: .5; 3 SOLUTION RESPIRATORY (INHALATION) at 00:25

## 2018-04-17 RX ADMIN — METRONIDAZOLE 500 MG: 500 INJECTION, SOLUTION INTRAVENOUS at 02:56

## 2018-04-17 RX ADMIN — DEXMEDETOMIDINE HYDROCHLORIDE 1.3 MCG/KG/HR: 100 INJECTION, SOLUTION INTRAVENOUS at 18:55

## 2018-04-17 RX ADMIN — PANTOPRAZOLE SODIUM 40 MG: 40 INJECTION, POWDER, FOR SOLUTION INTRAVENOUS at 05:58

## 2018-04-17 RX ADMIN — IPRATROPIUM BROMIDE AND ALBUTEROL SULFATE 3 ML: .5; 3 SOLUTION RESPIRATORY (INHALATION) at 12:57

## 2018-04-17 RX ADMIN — ACETAMINOPHEN 650 MG: 325 TABLET ORAL at 02:56

## 2018-04-17 RX ADMIN — SODIUM HYPOCHLORITE: 1.25 SOLUTION TOPICAL at 08:41

## 2018-04-17 RX ADMIN — METRONIDAZOLE 500 MG: 500 INJECTION, SOLUTION INTRAVENOUS at 17:13

## 2018-04-17 RX ADMIN — ACETAMINOPHEN 650 MG: 325 TABLET ORAL at 21:40

## 2018-04-17 RX ADMIN — IPRATROPIUM BROMIDE AND ALBUTEROL SULFATE 3 ML: .5; 3 SOLUTION RESPIRATORY (INHALATION) at 06:44

## 2018-04-17 RX ADMIN — HEPARIN SODIUM 5000 UNITS: 5000 INJECTION, SOLUTION INTRAVENOUS; SUBCUTANEOUS at 21:42

## 2018-04-17 RX ADMIN — DEXMEDETOMIDINE HYDROCHLORIDE 1.5 MCG/KG/HR: 100 INJECTION, SOLUTION INTRAVENOUS at 05:39

## 2018-04-17 RX ADMIN — DEXMEDETOMIDINE HYDROCHLORIDE 1.4 MCG/KG/HR: 100 INJECTION, SOLUTION INTRAVENOUS at 00:27

## 2018-04-17 RX ADMIN — ATORVASTATIN CALCIUM 10 MG: 10 TABLET, FILM COATED ORAL at 21:43

## 2018-04-17 RX ADMIN — FUROSEMIDE 40 MG: 10 INJECTION, SOLUTION INTRAMUSCULAR; INTRAVENOUS at 08:42

## 2018-04-17 RX ADMIN — LEVETIRACETAM 750 MG: 100 SOLUTION ORAL at 21:45

## 2018-04-17 RX ADMIN — CEFEPIME 2 G: 2 INJECTION, POWDER, FOR SOLUTION INTRAVENOUS at 03:47

## 2018-04-17 RX ADMIN — DANTROLENE SODIUM 25 MG: 25 CAPSULE ORAL at 21:42

## 2018-04-17 RX ADMIN — CHLORHEXIDINE GLUCONATE 15 ML: 1.2 RINSE ORAL at 08:44

## 2018-04-17 RX ADMIN — Medication 10 ML: at 21:46

## 2018-04-17 RX ADMIN — LINEZOLID 600 MG: 600 INJECTION, SOLUTION INTRAVENOUS at 21:41

## 2018-04-17 RX ADMIN — CHLORHEXIDINE GLUCONATE 15 ML: 1.2 RINSE ORAL at 21:41

## 2018-04-17 RX ADMIN — HEPARIN SODIUM 5000 UNITS: 5000 INJECTION, SOLUTION INTRAVENOUS; SUBCUTANEOUS at 08:42

## 2018-04-17 RX ADMIN — INSULIN ASPART 6 UNITS: 100 INJECTION, SOLUTION INTRAVENOUS; SUBCUTANEOUS at 08:43

## 2018-04-17 RX ADMIN — BACLOFEN 20 MG: 10 TABLET ORAL at 21:43

## 2018-04-17 RX ADMIN — METRONIDAZOLE 500 MG: 500 INJECTION, SOLUTION INTRAVENOUS at 09:44

## 2018-04-17 RX ADMIN — Medication 1 CAPSULE: at 08:44

## 2018-04-17 RX ADMIN — OXCARBAZEPINE 900 MG: 300 TABLET, FILM COATED ORAL at 08:43

## 2018-04-17 RX ADMIN — Medication 10 ML: at 08:45

## 2018-04-17 RX ADMIN — ACETAMINOPHEN 650 MG: 650 SUPPOSITORY RECTAL at 14:36

## 2018-04-17 RX ADMIN — INSULIN ASPART 5 UNITS: 100 INJECTION, SOLUTION INTRAVENOUS; SUBCUTANEOUS at 17:17

## 2018-04-17 RX ADMIN — IBUPROFEN 600 MG: 600 TABLET ORAL at 13:07

## 2018-04-17 RX ADMIN — LINEZOLID 600 MG: 600 INJECTION, SOLUTION INTRAVENOUS at 09:45

## 2018-04-17 RX ADMIN — CASTOR OIL AND BALSAM, PERU: 788; 87 OINTMENT TOPICAL at 08:41

## 2018-04-17 RX ADMIN — ACETAMINOPHEN 650 MG: 650 SUPPOSITORY RECTAL at 08:43

## 2018-04-17 RX ADMIN — DANTROLENE SODIUM 25 MG: 25 CAPSULE ORAL at 08:44

## 2018-04-17 RX ADMIN — SODIUM HYPOCHLORITE: 1.25 SOLUTION TOPICAL at 21:41

## 2018-04-17 RX ADMIN — VANCOMYCIN HYDROCHLORIDE 1000 MG: 5 INJECTION, POWDER, LYOPHILIZED, FOR SOLUTION INTRAVENOUS at 05:59

## 2018-04-17 RX ADMIN — OXCARBAZEPINE 900 MG: 300 TABLET, FILM COATED ORAL at 21:43

## 2018-04-17 RX ADMIN — IBUPROFEN 600 MG: 600 TABLET ORAL at 05:58

## 2018-04-17 RX ADMIN — BACLOFEN 20 MG: 10 TABLET ORAL at 17:13

## 2018-04-17 RX ADMIN — INSULIN ASPART 4 UNITS: 100 INJECTION, SOLUTION INTRAVENOUS; SUBCUTANEOUS at 11:34

## 2018-04-17 RX ADMIN — CASTOR OIL AND BALSAM, PERU: 788; 87 OINTMENT TOPICAL at 21:41

## 2018-04-17 RX ADMIN — LEVETIRACETAM 750 MG: 100 SOLUTION ORAL at 08:43

## 2018-04-17 RX ADMIN — INSULIN ASPART 6 UNITS: 100 INJECTION, SOLUTION INTRAVENOUS; SUBCUTANEOUS at 21:41

## 2018-04-17 RX ADMIN — INSULIN DETEMIR 7 UNITS: 100 INJECTION, SOLUTION SUBCUTANEOUS at 10:16

## 2018-04-17 RX ADMIN — DEXMEDETOMIDINE HYDROCHLORIDE 0.7 MCG/KG/HR: 100 INJECTION, SOLUTION INTRAVENOUS at 11:34

## 2018-04-18 ENCOUNTER — APPOINTMENT (OUTPATIENT)
Dept: GENERAL RADIOLOGY | Facility: HOSPITAL | Age: 43
End: 2018-04-18

## 2018-04-18 LAB
A-A DO2: 201.2 MMHG (ref 0–300)
A-A DO2: 96.4 MMHG (ref 0–300)
ALBUMIN SERPL-MCNC: 3.8 G/DL (ref 3.5–5)
ALBUMIN/GLOB SERPL: 1.3 G/DL (ref 1.5–2.5)
ALP SERPL-CCNC: 93 U/L (ref 40–129)
ALT SERPL W P-5'-P-CCNC: 49 U/L (ref 10–44)
ANION GAP SERPL CALCULATED.3IONS-SCNC: 8 MMOL/L (ref 3.6–11.2)
ANION GAP SERPL CALCULATED.3IONS-SCNC: 8.1 MMOL/L (ref 3.6–11.2)
ANION GAP SERPL CALCULATED.3IONS-SCNC: 9.6 MMOL/L (ref 3.6–11.2)
ANISOCYTOSIS BLD QL: ABNORMAL
ARTERIAL PATENCY WRIST A: ABNORMAL
ARTERIAL PATENCY WRIST A: POSITIVE
AST SERPL-CCNC: 20 U/L (ref 10–34)
ATMOSPHERIC PRESS: 724 MMHG
ATMOSPHERIC PRESS: 728 MMHG
BACTERIA UR QL AUTO: ABNORMAL /HPF
BASE EXCESS BLDA CALC-SCNC: -0.4 MMOL/L
BASE EXCESS BLDA CALC-SCNC: 1.1 MMOL/L
BDY SITE: ABNORMAL
BDY SITE: ABNORMAL
BILIRUB SERPL-MCNC: 0.2 MG/DL (ref 0.2–1.8)
BILIRUB UR QL STRIP: NEGATIVE
BODY TEMPERATURE: 98.6 C
BODY TEMPERATURE: 98.6 C
BUN BLD-MCNC: 10 MG/DL (ref 7–21)
BUN BLD-MCNC: 10 MG/DL (ref 7–21)
BUN BLD-MCNC: 12 MG/DL (ref 7–21)
BUN/CREAT SERPL: 20.8 (ref 7–25)
BUN/CREAT SERPL: 22.7 (ref 7–25)
BUN/CREAT SERPL: 24 (ref 7–25)
CALCIUM SPEC-SCNC: 9.2 MG/DL (ref 7.7–10)
CALCIUM SPEC-SCNC: 9.6 MG/DL (ref 7.7–10)
CALCIUM SPEC-SCNC: 9.7 MG/DL (ref 7.7–10)
CHLORIDE SERPL-SCNC: 90 MMOL/L (ref 99–112)
CHLORIDE SERPL-SCNC: 91 MMOL/L (ref 99–112)
CHLORIDE SERPL-SCNC: 92 MMOL/L (ref 99–112)
CLARITY UR: CLEAR
CO2 SERPL-SCNC: 21.4 MMOL/L (ref 24.3–31.9)
CO2 SERPL-SCNC: 25 MMOL/L (ref 24.3–31.9)
CO2 SERPL-SCNC: 26.9 MMOL/L (ref 24.3–31.9)
COHGB MFR BLD: 0.8 % (ref 0–5)
COHGB MFR BLD: 1.2 % (ref 0–5)
COLOR UR: YELLOW
CREAT BLD-MCNC: 0.44 MG/DL (ref 0.43–1.29)
CREAT BLD-MCNC: 0.48 MG/DL (ref 0.43–1.29)
CREAT BLD-MCNC: 0.5 MG/DL (ref 0.43–1.29)
CRP SERPL-MCNC: 4.7 MG/DL (ref 0–0.99)
DEPRECATED RDW RBC AUTO: 47.6 FL (ref 37–54)
ERYTHROCYTE [DISTWIDTH] IN BLOOD BY AUTOMATED COUNT: 16.8 % (ref 11.5–14.5)
GFR SERPL CREATININE-BSD FRML MDRD: >150 ML/MIN/1.73
GLOBULIN UR ELPH-MCNC: 2.9 GM/DL
GLUCOSE BLD-MCNC: 278 MG/DL (ref 70–110)
GLUCOSE BLD-MCNC: 313 MG/DL (ref 70–110)
GLUCOSE BLD-MCNC: 318 MG/DL (ref 70–110)
GLUCOSE BLDC GLUCOMTR-MCNC: 260 MG/DL (ref 70–130)
GLUCOSE BLDC GLUCOMTR-MCNC: 264 MG/DL (ref 70–130)
GLUCOSE BLDC GLUCOMTR-MCNC: 278 MG/DL (ref 70–130)
GLUCOSE BLDC GLUCOMTR-MCNC: 284 MG/DL (ref 70–130)
GLUCOSE BLDC GLUCOMTR-MCNC: 330 MG/DL (ref 70–130)
GLUCOSE BLDC GLUCOMTR-MCNC: 348 MG/DL (ref 70–130)
GLUCOSE BLDC GLUCOMTR-MCNC: 351 MG/DL (ref 70–130)
GLUCOSE BLDC GLUCOMTR-MCNC: 359 MG/DL (ref 70–130)
GLUCOSE BLDC GLUCOMTR-MCNC: 370 MG/DL (ref 70–130)
GLUCOSE BLDC GLUCOMTR-MCNC: 383 MG/DL (ref 70–130)
GLUCOSE BLDC GLUCOMTR-MCNC: 403 MG/DL (ref 70–130)
GLUCOSE UR STRIP-MCNC: ABNORMAL MG/DL
HCO3 BLDA-SCNC: 23.4 MMOL/L (ref 22–26)
HCO3 BLDA-SCNC: 24.5 MMOL/L (ref 22–26)
HCT VFR BLD AUTO: 39.6 % (ref 42–52)
HCT VFR BLD CALC: 42 % (ref 42–52)
HCT VFR BLD CALC: 46 % (ref 42–52)
HGB BLD-MCNC: 13.1 G/DL (ref 14–18)
HGB BLDA-MCNC: 14.4 G/DL (ref 12–16)
HGB BLDA-MCNC: 15.7 G/DL (ref 12–16)
HGB UR QL STRIP.AUTO: ABNORMAL
HOROWITZ INDEX BLD+IHG-RTO: 100 %
HOROWITZ INDEX BLD+IHG-RTO: 30 %
HYALINE CASTS UR QL AUTO: ABNORMAL /LPF
KETONES UR QL STRIP: NEGATIVE
LEUKOCYTE ESTERASE UR QL STRIP.AUTO: NEGATIVE
LYMPHOCYTES # BLD MANUAL: 2.12 10*3/MM3 (ref 1–3)
LYMPHOCYTES NFR BLD MANUAL: 16 % (ref 21–51)
LYMPHOCYTES NFR BLD MANUAL: 4 % (ref 0–10)
MAGNESIUM SERPL-MCNC: 1.9 MG/DL (ref 1.7–2.6)
MAGNESIUM SERPL-MCNC: 2 MG/DL (ref 1.7–2.6)
MCH RBC QN AUTO: 25.9 PG (ref 27–33)
MCHC RBC AUTO-ENTMCNC: 33.1 G/DL (ref 33–37)
MCV RBC AUTO: 78.4 FL (ref 80–94)
METAMYELOCYTES NFR BLD MANUAL: 2 % (ref 0–0)
METHGB BLD QL: 0.2 % (ref 0–3)
METHGB BLD QL: 0.2 % (ref 0–3)
MICROCYTES BLD QL: ABNORMAL
MODALITY: ABNORMAL
MODALITY: ABNORMAL
MONOCYTES # BLD AUTO: 0.53 10*3/MM3 (ref 0.1–0.9)
NEUTROPHILS # BLD AUTO: 10.32 10*3/MM3 (ref 1.4–6.5)
NEUTROPHILS NFR BLD MANUAL: 75 % (ref 30–70)
NEUTS BAND NFR BLD MANUAL: 3 % (ref 4–12)
NITRITE UR QL STRIP: NEGATIVE
OSMOLALITY SERPL CALC.SUM OF ELEC: 257.2 MOSM/KG (ref 273–305)
OSMOLALITY SERPL CALC.SUM OF ELEC: 258.7 MOSM/KG (ref 273–305)
OSMOLALITY SERPL CALC.SUM OF ELEC: 265 MOSM/KG (ref 273–305)
OXYHGB MFR BLDV: 92.3 % (ref 85–100)
OXYHGB MFR BLDV: 98.9 % (ref 85–100)
PCO2 BLDA: 35.7 MM HG (ref 35–45)
PCO2 BLDA: 36.1 MM HG (ref 35–45)
PEEP RESPIRATORY: 5 CM[H2O]
PEEP RESPIRATORY: 5 CM[H2O]
PH BLDA: 7.43 PH UNITS (ref 7.35–7.45)
PH BLDA: 7.46 PH UNITS (ref 7.35–7.45)
PH UR STRIP.AUTO: 7.5 [PH] (ref 5–8)
PHOSPHATE SERPL-MCNC: 2.4 MG/DL (ref 2.7–4.5)
PHOSPHATE SERPL-MCNC: 2.4 MG/DL (ref 2.7–4.5)
PLAT MORPH BLD: NORMAL
PLATELET # BLD AUTO: 203 10*3/MM3 (ref 130–400)
PMV BLD AUTO: 10.8 FL (ref 6–10)
PO2 BLDA: 439.7 MM HG (ref 80–100)
PO2 BLDA: 66 MM HG (ref 80–100)
POTASSIUM BLD-SCNC: 4.4 MMOL/L (ref 3.5–5.3)
POTASSIUM BLD-SCNC: 4.8 MMOL/L (ref 3.5–5.3)
POTASSIUM BLD-SCNC: 4.9 MMOL/L (ref 3.5–5.3)
PROT SERPL-MCNC: 6.7 G/DL (ref 6–8)
PROT UR QL STRIP: NEGATIVE
RBC # BLD AUTO: 5.05 10*6/MM3 (ref 4.7–6.1)
RBC # UR: ABNORMAL /HPF
REF LAB TEST METHOD: ABNORMAL
SAO2 % BLDCOA: 93.6 % (ref 90–100)
SAO2 % BLDCOA: 99.9 % (ref 90–100)
SET MECH RESP RATE: 15
SET MECH RESP RATE: 15
SODIUM BLD-SCNC: 122 MMOL/L (ref 135–153)
SODIUM BLD-SCNC: 122 MMOL/L (ref 135–153)
SODIUM BLD-SCNC: 123 MMOL/L (ref 135–153)
SODIUM BLD-SCNC: 125 MMOL/L (ref 135–153)
SODIUM BLD-SCNC: 127 MMOL/L (ref 135–153)
SP GR UR STRIP: 1.02 (ref 1–1.03)
SQUAMOUS #/AREA URNS HPF: ABNORMAL /HPF
UROBILINOGEN UR QL STRIP: ABNORMAL
VENTILATOR MODE: AC
VENTILATOR MODE: AC
VT ON VENT VENT: 400 ML
VT ON VENT VENT: 450 ML
WBC NRBC COR # BLD: 13.23 10*3/MM3 (ref 4.5–12.5)
WBC UR QL AUTO: ABNORMAL /HPF

## 2018-04-18 PROCEDURE — 84295 ASSAY OF SERUM SODIUM: CPT | Performed by: INTERNAL MEDICINE

## 2018-04-18 PROCEDURE — 82962 GLUCOSE BLOOD TEST: CPT

## 2018-04-18 PROCEDURE — 94799 UNLISTED PULMONARY SVC/PX: CPT

## 2018-04-18 PROCEDURE — 36600 WITHDRAWAL OF ARTERIAL BLOOD: CPT | Performed by: INTERNAL MEDICINE

## 2018-04-18 PROCEDURE — 63710000001 PREDNISONE PER 1 MG: Performed by: INTERNAL MEDICINE

## 2018-04-18 PROCEDURE — 82784 ASSAY IGA/IGD/IGG/IGM EACH: CPT | Performed by: INTERNAL MEDICINE

## 2018-04-18 PROCEDURE — 87186 SC STD MICRODIL/AGAR DIL: CPT | Performed by: INTERNAL MEDICINE

## 2018-04-18 PROCEDURE — 25010000002 PROPOFOL 10 MG/ML EMULSION

## 2018-04-18 PROCEDURE — 87077 CULTURE AEROBIC IDENTIFY: CPT | Performed by: INTERNAL MEDICINE

## 2018-04-18 PROCEDURE — 81001 URINALYSIS AUTO W/SCOPE: CPT | Performed by: INTERNAL MEDICINE

## 2018-04-18 PROCEDURE — 99291 CRITICAL CARE FIRST HOUR: CPT | Performed by: INTERNAL MEDICINE

## 2018-04-18 PROCEDURE — 99221 1ST HOSP IP/OBS SF/LOW 40: CPT | Performed by: UROLOGY

## 2018-04-18 PROCEDURE — 83050 HGB METHEMOGLOBIN QUAN: CPT | Performed by: INTERNAL MEDICINE

## 2018-04-18 PROCEDURE — 87040 BLOOD CULTURE FOR BACTERIA: CPT | Performed by: INTERNAL MEDICINE

## 2018-04-18 PROCEDURE — 25010000002 PROPOFOL 1000 MG/ML EMULSION: Performed by: INTERNAL MEDICINE

## 2018-04-18 PROCEDURE — 82375 ASSAY CARBOXYHB QUANT: CPT | Performed by: INTERNAL MEDICINE

## 2018-04-18 PROCEDURE — 84100 ASSAY OF PHOSPHORUS: CPT | Performed by: INTERNAL MEDICINE

## 2018-04-18 PROCEDURE — 82805 BLOOD GASES W/O2 SATURATION: CPT | Performed by: INTERNAL MEDICINE

## 2018-04-18 PROCEDURE — 87147 CULTURE TYPE IMMUNOLOGIC: CPT | Performed by: INTERNAL MEDICINE

## 2018-04-18 PROCEDURE — 85025 COMPLETE CBC W/AUTO DIFF WBC: CPT | Performed by: INTERNAL MEDICINE

## 2018-04-18 PROCEDURE — 86140 C-REACTIVE PROTEIN: CPT | Performed by: INTERNAL MEDICINE

## 2018-04-18 PROCEDURE — 25010000002 LINEZOLID 600 MG/300ML SOLUTION: Performed by: INTERNAL MEDICINE

## 2018-04-18 PROCEDURE — 83735 ASSAY OF MAGNESIUM: CPT | Performed by: INTERNAL MEDICINE

## 2018-04-18 PROCEDURE — 87070 CULTURE OTHR SPECIMN AEROBIC: CPT | Performed by: INTERNAL MEDICINE

## 2018-04-18 PROCEDURE — 85007 BL SMEAR W/DIFF WBC COUNT: CPT | Performed by: INTERNAL MEDICINE

## 2018-04-18 PROCEDURE — 25010000002 HEPARIN (PORCINE) PER 1000 UNITS: Performed by: INTERNAL MEDICINE

## 2018-04-18 PROCEDURE — 25010000002 MIDAZOLAM PER 1 MG

## 2018-04-18 PROCEDURE — 25010000002 METHYLPREDNISOLONE PER 40 MG: Performed by: INTERNAL MEDICINE

## 2018-04-18 PROCEDURE — 25010000002 CEFEPIME: Performed by: INTERNAL MEDICINE

## 2018-04-18 PROCEDURE — 71045 X-RAY EXAM CHEST 1 VIEW: CPT

## 2018-04-18 PROCEDURE — 87205 SMEAR GRAM STAIN: CPT | Performed by: INTERNAL MEDICINE

## 2018-04-18 PROCEDURE — 80053 COMPREHEN METABOLIC PANEL: CPT | Performed by: INTERNAL MEDICINE

## 2018-04-18 PROCEDURE — 71045 X-RAY EXAM CHEST 1 VIEW: CPT | Performed by: RADIOLOGY

## 2018-04-18 PROCEDURE — 25010000002 HEPARIN FLUSH (PORCINE) 100 UNIT/ML SOLUTION: Performed by: INTERNAL MEDICINE

## 2018-04-18 PROCEDURE — 94003 VENT MGMT INPAT SUBQ DAY: CPT

## 2018-04-18 RX ORDER — DEXTROSE MONOHYDRATE 25 G/50ML
25-50 INJECTION, SOLUTION INTRAVENOUS
Status: DISCONTINUED | OUTPATIENT
Start: 2018-04-18 | End: 2018-04-19

## 2018-04-18 RX ORDER — MIDAZOLAM HYDROCHLORIDE 1 MG/ML
2 INJECTION INTRAMUSCULAR; INTRAVENOUS ONCE
Status: COMPLETED | OUTPATIENT
Start: 2018-04-18 | End: 2018-04-18

## 2018-04-18 RX ORDER — PROPOFOL 10 MG/ML
VIAL (ML) INTRAVENOUS
Status: COMPLETED
Start: 2018-04-18 | End: 2018-04-18

## 2018-04-18 RX ORDER — SODIUM CHLORIDE 9 MG/ML
INJECTION, SOLUTION INTRAVENOUS
Status: COMPLETED
Start: 2018-04-18 | End: 2018-04-18

## 2018-04-18 RX ORDER — PREDNISONE 20 MG/1
20 TABLET ORAL
Status: DISCONTINUED | OUTPATIENT
Start: 2018-04-18 | End: 2018-04-24

## 2018-04-18 RX ORDER — ACETYLCYSTEINE 200 MG/ML
3 SOLUTION ORAL; RESPIRATORY (INHALATION)
Status: DISCONTINUED | OUTPATIENT
Start: 2018-04-18 | End: 2018-05-02 | Stop reason: HOSPADM

## 2018-04-18 RX ORDER — SODIUM CHLORIDE 9 MG/ML
100 INJECTION, SOLUTION INTRAVENOUS CONTINUOUS
Status: DISCONTINUED | OUTPATIENT
Start: 2018-04-19 | End: 2018-04-20

## 2018-04-18 RX ORDER — SODIUM CHLORIDE 9 MG/ML
INJECTION, SOLUTION INTRAVENOUS
Status: DISPENSED
Start: 2018-04-18 | End: 2018-04-19

## 2018-04-18 RX ORDER — FENTANYL CITRATE 50 UG/ML
50 INJECTION, SOLUTION INTRAMUSCULAR; INTRAVENOUS
Status: DISPENSED | OUTPATIENT
Start: 2018-04-18 | End: 2018-04-28

## 2018-04-18 RX ORDER — MIDAZOLAM HYDROCHLORIDE 1 MG/ML
INJECTION INTRAMUSCULAR; INTRAVENOUS
Status: COMPLETED
Start: 2018-04-18 | End: 2018-04-18

## 2018-04-18 RX ORDER — KETOROLAC TROMETHAMINE 30 MG/ML
30 INJECTION, SOLUTION INTRAMUSCULAR; INTRAVENOUS EVERY 6 HOURS PRN
Status: DISCONTINUED | OUTPATIENT
Start: 2018-04-18 | End: 2018-04-23

## 2018-04-18 RX ADMIN — SODIUM CHLORIDE 4 UNITS/HR: 9 INJECTION, SOLUTION INTRAVENOUS at 04:09

## 2018-04-18 RX ADMIN — DEXMEDETOMIDINE HYDROCHLORIDE 1.5 MCG/KG/HR: 100 INJECTION, SOLUTION INTRAVENOUS at 23:01

## 2018-04-18 RX ADMIN — SODIUM CHLORIDE 20 UNITS/HR: 9 INJECTION, SOLUTION INTRAVENOUS at 17:13

## 2018-04-18 RX ADMIN — PANTOPRAZOLE SODIUM 40 MG: 40 INJECTION, POWDER, FOR SOLUTION INTRAVENOUS at 05:26

## 2018-04-18 RX ADMIN — SODIUM CHLORIDE 12 UNITS/HR: 9 INJECTION, SOLUTION INTRAVENOUS at 06:53

## 2018-04-18 RX ADMIN — PREDNISONE 20 MG: 20 TABLET ORAL at 09:09

## 2018-04-18 RX ADMIN — ACETAMINOPHEN 650 MG: 325 TABLET ORAL at 13:55

## 2018-04-18 RX ADMIN — MIDAZOLAM HYDROCHLORIDE 2 MG: 1 INJECTION, SOLUTION INTRAMUSCULAR; INTRAVENOUS at 09:09

## 2018-04-18 RX ADMIN — NOREPINEPHRINE BITARTRATE 0.1 MCG/KG/MIN: 1 INJECTION INTRAVENOUS at 22:57

## 2018-04-18 RX ADMIN — IPRATROPIUM BROMIDE AND ALBUTEROL SULFATE 3 ML: .5; 3 SOLUTION RESPIRATORY (INHALATION) at 18:42

## 2018-04-18 RX ADMIN — DANTROLENE SODIUM 25 MG: 25 CAPSULE ORAL at 20:44

## 2018-04-18 RX ADMIN — SODIUM HYPOCHLORITE: 1.25 SOLUTION TOPICAL at 20:44

## 2018-04-18 RX ADMIN — METRONIDAZOLE 500 MG: 500 INJECTION, SOLUTION INTRAVENOUS at 18:20

## 2018-04-18 RX ADMIN — SODIUM CHLORIDE 20 UNITS/HR: 9 INJECTION, SOLUTION INTRAVENOUS at 22:58

## 2018-04-18 RX ADMIN — Medication 10 ML: at 20:46

## 2018-04-18 RX ADMIN — LEVETIRACETAM 750 MG: 100 SOLUTION ORAL at 20:43

## 2018-04-18 RX ADMIN — PROPOFOL 30 MCG/KG/MIN: 10 INJECTION, EMULSION INTRAVENOUS at 09:09

## 2018-04-18 RX ADMIN — METRONIDAZOLE 500 MG: 500 INJECTION, SOLUTION INTRAVENOUS at 09:16

## 2018-04-18 RX ADMIN — DEXMEDETOMIDINE HYDROCHLORIDE 1.5 MCG/KG/HR: 100 INJECTION, SOLUTION INTRAVENOUS at 18:15

## 2018-04-18 RX ADMIN — DEXMEDETOMIDINE HYDROCHLORIDE 1.5 MCG/KG/HR: 100 INJECTION, SOLUTION INTRAVENOUS at 09:54

## 2018-04-18 RX ADMIN — DEXMEDETOMIDINE HYDROCHLORIDE 1.5 MCG/KG/HR: 100 INJECTION, SOLUTION INTRAVENOUS at 04:08

## 2018-04-18 RX ADMIN — IPRATROPIUM BROMIDE AND ALBUTEROL SULFATE 3 ML: .5; 3 SOLUTION RESPIRATORY (INHALATION) at 06:20

## 2018-04-18 RX ADMIN — PROPOFOL 30 MCG/KG/MIN: 10 INJECTION, EMULSION INTRAVENOUS at 13:55

## 2018-04-18 RX ADMIN — CEFEPIME 2 G: 2 INJECTION, POWDER, FOR SOLUTION INTRAVENOUS at 10:55

## 2018-04-18 RX ADMIN — CHLORHEXIDINE GLUCONATE 15 ML: 1.2 RINSE ORAL at 20:44

## 2018-04-18 RX ADMIN — Medication 1 CAPSULE: at 09:07

## 2018-04-18 RX ADMIN — DANTROLENE SODIUM 25 MG: 25 CAPSULE ORAL at 09:07

## 2018-04-18 RX ADMIN — SODIUM CHLORIDE, PRESERVATIVE FREE 300 UNITS: 5 INJECTION INTRAVENOUS at 09:07

## 2018-04-18 RX ADMIN — OXCARBAZEPINE 900 MG: 300 TABLET, FILM COATED ORAL at 09:07

## 2018-04-18 RX ADMIN — LINEZOLID 600 MG: 600 INJECTION, SOLUTION INTRAVENOUS at 09:16

## 2018-04-18 RX ADMIN — SODIUM HYPOCHLORITE: 1.25 SOLUTION TOPICAL at 09:08

## 2018-04-18 RX ADMIN — ACETAMINOPHEN 650 MG: 325 TABLET ORAL at 09:07

## 2018-04-18 RX ADMIN — MIDAZOLAM HYDROCHLORIDE 2 MG: 1 INJECTION INTRAMUSCULAR; INTRAVENOUS at 09:09

## 2018-04-18 RX ADMIN — METHYLPREDNISOLONE SODIUM SUCCINATE 20 MG: 40 INJECTION, POWDER, FOR SOLUTION INTRAMUSCULAR; INTRAVENOUS at 05:26

## 2018-04-18 RX ADMIN — ATORVASTATIN CALCIUM 10 MG: 10 TABLET, FILM COATED ORAL at 20:44

## 2018-04-18 RX ADMIN — LINEZOLID 600 MG: 600 INJECTION, SOLUTION INTRAVENOUS at 20:47

## 2018-04-18 RX ADMIN — ACETAMINOPHEN 650 MG: 325 TABLET ORAL at 01:07

## 2018-04-18 RX ADMIN — DEXMEDETOMIDINE HYDROCHLORIDE 1.5 MCG/KG/HR: 100 INJECTION, SOLUTION INTRAVENOUS at 13:55

## 2018-04-18 RX ADMIN — BACLOFEN 20 MG: 10 TABLET ORAL at 18:15

## 2018-04-18 RX ADMIN — ACETAMINOPHEN 650 MG: 325 TABLET ORAL at 20:43

## 2018-04-18 RX ADMIN — SODIUM CHLORIDE 20 UNITS/HR: 9 INJECTION, SOLUTION INTRAVENOUS at 11:11

## 2018-04-18 RX ADMIN — BACLOFEN 20 MG: 10 TABLET ORAL at 20:44

## 2018-04-18 RX ADMIN — CEFEPIME 2 G: 2 INJECTION, POWDER, FOR SOLUTION INTRAVENOUS at 18:15

## 2018-04-18 RX ADMIN — SODIUM CHLORIDE, PRESERVATIVE FREE 300 UNITS: 5 INJECTION INTRAVENOUS at 20:43

## 2018-04-18 RX ADMIN — HEPARIN SODIUM 5000 UNITS: 5000 INJECTION, SOLUTION INTRAVENOUS; SUBCUTANEOUS at 20:43

## 2018-04-18 RX ADMIN — HEPARIN SODIUM 5000 UNITS: 5000 INJECTION, SOLUTION INTRAVENOUS; SUBCUTANEOUS at 09:07

## 2018-04-18 RX ADMIN — IPRATROPIUM BROMIDE AND ALBUTEROL SULFATE 3 ML: .5; 3 SOLUTION RESPIRATORY (INHALATION) at 12:35

## 2018-04-18 RX ADMIN — LEVETIRACETAM 750 MG: 100 SOLUTION ORAL at 09:07

## 2018-04-18 RX ADMIN — CASTOR OIL AND BALSAM, PERU: 788; 87 OINTMENT TOPICAL at 20:44

## 2018-04-18 RX ADMIN — SODIUM CHLORIDE 500 ML: 9 INJECTION, SOLUTION INTRAVENOUS at 23:00

## 2018-04-18 RX ADMIN — IPRATROPIUM BROMIDE AND ALBUTEROL SULFATE 3 ML: .5; 3 SOLUTION RESPIRATORY (INHALATION) at 00:32

## 2018-04-18 RX ADMIN — ACETYLCYSTEINE 3 ML: 200 SOLUTION ORAL; RESPIRATORY (INHALATION) at 18:43

## 2018-04-18 RX ADMIN — CASTOR OIL AND BALSAM, PERU: 788; 87 OINTMENT TOPICAL at 09:08

## 2018-04-18 RX ADMIN — LEVOTHYROXINE SODIUM 75 MCG: 75 TABLET ORAL at 05:26

## 2018-04-18 RX ADMIN — OXCARBAZEPINE 900 MG: 300 TABLET, FILM COATED ORAL at 20:44

## 2018-04-18 RX ADMIN — CHLORHEXIDINE GLUCONATE 15 ML: 1.2 RINSE ORAL at 09:08

## 2018-04-18 RX ADMIN — Medication 10 ML: at 09:10

## 2018-04-18 RX ADMIN — METRONIDAZOLE 500 MG: 500 INJECTION, SOLUTION INTRAVENOUS at 01:08

## 2018-04-19 ENCOUNTER — APPOINTMENT (OUTPATIENT)
Dept: GENERAL RADIOLOGY | Facility: HOSPITAL | Age: 43
End: 2018-04-19

## 2018-04-19 ENCOUNTER — APPOINTMENT (OUTPATIENT)
Dept: CT IMAGING | Facility: HOSPITAL | Age: 43
End: 2018-04-19

## 2018-04-19 LAB
A-A DO2: 170.6 MMHG (ref 0–300)
ANION GAP SERPL CALCULATED.3IONS-SCNC: 10.7 MMOL/L (ref 3.6–11.2)
ANION GAP SERPL CALCULATED.3IONS-SCNC: 6.2 MMOL/L (ref 3.6–11.2)
ANION GAP SERPL CALCULATED.3IONS-SCNC: 8.3 MMOL/L (ref 3.6–11.2)
ANISOCYTOSIS BLD QL: ABNORMAL
ARTERIAL PATENCY WRIST A: POSITIVE
ATMOSPHERIC PRESS: 724 MMHG
BASE EXCESS BLDA CALC-SCNC: 2.2 MMOL/L
BDY SITE: ABNORMAL
BODY TEMPERATURE: 98.6 C
BUN BLD-MCNC: 11 MG/DL (ref 7–21)
BUN BLD-MCNC: 14 MG/DL (ref 7–21)
BUN BLD-MCNC: 14 MG/DL (ref 7–21)
BUN/CREAT SERPL: 30.6 (ref 7–25)
BUN/CREAT SERPL: 32.6 (ref 7–25)
BUN/CREAT SERPL: 37.8 (ref 7–25)
CALCIUM SPEC-SCNC: 8.3 MG/DL (ref 7.7–10)
CALCIUM SPEC-SCNC: 8.6 MG/DL (ref 7.7–10)
CALCIUM SPEC-SCNC: 8.9 MG/DL (ref 7.7–10)
CHLORIDE SERPL-SCNC: 94 MMOL/L (ref 99–112)
CHLORIDE SERPL-SCNC: 97 MMOL/L (ref 99–112)
CHLORIDE SERPL-SCNC: 98 MMOL/L (ref 99–112)
CO2 SERPL-SCNC: 22.7 MMOL/L (ref 24.3–31.9)
CO2 SERPL-SCNC: 25.3 MMOL/L (ref 24.3–31.9)
CO2 SERPL-SCNC: 25.8 MMOL/L (ref 24.3–31.9)
COHGB MFR BLD: 0.8 % (ref 0–5)
CREAT BLD-MCNC: 0.36 MG/DL (ref 0.43–1.29)
CREAT BLD-MCNC: 0.37 MG/DL (ref 0.43–1.29)
CREAT BLD-MCNC: 0.43 MG/DL (ref 0.43–1.29)
CRP SERPL-MCNC: 1.69 MG/DL (ref 0–0.99)
DEPRECATED RDW RBC AUTO: 48.2 FL (ref 37–54)
ERYTHROCYTE [DISTWIDTH] IN BLOOD BY AUTOMATED COUNT: 17.1 % (ref 11.5–14.5)
GFR SERPL CREATININE-BSD FRML MDRD: >150 ML/MIN/1.73
GLUCOSE BLD-MCNC: 165 MG/DL (ref 70–110)
GLUCOSE BLD-MCNC: 178 MG/DL (ref 70–110)
GLUCOSE BLD-MCNC: 314 MG/DL (ref 70–110)
GLUCOSE BLDC GLUCOMTR-MCNC: 114 MG/DL (ref 70–130)
GLUCOSE BLDC GLUCOMTR-MCNC: 133 MG/DL (ref 70–130)
GLUCOSE BLDC GLUCOMTR-MCNC: 148 MG/DL (ref 70–130)
GLUCOSE BLDC GLUCOMTR-MCNC: 174 MG/DL (ref 70–130)
GLUCOSE BLDC GLUCOMTR-MCNC: 179 MG/DL (ref 70–130)
GLUCOSE BLDC GLUCOMTR-MCNC: 180 MG/DL (ref 70–130)
GLUCOSE BLDC GLUCOMTR-MCNC: 181 MG/DL (ref 70–130)
GLUCOSE BLDC GLUCOMTR-MCNC: 200 MG/DL (ref 70–130)
GLUCOSE BLDC GLUCOMTR-MCNC: 245 MG/DL (ref 70–130)
GLUCOSE BLDC GLUCOMTR-MCNC: 299 MG/DL (ref 70–130)
GLUCOSE BLDC GLUCOMTR-MCNC: 331 MG/DL (ref 70–130)
GLUCOSE BLDC GLUCOMTR-MCNC: 50 MG/DL (ref 70–130)
GLUCOSE BLDC GLUCOMTR-MCNC: 92 MG/DL (ref 70–130)
HCO3 BLDA-SCNC: 26.7 MMOL/L (ref 22–26)
HCT VFR BLD AUTO: 41.5 % (ref 42–52)
HCT VFR BLD CALC: 40 % (ref 42–52)
HGB BLD-MCNC: 13.7 G/DL (ref 14–18)
HGB BLDA-MCNC: 13.7 G/DL (ref 12–16)
HOROWITZ INDEX BLD+IHG-RTO: 50 %
IGA SERPL-MCNC: 38 MG/DL (ref 90–386)
IGG SERPL-MCNC: 793 MG/DL (ref 700–1600)
IGM SERPL-MCNC: 66 MG/DL (ref 20–172)
KOH PREP NAIL: NORMAL
LYMPHOCYTES # BLD MANUAL: 2.89 10*3/MM3 (ref 1–3)
LYMPHOCYTES NFR BLD MANUAL: 24 % (ref 0–10)
LYMPHOCYTES NFR BLD MANUAL: 24 % (ref 21–51)
MAGNESIUM SERPL-MCNC: 1.8 MG/DL (ref 1.7–2.6)
MAGNESIUM SERPL-MCNC: 1.9 MG/DL (ref 1.7–2.6)
MAGNESIUM SERPL-MCNC: 2 MG/DL (ref 1.7–2.6)
MCH RBC QN AUTO: 26.1 PG (ref 27–33)
MCHC RBC AUTO-ENTMCNC: 33 G/DL (ref 33–37)
MCV RBC AUTO: 79 FL (ref 80–94)
METHGB BLD QL: 0.2 % (ref 0–3)
MICROCYTES BLD QL: ABNORMAL
MODALITY: ABNORMAL
MONOCYTES # BLD AUTO: 2.89 10*3/MM3 (ref 0.1–0.9)
NEUTROPHILS # BLD AUTO: 6.27 10*3/MM3 (ref 1.4–6.5)
NEUTROPHILS NFR BLD MANUAL: 47 % (ref 30–70)
NEUTS BAND NFR BLD MANUAL: 5 % (ref 4–12)
OSMOLALITY SERPL CALC.SUM OF ELEC: 260.9 MOSM/KG (ref 273–305)
OSMOLALITY SERPL CALC.SUM OF ELEC: 265 MOSM/KG (ref 273–305)
OSMOLALITY SERPL CALC.SUM OF ELEC: 273.2 MOSM/KG (ref 273–305)
OXYHGB MFR BLDV: 97.6 % (ref 85–100)
PCO2 BLDA: 40.7 MM HG (ref 35–45)
PEEP RESPIRATORY: 5 CM[H2O]
PH BLDA: 7.43 PH UNITS (ref 7.35–7.45)
PHOSPHATE SERPL-MCNC: 2.3 MG/DL (ref 2.7–4.5)
PHOSPHATE SERPL-MCNC: 2.8 MG/DL (ref 2.7–4.5)
PHOSPHATE SERPL-MCNC: 5.2 MG/DL (ref 2.7–4.5)
PLAT MORPH BLD: NORMAL
PLATELET # BLD AUTO: 227 10*3/MM3 (ref 130–400)
PMV BLD AUTO: 10.9 FL (ref 6–10)
PO2 BLDA: 122.1 MM HG (ref 80–100)
POTASSIUM BLD-SCNC: 3.4 MMOL/L (ref 3.5–5.3)
POTASSIUM BLD-SCNC: 3.7 MMOL/L (ref 3.5–5.3)
POTASSIUM BLD-SCNC: 5 MMOL/L (ref 3.5–5.3)
RBC # BLD AUTO: 5.25 10*6/MM3 (ref 4.7–6.1)
SAO2 % BLDCOA: 98.6 % (ref 90–100)
SCAN SLIDE: NORMAL
SET MECH RESP RATE: 15
SODIUM BLD-SCNC: 128 MMOL/L (ref 135–153)
SODIUM BLD-SCNC: 130 MMOL/L (ref 135–153)
SODIUM BLD-SCNC: 130 MMOL/L (ref 135–153)
VENTILATOR MODE: AC
VT ON VENT VENT: 400 ML
WBC NRBC COR # BLD: 12.06 10*3/MM3 (ref 4.5–12.5)

## 2018-04-19 PROCEDURE — 94003 VENT MGMT INPAT SUBQ DAY: CPT

## 2018-04-19 PROCEDURE — 87147 CULTURE TYPE IMMUNOLOGIC: CPT | Performed by: INTERNAL MEDICINE

## 2018-04-19 PROCEDURE — 25010000002 LINEZOLID 600 MG/300ML SOLUTION: Performed by: INTERNAL MEDICINE

## 2018-04-19 PROCEDURE — 63710000001 INSULIN ASPART PER 5 UNITS: Performed by: INTERNAL MEDICINE

## 2018-04-19 PROCEDURE — 94799 UNLISTED PULMONARY SVC/PX: CPT

## 2018-04-19 PROCEDURE — 36600 WITHDRAWAL OF ARTERIAL BLOOD: CPT | Performed by: INTERNAL MEDICINE

## 2018-04-19 PROCEDURE — 0B9F8ZX DRAINAGE OF RIGHT LOWER LUNG LOBE, VIA NATURAL OR ARTIFICIAL OPENING ENDOSCOPIC, DIAGNOSTIC: ICD-10-PCS | Performed by: INTERNAL MEDICINE

## 2018-04-19 PROCEDURE — 71045 X-RAY EXAM CHEST 1 VIEW: CPT | Performed by: RADIOLOGY

## 2018-04-19 PROCEDURE — 85007 BL SMEAR W/DIFF WBC COUNT: CPT | Performed by: INTERNAL MEDICINE

## 2018-04-19 PROCEDURE — 31624 DX BRONCHOSCOPE/LAVAGE: CPT | Performed by: INTERNAL MEDICINE

## 2018-04-19 PROCEDURE — 87077 CULTURE AEROBIC IDENTIFY: CPT | Performed by: INTERNAL MEDICINE

## 2018-04-19 PROCEDURE — 87205 SMEAR GRAM STAIN: CPT | Performed by: INTERNAL MEDICINE

## 2018-04-19 PROCEDURE — 25010000002 HEPARIN FLUSH (PORCINE) 100 UNIT/ML SOLUTION: Performed by: INTERNAL MEDICINE

## 2018-04-19 PROCEDURE — 71045 X-RAY EXAM CHEST 1 VIEW: CPT

## 2018-04-19 PROCEDURE — 25010000002 MIDAZOLAM PER 1 MG

## 2018-04-19 PROCEDURE — 83050 HGB METHEMOGLOBIN QUAN: CPT | Performed by: INTERNAL MEDICINE

## 2018-04-19 PROCEDURE — 87220 TISSUE EXAM FOR FUNGI: CPT | Performed by: INTERNAL MEDICINE

## 2018-04-19 PROCEDURE — 25010000002 HEPARIN (PORCINE) PER 1000 UNITS: Performed by: INTERNAL MEDICINE

## 2018-04-19 PROCEDURE — 85025 COMPLETE CBC W/AUTO DIFF WBC: CPT | Performed by: INTERNAL MEDICINE

## 2018-04-19 PROCEDURE — 87186 SC STD MICRODIL/AGAR DIL: CPT | Performed by: INTERNAL MEDICINE

## 2018-04-19 PROCEDURE — 82805 BLOOD GASES W/O2 SATURATION: CPT | Performed by: INTERNAL MEDICINE

## 2018-04-19 PROCEDURE — 99232 SBSQ HOSP IP/OBS MODERATE 35: CPT | Performed by: UROLOGY

## 2018-04-19 PROCEDURE — 82375 ASSAY CARBOXYHB QUANT: CPT | Performed by: INTERNAL MEDICINE

## 2018-04-19 PROCEDURE — 63710000001 PREDNISONE PER 1 MG: Performed by: INTERNAL MEDICINE

## 2018-04-19 PROCEDURE — 80048 BASIC METABOLIC PNL TOTAL CA: CPT | Performed by: INTERNAL MEDICINE

## 2018-04-19 PROCEDURE — 74178 CT ABD&PLV WO CNTR FLWD CNTR: CPT

## 2018-04-19 PROCEDURE — 82962 GLUCOSE BLOOD TEST: CPT

## 2018-04-19 PROCEDURE — 25010000002 CEFEPIME: Performed by: INTERNAL MEDICINE

## 2018-04-19 PROCEDURE — 31500 INSERT EMERGENCY AIRWAY: CPT | Performed by: INTERNAL MEDICINE

## 2018-04-19 PROCEDURE — 0B9D8ZX DRAINAGE OF RIGHT MIDDLE LUNG LOBE, VIA NATURAL OR ARTIFICIAL OPENING ENDOSCOPIC, DIAGNOSTIC: ICD-10-PCS | Performed by: INTERNAL MEDICINE

## 2018-04-19 PROCEDURE — 83735 ASSAY OF MAGNESIUM: CPT | Performed by: INTERNAL MEDICINE

## 2018-04-19 PROCEDURE — 74178 CT ABD&PLV WO CNTR FLWD CNTR: CPT | Performed by: RADIOLOGY

## 2018-04-19 PROCEDURE — 86140 C-REACTIVE PROTEIN: CPT | Performed by: INTERNAL MEDICINE

## 2018-04-19 PROCEDURE — 99291 CRITICAL CARE FIRST HOUR: CPT | Performed by: INTERNAL MEDICINE

## 2018-04-19 PROCEDURE — 84100 ASSAY OF PHOSPHORUS: CPT | Performed by: INTERNAL MEDICINE

## 2018-04-19 PROCEDURE — 0B9J8ZX DRAINAGE OF LEFT LOWER LUNG LOBE, VIA NATURAL OR ARTIFICIAL OPENING ENDOSCOPIC, DIAGNOSTIC: ICD-10-PCS | Performed by: INTERNAL MEDICINE

## 2018-04-19 PROCEDURE — 87070 CULTURE OTHR SPECIMN AEROBIC: CPT | Performed by: INTERNAL MEDICINE

## 2018-04-19 PROCEDURE — 25010000002 IOPAMIDOL 61 % SOLUTION: Performed by: INTERNAL MEDICINE

## 2018-04-19 PROCEDURE — 0B9H8ZX DRAINAGE OF LUNG LINGULA, VIA NATURAL OR ARTIFICIAL OPENING ENDOSCOPIC, DIAGNOSTIC: ICD-10-PCS | Performed by: INTERNAL MEDICINE

## 2018-04-19 RX ORDER — MAGNESIUM HYDROXIDE 1200 MG/15ML
LIQUID ORAL
Status: DISPENSED
Start: 2018-04-19 | End: 2018-04-19

## 2018-04-19 RX ORDER — MIDAZOLAM HYDROCHLORIDE 1 MG/ML
INJECTION INTRAMUSCULAR; INTRAVENOUS
Status: COMPLETED
Start: 2018-04-19 | End: 2018-04-19

## 2018-04-19 RX ORDER — DEXTROSE MONOHYDRATE 25 G/50ML
25 INJECTION, SOLUTION INTRAVENOUS
Status: DISCONTINUED | OUTPATIENT
Start: 2018-04-19 | End: 2018-05-02 | Stop reason: HOSPADM

## 2018-04-19 RX ORDER — VECURONIUM BROMIDE 1 MG/ML
INJECTION, POWDER, LYOPHILIZED, FOR SOLUTION INTRAVENOUS
Status: DISPENSED
Start: 2018-04-19 | End: 2018-04-19

## 2018-04-19 RX ORDER — VECURONIUM BROMIDE 1 MG/ML
5 INJECTION, POWDER, LYOPHILIZED, FOR SOLUTION INTRAVENOUS ONCE
Status: COMPLETED | OUTPATIENT
Start: 2018-04-19 | End: 2018-04-19

## 2018-04-19 RX ORDER — NICOTINE POLACRILEX 4 MG
15 LOZENGE BUCCAL
Status: DISCONTINUED | OUTPATIENT
Start: 2018-04-19 | End: 2018-05-02 | Stop reason: HOSPADM

## 2018-04-19 RX ORDER — MIDAZOLAM HYDROCHLORIDE 1 MG/ML
1 INJECTION INTRAMUSCULAR; INTRAVENOUS ONCE
Status: COMPLETED | OUTPATIENT
Start: 2018-04-19 | End: 2018-04-19

## 2018-04-19 RX ADMIN — IPRATROPIUM BROMIDE AND ALBUTEROL SULFATE 3 ML: .5; 3 SOLUTION RESPIRATORY (INHALATION) at 12:51

## 2018-04-19 RX ADMIN — IPRATROPIUM BROMIDE AND ALBUTEROL SULFATE 3 ML: .5; 3 SOLUTION RESPIRATORY (INHALATION) at 00:14

## 2018-04-19 RX ADMIN — DEXMEDETOMIDINE HYDROCHLORIDE 1.5 MCG/KG/HR: 100 INJECTION, SOLUTION INTRAVENOUS at 02:28

## 2018-04-19 RX ADMIN — ACETAMINOPHEN 650 MG: 325 TABLET ORAL at 15:39

## 2018-04-19 RX ADMIN — MIDAZOLAM HYDROCHLORIDE 1 MG: 1 INJECTION INTRAMUSCULAR; INTRAVENOUS at 09:38

## 2018-04-19 RX ADMIN — CEFEPIME 2 G: 2 INJECTION, POWDER, FOR SOLUTION INTRAVENOUS at 10:02

## 2018-04-19 RX ADMIN — SODIUM HYPOCHLORITE: 1.25 SOLUTION TOPICAL at 08:37

## 2018-04-19 RX ADMIN — DANTROLENE SODIUM 25 MG: 25 CAPSULE ORAL at 08:37

## 2018-04-19 RX ADMIN — DEXMEDETOMIDINE HYDROCHLORIDE 1.5 MCG/KG/HR: 100 INJECTION, SOLUTION INTRAVENOUS at 15:00

## 2018-04-19 RX ADMIN — IPRATROPIUM BROMIDE AND ALBUTEROL SULFATE 3 ML: .5; 3 SOLUTION RESPIRATORY (INHALATION) at 06:55

## 2018-04-19 RX ADMIN — HEPARIN SODIUM 5000 UNITS: 5000 INJECTION, SOLUTION INTRAVENOUS; SUBCUTANEOUS at 22:02

## 2018-04-19 RX ADMIN — PANTOPRAZOLE SODIUM 40 MG: 40 INJECTION, POWDER, FOR SOLUTION INTRAVENOUS at 04:56

## 2018-04-19 RX ADMIN — SODIUM HYPOCHLORITE: 1.25 SOLUTION TOPICAL at 22:03

## 2018-04-19 RX ADMIN — CHLORHEXIDINE GLUCONATE 15 ML: 1.2 RINSE ORAL at 22:03

## 2018-04-19 RX ADMIN — ATORVASTATIN CALCIUM 10 MG: 10 TABLET, FILM COATED ORAL at 22:02

## 2018-04-19 RX ADMIN — IPRATROPIUM BROMIDE AND ALBUTEROL SULFATE 3 ML: .5; 3 SOLUTION RESPIRATORY (INHALATION) at 18:59

## 2018-04-19 RX ADMIN — LINEZOLID 600 MG: 600 INJECTION, SOLUTION INTRAVENOUS at 12:33

## 2018-04-19 RX ADMIN — CEFEPIME 2 G: 2 INJECTION, POWDER, FOR SOLUTION INTRAVENOUS at 02:27

## 2018-04-19 RX ADMIN — LINEZOLID 600 MG: 600 INJECTION, SOLUTION INTRAVENOUS at 21:59

## 2018-04-19 RX ADMIN — CEFEPIME 2 G: 2 INJECTION, POWDER, FOR SOLUTION INTRAVENOUS at 18:09

## 2018-04-19 RX ADMIN — METRONIDAZOLE 500 MG: 500 INJECTION, SOLUTION INTRAVENOUS at 02:28

## 2018-04-19 RX ADMIN — ACETYLCYSTEINE 3 ML: 200 SOLUTION ORAL; RESPIRATORY (INHALATION) at 06:56

## 2018-04-19 RX ADMIN — Medication 1 CAPSULE: at 08:37

## 2018-04-19 RX ADMIN — DEXMEDETOMIDINE HYDROCHLORIDE 1.5 MCG/KG/HR: 100 INJECTION, SOLUTION INTRAVENOUS at 19:16

## 2018-04-19 RX ADMIN — VECURONIUM BROMIDE 5 MG: 1 INJECTION, POWDER, LYOPHILIZED, FOR SOLUTION INTRAVENOUS at 09:40

## 2018-04-19 RX ADMIN — SODIUM CHLORIDE, POTASSIUM CHLORIDE, SODIUM LACTATE AND CALCIUM CHLORIDE 1000 ML: 600; 310; 30; 20 INJECTION, SOLUTION INTRAVENOUS at 07:30

## 2018-04-19 RX ADMIN — DEXMEDETOMIDINE HYDROCHLORIDE 1.5 MCG/KG/HR: 100 INJECTION, SOLUTION INTRAVENOUS at 12:48

## 2018-04-19 RX ADMIN — CASTOR OIL AND BALSAM, PERU: 788; 87 OINTMENT TOPICAL at 22:03

## 2018-04-19 RX ADMIN — SODIUM CHLORIDE, PRESERVATIVE FREE 300 UNITS: 5 INJECTION INTRAVENOUS at 08:36

## 2018-04-19 RX ADMIN — ACETYLCYSTEINE 3 ML: 200 SOLUTION ORAL; RESPIRATORY (INHALATION) at 18:59

## 2018-04-19 RX ADMIN — BACLOFEN 20 MG: 10 TABLET ORAL at 18:09

## 2018-04-19 RX ADMIN — CASTOR OIL AND BALSAM, PERU: 788; 87 OINTMENT TOPICAL at 08:37

## 2018-04-19 RX ADMIN — HEPARIN SODIUM 5000 UNITS: 5000 INJECTION, SOLUTION INTRAVENOUS; SUBCUTANEOUS at 08:36

## 2018-04-19 RX ADMIN — DEXTROSE MONOHYDRATE 25 ML: 25 INJECTION, SOLUTION INTRAVENOUS at 10:15

## 2018-04-19 RX ADMIN — INSULIN ASPART 6 UNITS: 100 INJECTION, SOLUTION INTRAVENOUS; SUBCUTANEOUS at 21:59

## 2018-04-19 RX ADMIN — IOPAMIDOL 100 ML: 612 INJECTION, SOLUTION INTRAVENOUS at 16:00

## 2018-04-19 RX ADMIN — CHLORHEXIDINE GLUCONATE 15 ML: 1.2 RINSE ORAL at 08:37

## 2018-04-19 RX ADMIN — SODIUM CHLORIDE 100 ML/HR: 9 INJECTION, SOLUTION INTRAVENOUS at 22:00

## 2018-04-19 RX ADMIN — SODIUM CHLORIDE 20 UNITS/HR: 9 INJECTION, SOLUTION INTRAVENOUS at 09:42

## 2018-04-19 RX ADMIN — METRONIDAZOLE 500 MG: 500 INJECTION, SOLUTION INTRAVENOUS at 10:03

## 2018-04-19 RX ADMIN — SODIUM CHLORIDE, PRESERVATIVE FREE 300 UNITS: 5 INJECTION INTRAVENOUS at 22:00

## 2018-04-19 RX ADMIN — SODIUM CHLORIDE 1000 ML: 9 INJECTION, SOLUTION INTRAVENOUS at 07:30

## 2018-04-19 RX ADMIN — LEVETIRACETAM 750 MG: 100 SOLUTION ORAL at 22:01

## 2018-04-19 RX ADMIN — LEVOTHYROXINE SODIUM 75 MCG: 75 TABLET ORAL at 04:56

## 2018-04-19 RX ADMIN — ACETAMINOPHEN 650 MG: 325 TABLET ORAL at 08:37

## 2018-04-19 RX ADMIN — POTASSIUM PHOSPHATE, MONOBASIC AND POTASSIUM PHOSPHATE, DIBASIC 20 MMOL: 224; 236 INJECTION, SOLUTION INTRAVENOUS at 08:47

## 2018-04-19 RX ADMIN — OXCARBAZEPINE 900 MG: 300 TABLET, FILM COATED ORAL at 08:37

## 2018-04-19 RX ADMIN — SODIUM CHLORIDE 100 ML/HR: 9 INJECTION, SOLUTION INTRAVENOUS at 02:28

## 2018-04-19 RX ADMIN — ACETAMINOPHEN 650 MG: 325 TABLET ORAL at 02:28

## 2018-04-19 RX ADMIN — LEVETIRACETAM 750 MG: 100 SOLUTION ORAL at 08:37

## 2018-04-19 RX ADMIN — PREDNISONE 20 MG: 20 TABLET ORAL at 10:03

## 2018-04-19 RX ADMIN — METRONIDAZOLE 500 MG: 500 INJECTION, SOLUTION INTRAVENOUS at 18:09

## 2018-04-19 RX ADMIN — Medication 10 ML: at 08:38

## 2018-04-19 RX ADMIN — Medication 10 ML: at 22:03

## 2018-04-19 RX ADMIN — ACETAMINOPHEN 650 MG: 325 TABLET ORAL at 22:00

## 2018-04-19 RX ADMIN — DANTROLENE SODIUM 25 MG: 25 CAPSULE ORAL at 22:00

## 2018-04-19 RX ADMIN — BACLOFEN 20 MG: 10 TABLET ORAL at 22:02

## 2018-04-19 RX ADMIN — OXCARBAZEPINE 900 MG: 300 TABLET, FILM COATED ORAL at 22:03

## 2018-04-19 RX ADMIN — MIDAZOLAM HYDROCHLORIDE 1 MG: 1 INJECTION, SOLUTION INTRAMUSCULAR; INTRAVENOUS at 09:38

## 2018-04-20 LAB
A-A DO2: 125 MMHG (ref 0–300)
ANION GAP SERPL CALCULATED.3IONS-SCNC: 3.6 MMOL/L (ref 3.6–11.2)
ANISOCYTOSIS BLD QL: ABNORMAL
ARTERIAL PATENCY WRIST A: ABNORMAL
ATMOSPHERIC PRESS: 726 MMHG
BACTERIA SPEC AEROBE CULT: NORMAL
BACTERIA SPEC AEROBE CULT: NORMAL
BASE EXCESS BLDA CALC-SCNC: 1.1 MMOL/L
BASOPHILS # BLD MANUAL: 0.11 10*3/MM3 (ref 0–0.3)
BASOPHILS NFR BLD AUTO: 1 % (ref 0–2)
BDY SITE: ABNORMAL
BODY TEMPERATURE: 98.5 C
BUN BLD-MCNC: 10 MG/DL (ref 7–21)
BUN/CREAT SERPL: 27 (ref 7–25)
CALCIUM SPEC-SCNC: 8.5 MG/DL (ref 7.7–10)
CHLORIDE SERPL-SCNC: 97 MMOL/L (ref 99–112)
CO2 SERPL-SCNC: 27.4 MMOL/L (ref 24.3–31.9)
COHGB MFR BLD: 0.9 % (ref 0–5)
CREAT BLD-MCNC: 0.37 MG/DL (ref 0.43–1.29)
DEPRECATED RDW RBC AUTO: 50 FL (ref 37–54)
EOSINOPHIL # BLD MANUAL: 0.66 10*3/MM3 (ref 0–0.7)
EOSINOPHIL NFR BLD MANUAL: 6 % (ref 0–5)
ERYTHROCYTE [DISTWIDTH] IN BLOOD BY AUTOMATED COUNT: 17.2 % (ref 11.5–14.5)
GFR SERPL CREATININE-BSD FRML MDRD: >150 ML/MIN/1.73
GLUCOSE BLD-MCNC: 255 MG/DL (ref 70–110)
GLUCOSE BLDC GLUCOMTR-MCNC: 139 MG/DL (ref 70–130)
GLUCOSE BLDC GLUCOMTR-MCNC: 202 MG/DL (ref 70–130)
GLUCOSE BLDC GLUCOMTR-MCNC: 252 MG/DL (ref 70–130)
GLUCOSE BLDC GLUCOMTR-MCNC: 266 MG/DL (ref 70–130)
GLUCOSE BLDC GLUCOMTR-MCNC: 276 MG/DL (ref 70–130)
HCO3 BLDA-SCNC: 25.6 MMOL/L (ref 22–26)
HCT VFR BLD AUTO: 37.4 % (ref 42–52)
HCT VFR BLD CALC: 35 % (ref 42–52)
HGB BLD-MCNC: 11.9 G/DL (ref 14–18)
HGB BLDA-MCNC: 12 G/DL (ref 12–16)
HOROWITZ INDEX BLD+IHG-RTO: 40 %
HYPOCHROMIA BLD QL: ABNORMAL
LYMPHOCYTES # BLD MANUAL: 1.44 10*3/MM3 (ref 1–3)
LYMPHOCYTES NFR BLD MANUAL: 13 % (ref 21–51)
LYMPHOCYTES NFR BLD MANUAL: 4 % (ref 0–10)
MAGNESIUM SERPL-MCNC: 1.8 MG/DL (ref 1.7–2.6)
MAGNESIUM SERPL-MCNC: 2 MG/DL (ref 1.7–2.6)
MAGNESIUM SERPL-MCNC: 2 MG/DL (ref 1.7–2.6)
MCH RBC QN AUTO: 25.7 PG (ref 27–33)
MCHC RBC AUTO-ENTMCNC: 31.8 G/DL (ref 33–37)
MCV RBC AUTO: 80.8 FL (ref 80–94)
METAMYELOCYTES NFR BLD MANUAL: 1 % (ref 0–0)
METHGB BLD QL: 0.2 % (ref 0–3)
MODALITY: ABNORMAL
MONOCYTES # BLD AUTO: 0.44 10*3/MM3 (ref 0.1–0.9)
NEUTROPHILS # BLD AUTO: 8.31 10*3/MM3 (ref 1.4–6.5)
NEUTROPHILS NFR BLD MANUAL: 75 % (ref 30–70)
OSMOLALITY SERPL CALC.SUM OF ELEC: 264.8 MOSM/KG (ref 273–305)
OXYHGB MFR BLDV: 96.9 % (ref 85–100)
PCO2 BLDA: 40.4 MM HG (ref 35–45)
PH BLDA: 7.42 PH UNITS (ref 7.35–7.45)
PHOSPHATE SERPL-MCNC: 3.3 MG/DL (ref 2.7–4.5)
PHOSPHATE SERPL-MCNC: 3.7 MG/DL (ref 2.7–4.5)
PHOSPHATE SERPL-MCNC: 3.9 MG/DL (ref 2.7–4.5)
PLAT MORPH BLD: NORMAL
PLATELET # BLD AUTO: 194 10*3/MM3 (ref 130–400)
PMV BLD AUTO: 10.8 FL (ref 6–10)
PO2 BLDA: 101 MM HG (ref 80–100)
POTASSIUM BLD-SCNC: 4 MMOL/L (ref 3.5–5.3)
RBC # BLD AUTO: 4.63 10*6/MM3 (ref 4.7–6.1)
SAO2 % BLDCOA: 98 % (ref 90–100)
SCAN SLIDE: NORMAL
SET MECH RESP RATE: 14
SODIUM BLD-SCNC: 128 MMOL/L (ref 135–153)
VENTILATOR MODE: ABNORMAL
VT ON VENT VENT: 400 ML
WBC NRBC COR # BLD: 11.08 10*3/MM3 (ref 4.5–12.5)

## 2018-04-20 PROCEDURE — 85025 COMPLETE CBC W/AUTO DIFF WBC: CPT | Performed by: INTERNAL MEDICINE

## 2018-04-20 PROCEDURE — 25010000002 HEPARIN FLUSH (PORCINE) 100 UNIT/ML SOLUTION: Performed by: INTERNAL MEDICINE

## 2018-04-20 PROCEDURE — 25010000002 LINEZOLID 600 MG/300ML SOLUTION: Performed by: INTERNAL MEDICINE

## 2018-04-20 PROCEDURE — 36600 WITHDRAWAL OF ARTERIAL BLOOD: CPT | Performed by: INTERNAL MEDICINE

## 2018-04-20 PROCEDURE — 84100 ASSAY OF PHOSPHORUS: CPT | Performed by: INTERNAL MEDICINE

## 2018-04-20 PROCEDURE — 63710000001 PREDNISONE PER 1 MG: Performed by: INTERNAL MEDICINE

## 2018-04-20 PROCEDURE — 82962 GLUCOSE BLOOD TEST: CPT

## 2018-04-20 PROCEDURE — 82375 ASSAY CARBOXYHB QUANT: CPT | Performed by: INTERNAL MEDICINE

## 2018-04-20 PROCEDURE — 82805 BLOOD GASES W/O2 SATURATION: CPT | Performed by: INTERNAL MEDICINE

## 2018-04-20 PROCEDURE — 25010000002 HEPARIN (PORCINE) PER 1000 UNITS: Performed by: INTERNAL MEDICINE

## 2018-04-20 PROCEDURE — 94003 VENT MGMT INPAT SUBQ DAY: CPT

## 2018-04-20 PROCEDURE — 94799 UNLISTED PULMONARY SVC/PX: CPT

## 2018-04-20 PROCEDURE — 83735 ASSAY OF MAGNESIUM: CPT | Performed by: INTERNAL MEDICINE

## 2018-04-20 PROCEDURE — 99291 CRITICAL CARE FIRST HOUR: CPT | Performed by: INTERNAL MEDICINE

## 2018-04-20 PROCEDURE — 83050 HGB METHEMOGLOBIN QUAN: CPT | Performed by: INTERNAL MEDICINE

## 2018-04-20 PROCEDURE — 80048 BASIC METABOLIC PNL TOTAL CA: CPT | Performed by: INTERNAL MEDICINE

## 2018-04-20 PROCEDURE — 25010000002 CEFEPIME: Performed by: INTERNAL MEDICINE

## 2018-04-20 PROCEDURE — 85007 BL SMEAR W/DIFF WBC COUNT: CPT | Performed by: INTERNAL MEDICINE

## 2018-04-20 PROCEDURE — 63710000001 INSULIN ASPART PER 5 UNITS: Performed by: INTERNAL MEDICINE

## 2018-04-20 RX ORDER — BISACODYL 10 MG
10 SUPPOSITORY, RECTAL RECTAL DAILY PRN
Status: DISCONTINUED | OUTPATIENT
Start: 2018-04-20 | End: 2018-05-02 | Stop reason: HOSPADM

## 2018-04-20 RX ORDER — ACETAMINOPHEN 325 MG/1
650 TABLET ORAL EVERY 6 HOURS
Status: DISCONTINUED | OUTPATIENT
Start: 2018-04-21 | End: 2018-04-21

## 2018-04-20 RX ORDER — FINASTERIDE 5 MG/1
5 TABLET, FILM COATED ORAL DAILY
Qty: 90 TABLET | Refills: 3 | Status: SHIPPED | OUTPATIENT
Start: 2018-04-20

## 2018-04-20 RX ORDER — ACETAMINOPHEN 650 MG/1
650 SUPPOSITORY RECTAL EVERY 6 HOURS
Status: DISCONTINUED | OUTPATIENT
Start: 2018-04-21 | End: 2018-04-21

## 2018-04-20 RX ORDER — ACETAMINOPHEN 160 MG/5ML
SOLUTION ORAL
Status: COMPLETED
Start: 2018-04-20 | End: 2018-04-20

## 2018-04-20 RX ORDER — FINASTERIDE 5 MG/1
5 TABLET, FILM COATED ORAL DAILY
Status: DISCONTINUED | OUTPATIENT
Start: 2018-04-20 | End: 2018-05-02 | Stop reason: HOSPADM

## 2018-04-20 RX ADMIN — HEPARIN SODIUM 5000 UNITS: 5000 INJECTION, SOLUTION INTRAVENOUS; SUBCUTANEOUS at 09:02

## 2018-04-20 RX ADMIN — ACETAMINOPHEN 650 MG: 325 TABLET ORAL at 02:26

## 2018-04-20 RX ADMIN — CASTOR OIL AND BALSAM, PERU: 788; 87 OINTMENT TOPICAL at 20:51

## 2018-04-20 RX ADMIN — POLYETHYLENE GLYCOL (3350) 17 G: 17 POWDER, FOR SOLUTION ORAL at 17:09

## 2018-04-20 RX ADMIN — CHLORHEXIDINE GLUCONATE 15 ML: 1.2 RINSE ORAL at 09:02

## 2018-04-20 RX ADMIN — LEVOTHYROXINE SODIUM 75 MCG: 75 TABLET ORAL at 05:54

## 2018-04-20 RX ADMIN — SODIUM HYPOCHLORITE: 1.25 SOLUTION TOPICAL at 20:51

## 2018-04-20 RX ADMIN — IPRATROPIUM BROMIDE AND ALBUTEROL SULFATE 3 ML: .5; 3 SOLUTION RESPIRATORY (INHALATION) at 19:25

## 2018-04-20 RX ADMIN — METRONIDAZOLE 500 MG: 500 INJECTION, SOLUTION INTRAVENOUS at 09:40

## 2018-04-20 RX ADMIN — LINEZOLID 600 MG: 600 INJECTION, SOLUTION INTRAVENOUS at 21:07

## 2018-04-20 RX ADMIN — SODIUM HYPOCHLORITE: 1.25 SOLUTION TOPICAL at 13:57

## 2018-04-20 RX ADMIN — DEXMEDETOMIDINE HYDROCHLORIDE 1.5 MCG/KG/HR: 100 INJECTION, SOLUTION INTRAVENOUS at 20:45

## 2018-04-20 RX ADMIN — CEFEPIME 2 G: 2 INJECTION, POWDER, FOR SOLUTION INTRAVENOUS at 11:14

## 2018-04-20 RX ADMIN — INSULIN ASPART 6 UNITS: 100 INJECTION, SOLUTION INTRAVENOUS; SUBCUTANEOUS at 17:04

## 2018-04-20 RX ADMIN — DEXMEDETOMIDINE HYDROCHLORIDE 1.5 MCG/KG/HR: 100 INJECTION, SOLUTION INTRAVENOUS at 02:27

## 2018-04-20 RX ADMIN — INSULIN ASPART 4 UNITS: 100 INJECTION, SOLUTION INTRAVENOUS; SUBCUTANEOUS at 20:49

## 2018-04-20 RX ADMIN — CEFEPIME 2 G: 2 INJECTION, POWDER, FOR SOLUTION INTRAVENOUS at 02:26

## 2018-04-20 RX ADMIN — IPRATROPIUM BROMIDE AND ALBUTEROL SULFATE 3 ML: .5; 3 SOLUTION RESPIRATORY (INHALATION) at 06:41

## 2018-04-20 RX ADMIN — Medication 10 ML: at 20:52

## 2018-04-20 RX ADMIN — Medication 10 ML: at 09:04

## 2018-04-20 RX ADMIN — ACETAMINOPHEN 650 MG: 325 TABLET ORAL at 09:00

## 2018-04-20 RX ADMIN — BACLOFEN 20 MG: 10 TABLET ORAL at 17:09

## 2018-04-20 RX ADMIN — OXCARBAZEPINE 900 MG: 300 TABLET, FILM COATED ORAL at 20:50

## 2018-04-20 RX ADMIN — PREDNISONE 20 MG: 20 TABLET ORAL at 09:01

## 2018-04-20 RX ADMIN — INSULIN ASPART 6 UNITS: 100 INJECTION, SOLUTION INTRAVENOUS; SUBCUTANEOUS at 11:21

## 2018-04-20 RX ADMIN — BISACODYL 10 MG: 10 SUPPOSITORY RECTAL at 17:09

## 2018-04-20 RX ADMIN — LINEZOLID 600 MG: 600 INJECTION, SOLUTION INTRAVENOUS at 09:39

## 2018-04-20 RX ADMIN — OXCARBAZEPINE 900 MG: 300 TABLET, FILM COATED ORAL at 09:01

## 2018-04-20 RX ADMIN — LEVETIRACETAM 750 MG: 100 SOLUTION ORAL at 11:06

## 2018-04-20 RX ADMIN — CEFEPIME 2 G: 2 INJECTION, POWDER, FOR SOLUTION INTRAVENOUS at 18:22

## 2018-04-20 RX ADMIN — ACETAMINOPHEN 649.6 MG: 650 SOLUTION ORAL at 20:48

## 2018-04-20 RX ADMIN — DEXMEDETOMIDINE HYDROCHLORIDE 1.5 MCG/KG/HR: 100 INJECTION, SOLUTION INTRAVENOUS at 16:58

## 2018-04-20 RX ADMIN — BACLOFEN 20 MG: 10 TABLET ORAL at 20:51

## 2018-04-20 RX ADMIN — IPRATROPIUM BROMIDE AND ALBUTEROL SULFATE 3 ML: .5; 3 SOLUTION RESPIRATORY (INHALATION) at 12:25

## 2018-04-20 RX ADMIN — LEVETIRACETAM 750 MG: 100 SOLUTION ORAL at 20:51

## 2018-04-20 RX ADMIN — IPRATROPIUM BROMIDE AND ALBUTEROL SULFATE 3 ML: .5; 3 SOLUTION RESPIRATORY (INHALATION) at 00:54

## 2018-04-20 RX ADMIN — FINASTERIDE 5 MG: 5 TABLET, FILM COATED ORAL at 13:57

## 2018-04-20 RX ADMIN — PANTOPRAZOLE SODIUM 40 MG: 40 INJECTION, POWDER, FOR SOLUTION INTRAVENOUS at 05:54

## 2018-04-20 RX ADMIN — ACETYLCYSTEINE 3 ML: 200 SOLUTION ORAL; RESPIRATORY (INHALATION) at 19:25

## 2018-04-20 RX ADMIN — SODIUM CHLORIDE, PRESERVATIVE FREE 300 UNITS: 5 INJECTION INTRAVENOUS at 09:02

## 2018-04-20 RX ADMIN — METRONIDAZOLE 500 MG: 500 INJECTION, SOLUTION INTRAVENOUS at 17:10

## 2018-04-20 RX ADMIN — DEXMEDETOMIDINE HYDROCHLORIDE 1.5 MCG/KG/HR: 100 INJECTION, SOLUTION INTRAVENOUS at 05:54

## 2018-04-20 RX ADMIN — METRONIDAZOLE 500 MG: 500 INJECTION, SOLUTION INTRAVENOUS at 02:26

## 2018-04-20 RX ADMIN — ACETYLCYSTEINE 3 ML: 200 SOLUTION ORAL; RESPIRATORY (INHALATION) at 06:40

## 2018-04-20 RX ADMIN — SODIUM CHLORIDE, PRESERVATIVE FREE 300 UNITS: 5 INJECTION INTRAVENOUS at 20:49

## 2018-04-20 RX ADMIN — CHLORHEXIDINE GLUCONATE 15 ML: 1.2 RINSE ORAL at 20:51

## 2018-04-20 RX ADMIN — DANTROLENE SODIUM 25 MG: 25 CAPSULE ORAL at 20:51

## 2018-04-20 RX ADMIN — HEPARIN SODIUM 5000 UNITS: 5000 INJECTION, SOLUTION INTRAVENOUS; SUBCUTANEOUS at 20:51

## 2018-04-20 RX ADMIN — CASTOR OIL AND BALSAM, PERU: 788; 87 OINTMENT TOPICAL at 09:02

## 2018-04-20 RX ADMIN — DANTROLENE SODIUM 25 MG: 25 CAPSULE ORAL at 09:01

## 2018-04-20 RX ADMIN — ATORVASTATIN CALCIUM 10 MG: 10 TABLET, FILM COATED ORAL at 20:51

## 2018-04-20 RX ADMIN — Medication 1 CAPSULE: at 09:01

## 2018-04-20 RX ADMIN — DEXMEDETOMIDINE HYDROCHLORIDE 1.5 MCG/KG/HR: 100 INJECTION, SOLUTION INTRAVENOUS at 12:18

## 2018-04-21 ENCOUNTER — APPOINTMENT (OUTPATIENT)
Dept: GENERAL RADIOLOGY | Facility: HOSPITAL | Age: 43
End: 2018-04-21

## 2018-04-21 LAB
A-A DO2: 135.6 MMHG (ref 0–300)
ANION GAP SERPL CALCULATED.3IONS-SCNC: 7.6 MMOL/L (ref 3.6–11.2)
ARTERIAL PATENCY WRIST A: ABNORMAL
ATMOSPHERIC PRESS: 737 MMHG
BACTERIA SPEC AEROBE CULT: NO GROWTH
BACTERIA SPEC RESP CULT: ABNORMAL
BASE EXCESS BLDA CALC-SCNC: 1.9 MMOL/L
BDY SITE: ABNORMAL
BODY TEMPERATURE: 98.6 C
BUN BLD-MCNC: 11 MG/DL (ref 7–21)
BUN/CREAT SERPL: 26.8 (ref 7–25)
CALCIUM SPEC-SCNC: 8 MG/DL (ref 7.7–10)
CHLORIDE SERPL-SCNC: 93 MMOL/L (ref 99–112)
CO2 SERPL-SCNC: 26.4 MMOL/L (ref 24.3–31.9)
COHGB MFR BLD: 0.9 % (ref 0–5)
CREAT BLD-MCNC: 0.41 MG/DL (ref 0.43–1.29)
CRP SERPL-MCNC: 2.65 MG/DL (ref 0–0.99)
DEPRECATED RDW RBC AUTO: 48.8 FL (ref 37–54)
EOSINOPHIL # BLD MANUAL: 0.11 10*3/MM3 (ref 0–0.7)
EOSINOPHIL NFR BLD MANUAL: 1 % (ref 0–5)
ERYTHROCYTE [DISTWIDTH] IN BLOOD BY AUTOMATED COUNT: 16.8 % (ref 11.5–14.5)
GFR SERPL CREATININE-BSD FRML MDRD: >150 ML/MIN/1.73
GLUCOSE BLD-MCNC: 257 MG/DL (ref 70–110)
GLUCOSE BLDC GLUCOMTR-MCNC: 199 MG/DL (ref 70–130)
GLUCOSE BLDC GLUCOMTR-MCNC: 238 MG/DL (ref 70–130)
GLUCOSE BLDC GLUCOMTR-MCNC: 240 MG/DL (ref 70–130)
GLUCOSE BLDC GLUCOMTR-MCNC: 244 MG/DL (ref 70–130)
GRAM STN SPEC: ABNORMAL
GRAM STN SPEC: NORMAL
GRAM STN SPEC: NORMAL
HCO3 BLDA-SCNC: 25.5 MMOL/L (ref 22–26)
HCT VFR BLD AUTO: 38.5 % (ref 42–52)
HCT VFR BLD CALC: 37 % (ref 42–52)
HGB BLD-MCNC: 12.5 G/DL (ref 14–18)
HGB BLDA-MCNC: 12.7 G/DL (ref 12–16)
HOROWITZ INDEX BLD+IHG-RTO: 35 %
LYMPHOCYTES # BLD MANUAL: 2.49 10*3/MM3 (ref 1–3)
LYMPHOCYTES NFR BLD MANUAL: 23 % (ref 21–51)
LYMPHOCYTES NFR BLD MANUAL: 6 % (ref 0–10)
MAGNESIUM SERPL-MCNC: 1.9 MG/DL (ref 1.7–2.6)
MAGNESIUM SERPL-MCNC: 1.9 MG/DL (ref 1.7–2.6)
MAGNESIUM SERPL-MCNC: 2 MG/DL (ref 1.7–2.6)
MCH RBC QN AUTO: 25.9 PG (ref 27–33)
MCHC RBC AUTO-ENTMCNC: 32.5 G/DL (ref 33–37)
MCV RBC AUTO: 79.7 FL (ref 80–94)
METAMYELOCYTES NFR BLD MANUAL: 1 % (ref 0–0)
METHGB BLD QL: 0.2 % (ref 0–3)
MODALITY: ABNORMAL
MONOCYTES # BLD AUTO: 0.65 10*3/MM3 (ref 0.1–0.9)
NEUTROPHILS # BLD AUTO: 7.47 10*3/MM3 (ref 1.4–6.5)
NEUTROPHILS NFR BLD MANUAL: 68 % (ref 30–70)
NEUTS BAND NFR BLD MANUAL: 1 % (ref 4–12)
OSMOLALITY SERPL CALC.SUM OF ELEC: 263.4 MOSM/KG (ref 273–305)
OXYHGB MFR BLDV: 92.3 % (ref 85–100)
PCO2 BLDA: 36.5 MM HG (ref 35–45)
PEEP RESPIRATORY: 5 CM[H2O]
PH BLDA: 7.46 PH UNITS (ref 7.35–7.45)
PHOSPHATE SERPL-MCNC: 2.4 MG/DL (ref 2.7–4.5)
PHOSPHATE SERPL-MCNC: 2.4 MG/DL (ref 2.7–4.5)
PHOSPHATE SERPL-MCNC: 2.6 MG/DL (ref 2.7–4.5)
PLAT MORPH BLD: NORMAL
PLATELET # BLD AUTO: 234 10*3/MM3 (ref 130–400)
PMV BLD AUTO: 10.5 FL (ref 6–10)
PO2 BLDA: 63.5 MM HG (ref 80–100)
POTASSIUM BLD-SCNC: 4.7 MMOL/L (ref 3.5–5.3)
RBC # BLD AUTO: 4.83 10*6/MM3 (ref 4.7–6.1)
RBC MORPH BLD: NORMAL
SAO2 % BLDCOA: 93.3 % (ref 90–100)
SCAN SLIDE: NORMAL
SET MECH RESP RATE: 14
SODIUM BLD-SCNC: 127 MMOL/L (ref 135–153)
VENTILATOR MODE: AC
VT ON VENT VENT: 400 ML
WBC NRBC COR # BLD: 10.83 10*3/MM3 (ref 4.5–12.5)

## 2018-04-21 PROCEDURE — 82375 ASSAY CARBOXYHB QUANT: CPT | Performed by: INTERNAL MEDICINE

## 2018-04-21 PROCEDURE — 86140 C-REACTIVE PROTEIN: CPT | Performed by: INTERNAL MEDICINE

## 2018-04-21 PROCEDURE — 83735 ASSAY OF MAGNESIUM: CPT | Performed by: INTERNAL MEDICINE

## 2018-04-21 PROCEDURE — 94799 UNLISTED PULMONARY SVC/PX: CPT

## 2018-04-21 PROCEDURE — 94003 VENT MGMT INPAT SUBQ DAY: CPT

## 2018-04-21 PROCEDURE — 83050 HGB METHEMOGLOBIN QUAN: CPT | Performed by: INTERNAL MEDICINE

## 2018-04-21 PROCEDURE — 84100 ASSAY OF PHOSPHORUS: CPT | Performed by: INTERNAL MEDICINE

## 2018-04-21 PROCEDURE — 99291 CRITICAL CARE FIRST HOUR: CPT | Performed by: INTERNAL MEDICINE

## 2018-04-21 PROCEDURE — 99233 SBSQ HOSP IP/OBS HIGH 50: CPT | Performed by: INTERNAL MEDICINE

## 2018-04-21 PROCEDURE — 25010000002 PROPOFOL 1000 MG/ML EMULSION: Performed by: INTERNAL MEDICINE

## 2018-04-21 PROCEDURE — 71045 X-RAY EXAM CHEST 1 VIEW: CPT | Performed by: RADIOLOGY

## 2018-04-21 PROCEDURE — 87040 BLOOD CULTURE FOR BACTERIA: CPT | Performed by: HOSPITALIST

## 2018-04-21 PROCEDURE — 36600 WITHDRAWAL OF ARTERIAL BLOOD: CPT | Performed by: INTERNAL MEDICINE

## 2018-04-21 PROCEDURE — 85025 COMPLETE CBC W/AUTO DIFF WBC: CPT | Performed by: INTERNAL MEDICINE

## 2018-04-21 PROCEDURE — 71045 X-RAY EXAM CHEST 1 VIEW: CPT

## 2018-04-21 PROCEDURE — 82962 GLUCOSE BLOOD TEST: CPT

## 2018-04-21 PROCEDURE — 25010000002 LINEZOLID 600 MG/300ML SOLUTION: Performed by: INTERNAL MEDICINE

## 2018-04-21 PROCEDURE — 80048 BASIC METABOLIC PNL TOTAL CA: CPT | Performed by: INTERNAL MEDICINE

## 2018-04-21 PROCEDURE — 82805 BLOOD GASES W/O2 SATURATION: CPT | Performed by: INTERNAL MEDICINE

## 2018-04-21 PROCEDURE — 63710000001 PREDNISONE PER 1 MG: Performed by: INTERNAL MEDICINE

## 2018-04-21 PROCEDURE — 25010000002 HEPARIN FLUSH (PORCINE) 100 UNIT/ML SOLUTION: Performed by: INTERNAL MEDICINE

## 2018-04-21 PROCEDURE — 63710000001 INSULIN DETEMIR PER 5 UNITS: Performed by: INTERNAL MEDICINE

## 2018-04-21 PROCEDURE — 25010000002 CEFEPIME: Performed by: INTERNAL MEDICINE

## 2018-04-21 PROCEDURE — 63710000001 INSULIN ASPART PER 5 UNITS: Performed by: INTERNAL MEDICINE

## 2018-04-21 PROCEDURE — 85007 BL SMEAR W/DIFF WBC COUNT: CPT | Performed by: INTERNAL MEDICINE

## 2018-04-21 PROCEDURE — 25010000002 HEPARIN (PORCINE) PER 1000 UNITS: Performed by: INTERNAL MEDICINE

## 2018-04-21 PROCEDURE — 25010000002 KETOROLAC TROMETHAMINE PER 15 MG: Performed by: INTERNAL MEDICINE

## 2018-04-21 RX ORDER — ACETAMINOPHEN 325 MG/1
650 TABLET ORAL EVERY 6 HOURS PRN
Status: DISCONTINUED | OUTPATIENT
Start: 2018-04-21 | End: 2018-04-21

## 2018-04-21 RX ORDER — ACETAMINOPHEN 160 MG/5ML
650 SOLUTION ORAL EVERY 6 HOURS SCHEDULED
Status: DISCONTINUED | OUTPATIENT
Start: 2018-04-21 | End: 2018-04-23

## 2018-04-21 RX ORDER — ACETAMINOPHEN 650 MG/1
650 SUPPOSITORY RECTAL EVERY 6 HOURS SCHEDULED
Status: DISCONTINUED | OUTPATIENT
Start: 2018-04-21 | End: 2018-04-23

## 2018-04-21 RX ADMIN — ATORVASTATIN CALCIUM 10 MG: 10 TABLET, FILM COATED ORAL at 20:18

## 2018-04-21 RX ADMIN — METRONIDAZOLE 500 MG: 500 INJECTION, SOLUTION INTRAVENOUS at 17:25

## 2018-04-21 RX ADMIN — LEVETIRACETAM 750 MG: 100 SOLUTION ORAL at 08:37

## 2018-04-21 RX ADMIN — DEXMEDETOMIDINE HYDROCHLORIDE 1.5 MCG/KG/HR: 100 INJECTION, SOLUTION INTRAVENOUS at 12:07

## 2018-04-21 RX ADMIN — CEFEPIME 2 G: 2 INJECTION, POWDER, FOR SOLUTION INTRAVENOUS at 02:25

## 2018-04-21 RX ADMIN — CHLORHEXIDINE GLUCONATE 15 ML: 1.2 RINSE ORAL at 08:34

## 2018-04-21 RX ADMIN — INSULIN ASPART 4 UNITS: 100 INJECTION, SOLUTION INTRAVENOUS; SUBCUTANEOUS at 07:55

## 2018-04-21 RX ADMIN — CASTOR OIL AND BALSAM, PERU: 788; 87 OINTMENT TOPICAL at 08:34

## 2018-04-21 RX ADMIN — INSULIN DETEMIR 10 UNITS: 100 INJECTION, SOLUTION SUBCUTANEOUS at 10:01

## 2018-04-21 RX ADMIN — DEXMEDETOMIDINE HYDROCHLORIDE 1.5 MCG/KG/HR: 100 INJECTION, SOLUTION INTRAVENOUS at 19:49

## 2018-04-21 RX ADMIN — KETOROLAC TROMETHAMINE 30 MG: 30 INJECTION, SOLUTION INTRAMUSCULAR; INTRAVENOUS at 16:51

## 2018-04-21 RX ADMIN — PANTOPRAZOLE SODIUM 40 MG: 40 INJECTION, POWDER, FOR SOLUTION INTRAVENOUS at 06:21

## 2018-04-21 RX ADMIN — CHLORHEXIDINE GLUCONATE 15 ML: 1.2 RINSE ORAL at 20:19

## 2018-04-21 RX ADMIN — BACLOFEN 20 MG: 10 TABLET ORAL at 17:25

## 2018-04-21 RX ADMIN — DEXMEDETOMIDINE HYDROCHLORIDE 1.5 MCG/KG/HR: 100 INJECTION, SOLUTION INTRAVENOUS at 08:53

## 2018-04-21 RX ADMIN — ACETYLCYSTEINE 3 ML: 200 SOLUTION ORAL; RESPIRATORY (INHALATION) at 18:44

## 2018-04-21 RX ADMIN — DANTROLENE SODIUM 25 MG: 25 CAPSULE ORAL at 08:33

## 2018-04-21 RX ADMIN — DEXMEDETOMIDINE HYDROCHLORIDE 1.5 MCG/KG/HR: 100 INJECTION, SOLUTION INTRAVENOUS at 00:30

## 2018-04-21 RX ADMIN — ACETAMINOPHEN 650 MG: 650 SOLUTION ORAL at 23:19

## 2018-04-21 RX ADMIN — CEFEPIME 2 G: 2 INJECTION, POWDER, FOR SOLUTION INTRAVENOUS at 18:00

## 2018-04-21 RX ADMIN — Medication 10 ML: at 20:18

## 2018-04-21 RX ADMIN — OXCARBAZEPINE 900 MG: 300 TABLET, FILM COATED ORAL at 20:18

## 2018-04-21 RX ADMIN — PROPOFOL 20 MCG/KG/MIN: 10 INJECTION, EMULSION INTRAVENOUS at 20:03

## 2018-04-21 RX ADMIN — LINEZOLID 600 MG: 600 INJECTION, SOLUTION INTRAVENOUS at 21:27

## 2018-04-21 RX ADMIN — METRONIDAZOLE 500 MG: 500 INJECTION, SOLUTION INTRAVENOUS at 02:22

## 2018-04-21 RX ADMIN — IPRATROPIUM BROMIDE AND ALBUTEROL SULFATE 3 ML: .5; 3 SOLUTION RESPIRATORY (INHALATION) at 13:30

## 2018-04-21 RX ADMIN — IPRATROPIUM BROMIDE AND ALBUTEROL SULFATE 3 ML: .5; 3 SOLUTION RESPIRATORY (INHALATION) at 06:58

## 2018-04-21 RX ADMIN — SODIUM HYPOCHLORITE: 1.25 SOLUTION TOPICAL at 20:19

## 2018-04-21 RX ADMIN — Medication 10 ML: at 08:35

## 2018-04-21 RX ADMIN — DEXMEDETOMIDINE HYDROCHLORIDE 1.5 MCG/KG/HR: 100 INJECTION, SOLUTION INTRAVENOUS at 04:11

## 2018-04-21 RX ADMIN — PREDNISONE 20 MG: 20 TABLET ORAL at 08:33

## 2018-04-21 RX ADMIN — SODIUM CHLORIDE, PRESERVATIVE FREE 300 UNITS: 5 INJECTION INTRAVENOUS at 20:18

## 2018-04-21 RX ADMIN — DEXMEDETOMIDINE HYDROCHLORIDE 1.5 MCG/KG/HR: 100 INJECTION, SOLUTION INTRAVENOUS at 15:18

## 2018-04-21 RX ADMIN — ACETYLCYSTEINE: 200 SOLUTION ORAL; RESPIRATORY (INHALATION) at 06:57

## 2018-04-21 RX ADMIN — LEVOTHYROXINE SODIUM 75 MCG: 75 TABLET ORAL at 06:21

## 2018-04-21 RX ADMIN — OXCARBAZEPINE 900 MG: 300 TABLET, FILM COATED ORAL at 08:33

## 2018-04-21 RX ADMIN — DEXMEDETOMIDINE HYDROCHLORIDE 1.5 MCG/KG/HR: 100 INJECTION, SOLUTION INTRAVENOUS at 23:24

## 2018-04-21 RX ADMIN — INSULIN ASPART 4 UNITS: 100 INJECTION, SOLUTION INTRAVENOUS; SUBCUTANEOUS at 11:26

## 2018-04-21 RX ADMIN — CEFEPIME 2 G: 2 INJECTION, POWDER, FOR SOLUTION INTRAVENOUS at 10:42

## 2018-04-21 RX ADMIN — IPRATROPIUM BROMIDE AND ALBUTEROL SULFATE 3 ML: .5; 3 SOLUTION RESPIRATORY (INHALATION) at 18:44

## 2018-04-21 RX ADMIN — SODIUM HYPOCHLORITE: 1.25 SOLUTION TOPICAL at 08:34

## 2018-04-21 RX ADMIN — BACLOFEN 20 MG: 10 TABLET ORAL at 20:18

## 2018-04-21 RX ADMIN — ACETAMINOPHEN 650 MG: 650 SOLUTION ORAL at 17:25

## 2018-04-21 RX ADMIN — METRONIDAZOLE 500 MG: 500 INJECTION, SOLUTION INTRAVENOUS at 09:50

## 2018-04-21 RX ADMIN — HEPARIN SODIUM 5000 UNITS: 5000 INJECTION, SOLUTION INTRAVENOUS; SUBCUTANEOUS at 08:37

## 2018-04-21 RX ADMIN — FINASTERIDE 5 MG: 5 TABLET, FILM COATED ORAL at 08:33

## 2018-04-21 RX ADMIN — IPRATROPIUM BROMIDE AND ALBUTEROL SULFATE 3 ML: .5; 3 SOLUTION RESPIRATORY (INHALATION) at 00:49

## 2018-04-21 RX ADMIN — ACETAMINOPHEN 650 MG: 650 SOLUTION ORAL at 11:25

## 2018-04-21 RX ADMIN — INSULIN ASPART 4 UNITS: 100 INJECTION, SOLUTION INTRAVENOUS; SUBCUTANEOUS at 16:52

## 2018-04-21 RX ADMIN — DANTROLENE SODIUM 25 MG: 25 CAPSULE ORAL at 20:18

## 2018-04-21 RX ADMIN — HEPARIN SODIUM 5000 UNITS: 5000 INJECTION, SOLUTION INTRAVENOUS; SUBCUTANEOUS at 20:19

## 2018-04-21 RX ADMIN — CASTOR OIL AND BALSAM, PERU: 788; 87 OINTMENT TOPICAL at 20:19

## 2018-04-21 RX ADMIN — KETOROLAC TROMETHAMINE 30 MG: 30 INJECTION, SOLUTION INTRAMUSCULAR; INTRAVENOUS at 23:20

## 2018-04-21 RX ADMIN — ACETAMINOPHEN 649.6 MG: 650 SOLUTION ORAL at 02:21

## 2018-04-21 RX ADMIN — Medication 1 CAPSULE: at 08:33

## 2018-04-21 RX ADMIN — ACETAMINOPHEN 650 MG: 650 SOLUTION ORAL at 06:20

## 2018-04-21 RX ADMIN — LINEZOLID 600 MG: 600 INJECTION, SOLUTION INTRAVENOUS at 09:50

## 2018-04-21 RX ADMIN — INSULIN ASPART 2 UNITS: 100 INJECTION, SOLUTION INTRAVENOUS; SUBCUTANEOUS at 20:08

## 2018-04-21 RX ADMIN — LEVETIRACETAM 750 MG: 100 SOLUTION ORAL at 20:18

## 2018-04-22 LAB
A-A DO2: 116.9 MMHG (ref 0–300)
ANION GAP SERPL CALCULATED.3IONS-SCNC: 8.2 MMOL/L (ref 3.6–11.2)
ARTERIAL PATENCY WRIST A: POSITIVE
ATMOSPHERIC PRESS: 736 MMHG
BACTERIA SPEC RESP CULT: ABNORMAL
BASE EXCESS BLDA CALC-SCNC: 0.9 MMOL/L
BASOPHILS # BLD AUTO: 0.05 10*3/MM3 (ref 0–0.3)
BASOPHILS NFR BLD AUTO: 0.5 % (ref 0–2)
BDY SITE: ABNORMAL
BODY TEMPERATURE: 98.6 C
BUN BLD-MCNC: 13 MG/DL (ref 7–21)
BUN/CREAT SERPL: 27.7 (ref 7–25)
CALCIUM SPEC-SCNC: 8.3 MG/DL (ref 7.7–10)
CHLORIDE SERPL-SCNC: 89 MMOL/L (ref 99–112)
CHLORIDE UR-SCNC: 101 MMOL/L
CO2 SERPL-SCNC: 23.8 MMOL/L (ref 24.3–31.9)
COHGB MFR BLD: 1.2 % (ref 0–5)
CORTIS SERPL-MCNC: 28.6 MCG/DL
CREAT BLD-MCNC: 0.47 MG/DL (ref 0.43–1.29)
CRP SERPL-MCNC: 0.97 MG/DL (ref 0–0.99)
DEPRECATED RDW RBC AUTO: 47.9 FL (ref 37–54)
EOSINOPHIL # BLD AUTO: 0.05 10*3/MM3 (ref 0–0.7)
EOSINOPHIL NFR BLD AUTO: 0.5 % (ref 0–5)
ERYTHROCYTE [DISTWIDTH] IN BLOOD BY AUTOMATED COUNT: 16.7 % (ref 11.5–14.5)
GFR SERPL CREATININE-BSD FRML MDRD: >150 ML/MIN/1.73
GLUCOSE BLD-MCNC: 293 MG/DL (ref 70–110)
GLUCOSE BLDC GLUCOMTR-MCNC: 273 MG/DL (ref 70–130)
GLUCOSE BLDC GLUCOMTR-MCNC: 276 MG/DL (ref 70–130)
GLUCOSE BLDC GLUCOMTR-MCNC: 318 MG/DL (ref 70–130)
GLUCOSE BLDC GLUCOMTR-MCNC: 319 MG/DL (ref 70–130)
GRAM STN SPEC: ABNORMAL
HCO3 BLDA-SCNC: 24.4 MMOL/L (ref 22–26)
HCT VFR BLD AUTO: 38.3 % (ref 42–52)
HCT VFR BLD CALC: 39 % (ref 42–52)
HGB BLD-MCNC: 12.5 G/DL (ref 14–18)
HGB BLDA-MCNC: 13.2 G/DL (ref 12–16)
HOROWITZ INDEX BLD+IHG-RTO: 35 %
IMM GRANULOCYTES # BLD: 0.45 10*3/MM3 (ref 0–0.03)
IMM GRANULOCYTES NFR BLD: 4.2 % (ref 0–0.5)
LYMPHOCYTES # BLD AUTO: 2.09 10*3/MM3 (ref 1–3)
LYMPHOCYTES NFR BLD AUTO: 19.7 % (ref 21–51)
MAGNESIUM SERPL-MCNC: 1.9 MG/DL (ref 1.7–2.6)
MAGNESIUM SERPL-MCNC: 2 MG/DL (ref 1.7–2.6)
MAGNESIUM SERPL-MCNC: 2.1 MG/DL (ref 1.7–2.6)
MCH RBC QN AUTO: 25.6 PG (ref 27–33)
MCHC RBC AUTO-ENTMCNC: 32.6 G/DL (ref 33–37)
MCV RBC AUTO: 78.5 FL (ref 80–94)
METHGB BLD QL: 0.2 % (ref 0–3)
MODALITY: ABNORMAL
MONOCYTES # BLD AUTO: 0.95 10*3/MM3 (ref 0.1–0.9)
MONOCYTES NFR BLD AUTO: 9 % (ref 0–10)
NEUTROPHILS # BLD AUTO: 7 10*3/MM3 (ref 1.4–6.5)
NEUTROPHILS NFR BLD AUTO: 66.1 % (ref 30–70)
OSMOLALITY SERPL CALC.SUM OF ELEC: 255 MOSM/KG (ref 273–305)
OXYHGB MFR BLDV: 95.6 % (ref 85–100)
PCO2 BLDA: 35.4 MM HG (ref 35–45)
PEEP RESPIRATORY: 5 CM[H2O]
PH BLDA: 7.46 PH UNITS (ref 7.35–7.45)
PHOSPHATE SERPL-MCNC: 2.5 MG/DL (ref 2.7–4.5)
PHOSPHATE SERPL-MCNC: 2.5 MG/DL (ref 2.7–4.5)
PHOSPHATE SERPL-MCNC: 3.1 MG/DL (ref 2.7–4.5)
PLATELET # BLD AUTO: 268 10*3/MM3 (ref 130–400)
PMV BLD AUTO: 10.2 FL (ref 6–10)
PO2 BLDA: 83.1 MM HG (ref 80–100)
POTASSIUM BLD-SCNC: 5.1 MMOL/L (ref 3.5–5.3)
POTASSIUM UR-SCNC: 17 MMOL/L
RBC # BLD AUTO: 4.88 10*6/MM3 (ref 4.7–6.1)
SAO2 % BLDCOA: 97 % (ref 90–100)
SET MECH RESP RATE: 14
SODIUM BLD-SCNC: 121 MMOL/L (ref 135–153)
SODIUM BLD-SCNC: 126 MMOL/L (ref 135–153)
SODIUM UR-SCNC: 95 MMOL/L
VENTILATOR MODE: AC
VT ON VENT VENT: 400 ML
WBC NRBC COR # BLD: 10.59 10*3/MM3 (ref 4.5–12.5)

## 2018-04-22 PROCEDURE — 63710000001 INSULIN DETEMIR PER 5 UNITS: Performed by: INTERNAL MEDICINE

## 2018-04-22 PROCEDURE — 94799 UNLISTED PULMONARY SVC/PX: CPT

## 2018-04-22 PROCEDURE — 84133 ASSAY OF URINE POTASSIUM: CPT | Performed by: INTERNAL MEDICINE

## 2018-04-22 PROCEDURE — 94003 VENT MGMT INPAT SUBQ DAY: CPT

## 2018-04-22 PROCEDURE — 36600 WITHDRAWAL OF ARTERIAL BLOOD: CPT | Performed by: INTERNAL MEDICINE

## 2018-04-22 PROCEDURE — 84100 ASSAY OF PHOSPHORUS: CPT | Performed by: INTERNAL MEDICINE

## 2018-04-22 PROCEDURE — 25010000002 HYDROCORTISONE SODIUM SUCCINATE 100 MG RECONSTITUTED SOLUTION: Performed by: INTERNAL MEDICINE

## 2018-04-22 PROCEDURE — 99233 SBSQ HOSP IP/OBS HIGH 50: CPT | Performed by: INTERNAL MEDICINE

## 2018-04-22 PROCEDURE — 84300 ASSAY OF URINE SODIUM: CPT | Performed by: INTERNAL MEDICINE

## 2018-04-22 PROCEDURE — 80048 BASIC METABOLIC PNL TOTAL CA: CPT | Performed by: INTERNAL MEDICINE

## 2018-04-22 PROCEDURE — 82436 ASSAY OF URINE CHLORIDE: CPT | Performed by: INTERNAL MEDICINE

## 2018-04-22 PROCEDURE — 83735 ASSAY OF MAGNESIUM: CPT | Performed by: INTERNAL MEDICINE

## 2018-04-22 PROCEDURE — 82805 BLOOD GASES W/O2 SATURATION: CPT | Performed by: INTERNAL MEDICINE

## 2018-04-22 PROCEDURE — 83935 ASSAY OF URINE OSMOLALITY: CPT | Performed by: INTERNAL MEDICINE

## 2018-04-22 PROCEDURE — 82533 TOTAL CORTISOL: CPT | Performed by: INTERNAL MEDICINE

## 2018-04-22 PROCEDURE — 82962 GLUCOSE BLOOD TEST: CPT

## 2018-04-22 PROCEDURE — 25010000002 HEPARIN (PORCINE) PER 1000 UNITS: Performed by: INTERNAL MEDICINE

## 2018-04-22 PROCEDURE — 86140 C-REACTIVE PROTEIN: CPT | Performed by: PHYSICIAN ASSISTANT

## 2018-04-22 PROCEDURE — 83050 HGB METHEMOGLOBIN QUAN: CPT | Performed by: INTERNAL MEDICINE

## 2018-04-22 PROCEDURE — 85025 COMPLETE CBC W/AUTO DIFF WBC: CPT | Performed by: INTERNAL MEDICINE

## 2018-04-22 PROCEDURE — 25010000002 LINEZOLID 600 MG/300ML SOLUTION: Performed by: INTERNAL MEDICINE

## 2018-04-22 PROCEDURE — 63710000001 PREDNISONE PER 1 MG: Performed by: INTERNAL MEDICINE

## 2018-04-22 PROCEDURE — 99291 CRITICAL CARE FIRST HOUR: CPT | Performed by: INTERNAL MEDICINE

## 2018-04-22 PROCEDURE — 25010000002 PROPOFOL 1000 MG/ML EMULSION: Performed by: INTERNAL MEDICINE

## 2018-04-22 PROCEDURE — 63710000001 INSULIN ASPART PER 5 UNITS: Performed by: INTERNAL MEDICINE

## 2018-04-22 PROCEDURE — 82375 ASSAY CARBOXYHB QUANT: CPT | Performed by: INTERNAL MEDICINE

## 2018-04-22 PROCEDURE — 84295 ASSAY OF SERUM SODIUM: CPT | Performed by: INTERNAL MEDICINE

## 2018-04-22 PROCEDURE — 25010000002 HEPARIN FLUSH (PORCINE) 100 UNIT/ML SOLUTION: Performed by: INTERNAL MEDICINE

## 2018-04-22 PROCEDURE — 87040 BLOOD CULTURE FOR BACTERIA: CPT | Performed by: HOSPITALIST

## 2018-04-22 RX ORDER — SODIUM CHLORIDE 9 MG/ML
75 INJECTION, SOLUTION INTRAVENOUS CONTINUOUS
Status: DISCONTINUED | OUTPATIENT
Start: 2018-04-22 | End: 2018-04-23

## 2018-04-22 RX ADMIN — FINASTERIDE 5 MG: 5 TABLET, FILM COATED ORAL at 08:46

## 2018-04-22 RX ADMIN — HYDROCORTISONE SODIUM SUCCINATE 50 MG: 100 INJECTION, POWDER, FOR SOLUTION INTRAMUSCULAR; INTRAVENOUS at 20:37

## 2018-04-22 RX ADMIN — LEVETIRACETAM 750 MG: 100 SOLUTION ORAL at 20:25

## 2018-04-22 RX ADMIN — SODIUM CHLORIDE 75 ML/HR: 9 INJECTION, SOLUTION INTRAVENOUS at 17:19

## 2018-04-22 RX ADMIN — LINEZOLID 600 MG: 600 INJECTION, SOLUTION INTRAVENOUS at 09:48

## 2018-04-22 RX ADMIN — IPRATROPIUM BROMIDE AND ALBUTEROL SULFATE 3 ML: .5; 3 SOLUTION RESPIRATORY (INHALATION) at 12:50

## 2018-04-22 RX ADMIN — CHLORHEXIDINE GLUCONATE 15 ML: 1.2 RINSE ORAL at 20:26

## 2018-04-22 RX ADMIN — Medication 1 CAPSULE: at 08:46

## 2018-04-22 RX ADMIN — INSULIN ASPART 7 UNITS: 100 INJECTION, SOLUTION INTRAVENOUS; SUBCUTANEOUS at 07:32

## 2018-04-22 RX ADMIN — METRONIDAZOLE 500 MG: 500 INJECTION, SOLUTION INTRAVENOUS at 09:48

## 2018-04-22 RX ADMIN — PROPOFOL 35 MCG/KG/MIN: 10 INJECTION, EMULSION INTRAVENOUS at 20:07

## 2018-04-22 RX ADMIN — DEXMEDETOMIDINE HYDROCHLORIDE 1.5 MCG/KG/HR: 100 INJECTION, SOLUTION INTRAVENOUS at 23:22

## 2018-04-22 RX ADMIN — SODIUM HYPOCHLORITE: 1.25 SOLUTION TOPICAL at 20:25

## 2018-04-22 RX ADMIN — SODIUM HYPOCHLORITE: 1.25 SOLUTION TOPICAL at 08:48

## 2018-04-22 RX ADMIN — PROPOFOL 35 MCG/KG/MIN: 10 INJECTION, EMULSION INTRAVENOUS at 14:54

## 2018-04-22 RX ADMIN — SODIUM CHLORIDE 75 ML/HR: 9 INJECTION, SOLUTION INTRAVENOUS at 02:59

## 2018-04-22 RX ADMIN — METRONIDAZOLE 500 MG: 500 INJECTION, SOLUTION INTRAVENOUS at 17:21

## 2018-04-22 RX ADMIN — INSULIN ASPART 7 UNITS: 100 INJECTION, SOLUTION INTRAVENOUS; SUBCUTANEOUS at 12:25

## 2018-04-22 RX ADMIN — HEPARIN SODIUM 5000 UNITS: 5000 INJECTION, SOLUTION INTRAVENOUS; SUBCUTANEOUS at 08:46

## 2018-04-22 RX ADMIN — DEXMEDETOMIDINE HYDROCHLORIDE 1.5 MCG/KG/HR: 100 INJECTION, SOLUTION INTRAVENOUS at 10:36

## 2018-04-22 RX ADMIN — LINEZOLID 600 MG: 600 INJECTION, SOLUTION INTRAVENOUS at 20:38

## 2018-04-22 RX ADMIN — ACETYLCYSTEINE: 200 SOLUTION ORAL; RESPIRATORY (INHALATION) at 06:52

## 2018-04-22 RX ADMIN — DANTROLENE SODIUM 25 MG: 25 CAPSULE ORAL at 20:25

## 2018-04-22 RX ADMIN — DANTROLENE SODIUM 25 MG: 25 CAPSULE ORAL at 08:46

## 2018-04-22 RX ADMIN — PANTOPRAZOLE SODIUM 40 MG: 40 INJECTION, POWDER, FOR SOLUTION INTRAVENOUS at 06:20

## 2018-04-22 RX ADMIN — OXCARBAZEPINE 900 MG: 300 TABLET, FILM COATED ORAL at 20:25

## 2018-04-22 RX ADMIN — DEXMEDETOMIDINE HYDROCHLORIDE 1.5 MCG/KG/HR: 100 INJECTION, SOLUTION INTRAVENOUS at 06:20

## 2018-04-22 RX ADMIN — HYDROCORTISONE SODIUM SUCCINATE 50 MG: 100 INJECTION, POWDER, FOR SOLUTION INTRAMUSCULAR; INTRAVENOUS at 12:15

## 2018-04-22 RX ADMIN — ACETAMINOPHEN 650 MG: 650 SOLUTION ORAL at 12:29

## 2018-04-22 RX ADMIN — DEXMEDETOMIDINE HYDROCHLORIDE 1.5 MCG/KG/HR: 100 INJECTION, SOLUTION INTRAVENOUS at 02:58

## 2018-04-22 RX ADMIN — IPRATROPIUM BROMIDE AND ALBUTEROL SULFATE 3 ML: .5; 3 SOLUTION RESPIRATORY (INHALATION) at 18:55

## 2018-04-22 RX ADMIN — CHLORHEXIDINE GLUCONATE 15 ML: 1.2 RINSE ORAL at 08:47

## 2018-04-22 RX ADMIN — DEXMEDETOMIDINE HYDROCHLORIDE 1.5 MCG/KG/HR: 100 INJECTION, SOLUTION INTRAVENOUS at 19:12

## 2018-04-22 RX ADMIN — IPRATROPIUM BROMIDE AND ALBUTEROL SULFATE 3 ML: .5; 3 SOLUTION RESPIRATORY (INHALATION) at 06:52

## 2018-04-22 RX ADMIN — BACLOFEN 20 MG: 10 TABLET ORAL at 17:20

## 2018-04-22 RX ADMIN — PROPOFOL 35 MCG/KG/MIN: 10 INJECTION, EMULSION INTRAVENOUS at 06:20

## 2018-04-22 RX ADMIN — BACLOFEN 20 MG: 10 TABLET ORAL at 20:25

## 2018-04-22 RX ADMIN — Medication 10 ML: at 08:48

## 2018-04-22 RX ADMIN — CASTOR OIL AND BALSAM, PERU: 788; 87 OINTMENT TOPICAL at 20:25

## 2018-04-22 RX ADMIN — ATORVASTATIN CALCIUM 10 MG: 10 TABLET, FILM COATED ORAL at 20:25

## 2018-04-22 RX ADMIN — IPRATROPIUM BROMIDE AND ALBUTEROL SULFATE 3 ML: .5; 3 SOLUTION RESPIRATORY (INHALATION) at 00:59

## 2018-04-22 RX ADMIN — PREDNISONE 20 MG: 20 TABLET ORAL at 08:46

## 2018-04-22 RX ADMIN — ACETYLCYSTEINE 3 ML: 200 SOLUTION ORAL; RESPIRATORY (INHALATION) at 18:55

## 2018-04-22 RX ADMIN — HEPARIN SODIUM 5000 UNITS: 5000 INJECTION, SOLUTION INTRAVENOUS; SUBCUTANEOUS at 20:25

## 2018-04-22 RX ADMIN — CASTOR OIL AND BALSAM, PERU: 788; 87 OINTMENT TOPICAL at 08:48

## 2018-04-22 RX ADMIN — PROPOFOL 50 MCG/KG/MIN: 10 INJECTION, EMULSION INTRAVENOUS at 01:43

## 2018-04-22 RX ADMIN — LEVOTHYROXINE SODIUM 75 MCG: 75 TABLET ORAL at 06:20

## 2018-04-22 RX ADMIN — LEVETIRACETAM 750 MG: 100 SOLUTION ORAL at 08:46

## 2018-04-22 RX ADMIN — INSULIN ASPART 6 UNITS: 100 INJECTION, SOLUTION INTRAVENOUS; SUBCUTANEOUS at 20:38

## 2018-04-22 RX ADMIN — ACETAMINOPHEN 649.6 MG: 650 SOLUTION ORAL at 06:20

## 2018-04-22 RX ADMIN — ACETAMINOPHEN 650 MG: 650 SOLUTION ORAL at 17:21

## 2018-04-22 RX ADMIN — OXCARBAZEPINE 900 MG: 300 TABLET, FILM COATED ORAL at 08:45

## 2018-04-22 RX ADMIN — METRONIDAZOLE 500 MG: 500 INJECTION, SOLUTION INTRAVENOUS at 01:43

## 2018-04-22 RX ADMIN — DEXMEDETOMIDINE HYDROCHLORIDE 1.5 MCG/KG/HR: 100 INJECTION, SOLUTION INTRAVENOUS at 15:32

## 2018-04-22 RX ADMIN — SODIUM CHLORIDE, PRESERVATIVE FREE 300 UNITS: 5 INJECTION INTRAVENOUS at 08:46

## 2018-04-22 RX ADMIN — INSULIN DETEMIR 10 UNITS: 100 INJECTION, SOLUTION SUBCUTANEOUS at 08:48

## 2018-04-22 RX ADMIN — INSULIN ASPART 6 UNITS: 100 INJECTION, SOLUTION INTRAVENOUS; SUBCUTANEOUS at 16:33

## 2018-04-23 ENCOUNTER — APPOINTMENT (OUTPATIENT)
Dept: GENERAL RADIOLOGY | Facility: HOSPITAL | Age: 43
End: 2018-04-23

## 2018-04-23 LAB
A-A DO2: 100.9 MMHG (ref 0–300)
A-A DO2: 130.4 MMHG (ref 0–300)
A-A DO2: 136.3 MMHG (ref 0–300)
ANION GAP SERPL CALCULATED.3IONS-SCNC: 8.4 MMOL/L (ref 3.6–11.2)
ARTERIAL PATENCY WRIST A: ABNORMAL
ARTERIAL PATENCY WRIST A: POSITIVE
ATMOSPHERIC PRESS: 727 MMHG
ATMOSPHERIC PRESS: 727 MMHG
ATMOSPHERIC PRESS: 733 MMHG
BACTERIA SPEC AEROBE CULT: NORMAL
BACTERIA SPEC AEROBE CULT: NORMAL
BASE EXCESS BLDA CALC-SCNC: 3.5 MMOL/L
BASE EXCESS BLDA CALC-SCNC: 4.1 MMOL/L
BASE EXCESS BLDV CALC-SCNC: 3.2 MMOL/L
BASOPHILS # BLD AUTO: 0.06 10*3/MM3 (ref 0–0.3)
BASOPHILS NFR BLD AUTO: 0.5 % (ref 0–2)
BDY SITE: ABNORMAL
BDY SITE: ABNORMAL
BDY SITE: NORMAL
BODY TEMPERATURE: 98.6 C
BUN BLD-MCNC: 12 MG/DL (ref 7–21)
BUN/CREAT SERPL: 25 (ref 7–25)
CALCIUM SPEC-SCNC: 9.1 MG/DL (ref 7.7–10)
CHLORIDE SERPL-SCNC: 94 MMOL/L (ref 99–112)
CO2 SERPL-SCNC: 25.6 MMOL/L (ref 24.3–31.9)
COHGB MFR BLD: 1 % (ref 0–5)
COHGB MFR BLD: 1 % (ref 0–5)
CORTIS AM PEAK SERPL-MCNC: 7.4 MCG/DL (ref 4.3–22.4)
CREAT BLD-MCNC: 0.48 MG/DL (ref 0.43–1.29)
DEPRECATED RDW RBC AUTO: 48.5 FL (ref 37–54)
EOSINOPHIL # BLD AUTO: 0.03 10*3/MM3 (ref 0–0.7)
EOSINOPHIL NFR BLD AUTO: 0.3 % (ref 0–5)
EPAP: 4
ERYTHROCYTE [DISTWIDTH] IN BLOOD BY AUTOMATED COUNT: 17.3 % (ref 11.5–14.5)
GFR SERPL CREATININE-BSD FRML MDRD: >150 ML/MIN/1.73
GLUCOSE BLD-MCNC: 279 MG/DL (ref 70–110)
GLUCOSE BLDC GLUCOMTR-MCNC: 137 MG/DL (ref 70–130)
GLUCOSE BLDC GLUCOMTR-MCNC: 248 MG/DL (ref 70–130)
HCO3 BLDA-SCNC: 28.4 MMOL/L (ref 22–26)
HCO3 BLDA-SCNC: 28.9 MMOL/L (ref 22–26)
HCO3 BLDV-SCNC: 28.1 MMOL/L
HCT VFR BLD AUTO: 41.4 % (ref 42–52)
HCT VFR BLD CALC: 37 % (ref 42–52)
HCT VFR BLD CALC: 40 % (ref 42–52)
HGB BLD-MCNC: 13.5 G/DL (ref 14–18)
HGB BLDA-MCNC: 12.6 G/DL (ref 12–16)
HGB BLDA-MCNC: 12.8 G/DL (ref 12–16)
HGB BLDA-MCNC: 13.5 G/DL (ref 12–16)
HOROWITZ INDEX BLD+IHG-RTO: 35 %
HOROWITZ INDEX BLD+IHG-RTO: 35 %
HOROWITZ INDEX BLD+IHG-RTO: 40 %
IMM GRANULOCYTES # BLD: 0.5 10*3/MM3 (ref 0–0.03)
IMM GRANULOCYTES NFR BLD: 4.5 % (ref 0–0.5)
IPAP: 14
LYMPHOCYTES # BLD AUTO: 1.73 10*3/MM3 (ref 1–3)
LYMPHOCYTES NFR BLD AUTO: 15.6 % (ref 21–51)
MAGNESIUM SERPL-MCNC: 2 MG/DL (ref 1.7–2.6)
MAGNESIUM SERPL-MCNC: 2.2 MG/DL (ref 1.7–2.6)
MAGNESIUM SERPL-MCNC: 2.3 MG/DL (ref 1.7–2.6)
MCH RBC QN AUTO: 25.8 PG (ref 27–33)
MCHC RBC AUTO-ENTMCNC: 32.6 G/DL (ref 33–37)
MCV RBC AUTO: 79 FL (ref 80–94)
METHGB BLD QL: 0.2 % (ref 0–3)
METHGB BLD QL: 0.2 % (ref 0–3)
MODALITY: ABNORMAL
MODALITY: ABNORMAL
MODALITY: NORMAL
MONOCYTES # BLD AUTO: 1.08 10*3/MM3 (ref 0.1–0.9)
MONOCYTES NFR BLD AUTO: 9.7 % (ref 0–10)
NEUTROPHILS # BLD AUTO: 7.68 10*3/MM3 (ref 1.4–6.5)
NEUTROPHILS NFR BLD AUTO: 69.4 % (ref 30–70)
OSMOLALITY SERPL CALC.SUM OF ELEC: 266.9 MOSM/KG (ref 273–305)
OXYHGB MFR BLDV: 89.5 % (ref 85–100)
OXYHGB MFR BLDV: 95.6 % (ref 85–100)
PCO2 BLDA: 43.9 MM HG (ref 35–45)
PCO2 BLDA: 44.5 MM HG (ref 35–45)
PCO2 BLDV: 43.9 MM HG
PEEP RESPIRATORY: 5 CM[H2O]
PEEP RESPIRATORY: 5 CM[H2O]
PH BLDA: 7.43 PH UNITS (ref 7.35–7.45)
PH BLDA: 7.43 PH UNITS (ref 7.35–7.45)
PH BLDV: 7.42 PH UNITS
PHOSPHATE SERPL-MCNC: 3.1 MG/DL (ref 2.7–4.5)
PHOSPHATE SERPL-MCNC: 3.2 MG/DL (ref 2.7–4.5)
PHOSPHATE SERPL-MCNC: 4.3 MG/DL (ref 2.7–4.5)
PLATELET # BLD AUTO: 335 10*3/MM3 (ref 130–400)
PMV BLD AUTO: 9.8 FL (ref 6–10)
PO2 BLDA: 58 MM HG (ref 80–100)
PO2 BLDA: 85.2 MM HG (ref 80–100)
PO2 BLDV: 86.1 MM HG
POTASSIUM BLD-SCNC: 4.6 MMOL/L (ref 3.5–5.3)
PSV: 10 CMH2O
RBC # BLD AUTO: 5.24 10*6/MM3 (ref 4.7–6.1)
SAO2 % BLDCOA: 90.6 % (ref 90–100)
SAO2 % BLDCOA: 96.8 % (ref 90–100)
SAO2 % BLDCOV: 96.7 %
SET MECH RESP RATE: 14
SET MECH RESP RATE: 20
SODIUM BLD-SCNC: 128 MMOL/L (ref 135–153)
SODIUM BLD-SCNC: 128 MMOL/L (ref 135–153)
SODIUM BLD-SCNC: 129 MMOL/L (ref 135–153)
TSH SERPL DL<=0.05 MIU/L-ACNC: 0.75 MIU/ML (ref 0.55–4.78)
VENTILATOR MODE: AC
VENTILATOR MODE: NORMAL
VT ON VENT VENT: 350 ML
WBC NRBC COR # BLD: 11.08 10*3/MM3 (ref 4.5–12.5)

## 2018-04-23 PROCEDURE — 36600 WITHDRAWAL OF ARTERIAL BLOOD: CPT | Performed by: INTERNAL MEDICINE

## 2018-04-23 PROCEDURE — 94003 VENT MGMT INPAT SUBQ DAY: CPT

## 2018-04-23 PROCEDURE — 99233 SBSQ HOSP IP/OBS HIGH 50: CPT | Performed by: HOSPITALIST

## 2018-04-23 PROCEDURE — 83735 ASSAY OF MAGNESIUM: CPT | Performed by: INTERNAL MEDICINE

## 2018-04-23 PROCEDURE — 85025 COMPLETE CBC W/AUTO DIFF WBC: CPT | Performed by: INTERNAL MEDICINE

## 2018-04-23 PROCEDURE — 84100 ASSAY OF PHOSPHORUS: CPT | Performed by: INTERNAL MEDICINE

## 2018-04-23 PROCEDURE — 25010000002 HYDROCORTISONE SODIUM SUCCINATE 100 MG RECONSTITUTED SOLUTION: Performed by: INTERNAL MEDICINE

## 2018-04-23 PROCEDURE — 36600 WITHDRAWAL OF ARTERIAL BLOOD: CPT | Performed by: HOSPITALIST

## 2018-04-23 PROCEDURE — 94799 UNLISTED PULMONARY SVC/PX: CPT

## 2018-04-23 PROCEDURE — 63710000001 INSULIN DETEMIR PER 5 UNITS: Performed by: HOSPITALIST

## 2018-04-23 PROCEDURE — 63710000001 PREDNISONE PER 1 MG: Performed by: INTERNAL MEDICINE

## 2018-04-23 PROCEDURE — 99291 CRITICAL CARE FIRST HOUR: CPT | Performed by: INTERNAL MEDICINE

## 2018-04-23 PROCEDURE — 94660 CPAP INITIATION&MGMT: CPT

## 2018-04-23 PROCEDURE — 99231 SBSQ HOSP IP/OBS SF/LOW 25: CPT | Performed by: UROLOGY

## 2018-04-23 PROCEDURE — 82805 BLOOD GASES W/O2 SATURATION: CPT | Performed by: INTERNAL MEDICINE

## 2018-04-23 PROCEDURE — 82805 BLOOD GASES W/O2 SATURATION: CPT | Performed by: HOSPITALIST

## 2018-04-23 PROCEDURE — 80048 BASIC METABOLIC PNL TOTAL CA: CPT | Performed by: INTERNAL MEDICINE

## 2018-04-23 PROCEDURE — 25010000002 FUROSEMIDE PER 20 MG: Performed by: INTERNAL MEDICINE

## 2018-04-23 PROCEDURE — 82962 GLUCOSE BLOOD TEST: CPT

## 2018-04-23 PROCEDURE — 82533 TOTAL CORTISOL: CPT | Performed by: INTERNAL MEDICINE

## 2018-04-23 PROCEDURE — 83050 HGB METHEMOGLOBIN QUAN: CPT | Performed by: INTERNAL MEDICINE

## 2018-04-23 PROCEDURE — 25010000002 PROPOFOL 1000 MG/ML EMULSION: Performed by: INTERNAL MEDICINE

## 2018-04-23 PROCEDURE — 25010000002 HEPARIN (PORCINE) PER 1000 UNITS: Performed by: INTERNAL MEDICINE

## 2018-04-23 PROCEDURE — 84295 ASSAY OF SERUM SODIUM: CPT | Performed by: INTERNAL MEDICINE

## 2018-04-23 PROCEDURE — 63710000001 INSULIN ASPART PER 5 UNITS: Performed by: INTERNAL MEDICINE

## 2018-04-23 PROCEDURE — 84443 ASSAY THYROID STIM HORMONE: CPT | Performed by: INTERNAL MEDICINE

## 2018-04-23 PROCEDURE — 25010000003 POTASSIUM CHLORIDE 10 MEQ/100ML SOLUTION: Performed by: INTERNAL MEDICINE

## 2018-04-23 PROCEDURE — 63710000001 INSULIN DETEMIR PER 5 UNITS: Performed by: INTERNAL MEDICINE

## 2018-04-23 PROCEDURE — 25010000002 HEPARIN FLUSH (PORCINE) 100 UNIT/ML SOLUTION: Performed by: INTERNAL MEDICINE

## 2018-04-23 PROCEDURE — 71045 X-RAY EXAM CHEST 1 VIEW: CPT

## 2018-04-23 PROCEDURE — 82375 ASSAY CARBOXYHB QUANT: CPT | Performed by: HOSPITALIST

## 2018-04-23 PROCEDURE — 82375 ASSAY CARBOXYHB QUANT: CPT | Performed by: INTERNAL MEDICINE

## 2018-04-23 PROCEDURE — 25010000002 LINEZOLID 600 MG/300ML SOLUTION: Performed by: INTERNAL MEDICINE

## 2018-04-23 PROCEDURE — 83050 HGB METHEMOGLOBIN QUAN: CPT | Performed by: HOSPITALIST

## 2018-04-23 PROCEDURE — 71045 X-RAY EXAM CHEST 1 VIEW: CPT | Performed by: RADIOLOGY

## 2018-04-23 RX ORDER — POTASSIUM CHLORIDE 7.45 MG/ML
10 INJECTION INTRAVENOUS
Status: COMPLETED | OUTPATIENT
Start: 2018-04-23 | End: 2018-04-23

## 2018-04-23 RX ORDER — ACETAMINOPHEN 160 MG/5ML
650 SOLUTION ORAL EVERY 6 HOURS PRN
Status: DISCONTINUED | OUTPATIENT
Start: 2018-04-23 | End: 2018-05-02 | Stop reason: HOSPADM

## 2018-04-23 RX ORDER — FUROSEMIDE 10 MG/ML
20 INJECTION INTRAMUSCULAR; INTRAVENOUS ONCE
Status: COMPLETED | OUTPATIENT
Start: 2018-04-23 | End: 2018-04-23

## 2018-04-23 RX ADMIN — IPRATROPIUM BROMIDE AND ALBUTEROL SULFATE 3 ML: .5; 3 SOLUTION RESPIRATORY (INHALATION) at 12:20

## 2018-04-23 RX ADMIN — SODIUM CHLORIDE, PRESERVATIVE FREE 300 UNITS: 5 INJECTION INTRAVENOUS at 21:47

## 2018-04-23 RX ADMIN — HEPARIN SODIUM 5000 UNITS: 5000 INJECTION, SOLUTION INTRAVENOUS; SUBCUTANEOUS at 21:47

## 2018-04-23 RX ADMIN — POTASSIUM CHLORIDE 10 MEQ: 10 INJECTION, SOLUTION INTRAVENOUS at 17:37

## 2018-04-23 RX ADMIN — SODIUM HYPOCHLORITE: 1.25 SOLUTION TOPICAL at 21:50

## 2018-04-23 RX ADMIN — Medication 10 ML: at 08:26

## 2018-04-23 RX ADMIN — CASTOR OIL AND BALSAM, PERU: 788; 87 OINTMENT TOPICAL at 08:27

## 2018-04-23 RX ADMIN — DEXMEDETOMIDINE HYDROCHLORIDE 1 MCG/KG/HR: 100 INJECTION, SOLUTION INTRAVENOUS at 21:48

## 2018-04-23 RX ADMIN — BACLOFEN 20 MG: 10 TABLET ORAL at 21:49

## 2018-04-23 RX ADMIN — PROPOFOL 35 MCG/KG/MIN: 10 INJECTION, EMULSION INTRAVENOUS at 03:59

## 2018-04-23 RX ADMIN — METRONIDAZOLE 500 MG: 500 INJECTION, SOLUTION INTRAVENOUS at 17:38

## 2018-04-23 RX ADMIN — CHLORHEXIDINE GLUCONATE 15 ML: 1.2 RINSE ORAL at 08:27

## 2018-04-23 RX ADMIN — SODIUM HYPOCHLORITE: 1.25 SOLUTION TOPICAL at 08:26

## 2018-04-23 RX ADMIN — HYDROCORTISONE SODIUM SUCCINATE 50 MG: 100 INJECTION, POWDER, FOR SOLUTION INTRAMUSCULAR; INTRAVENOUS at 21:52

## 2018-04-23 RX ADMIN — IPRATROPIUM BROMIDE AND ALBUTEROL SULFATE 3 ML: .5; 3 SOLUTION RESPIRATORY (INHALATION) at 10:49

## 2018-04-23 RX ADMIN — INSULIN ASPART 2 UNITS: 100 INJECTION, SOLUTION INTRAVENOUS; SUBCUTANEOUS at 21:48

## 2018-04-23 RX ADMIN — PANTOPRAZOLE SODIUM 40 MG: 40 INJECTION, POWDER, FOR SOLUTION INTRAVENOUS at 06:03

## 2018-04-23 RX ADMIN — DANTROLENE SODIUM 25 MG: 25 CAPSULE ORAL at 08:24

## 2018-04-23 RX ADMIN — LINEZOLID 600 MG: 600 INJECTION, SOLUTION INTRAVENOUS at 10:27

## 2018-04-23 RX ADMIN — POTASSIUM CHLORIDE 10 MEQ: 10 INJECTION, SOLUTION INTRAVENOUS at 16:27

## 2018-04-23 RX ADMIN — METRONIDAZOLE 500 MG: 500 INJECTION, SOLUTION INTRAVENOUS at 10:27

## 2018-04-23 RX ADMIN — OXCARBAZEPINE 900 MG: 300 TABLET, FILM COATED ORAL at 08:24

## 2018-04-23 RX ADMIN — POTASSIUM CHLORIDE 10 MEQ: 10 INJECTION, SOLUTION INTRAVENOUS at 18:54

## 2018-04-23 RX ADMIN — DEXMEDETOMIDINE HYDROCHLORIDE 1.2 MCG/KG/HR: 100 INJECTION, SOLUTION INTRAVENOUS at 08:24

## 2018-04-23 RX ADMIN — INSULIN DETEMIR 10 UNITS: 100 INJECTION, SOLUTION SUBCUTANEOUS at 08:25

## 2018-04-23 RX ADMIN — ACETYLCYSTEINE 4 ML: 200 SOLUTION ORAL; RESPIRATORY (INHALATION) at 06:39

## 2018-04-23 RX ADMIN — INSULIN DETEMIR 5 UNITS: 100 INJECTION, SOLUTION SUBCUTANEOUS at 18:54

## 2018-04-23 RX ADMIN — ATORVASTATIN CALCIUM 10 MG: 10 TABLET, FILM COATED ORAL at 21:53

## 2018-04-23 RX ADMIN — POTASSIUM CHLORIDE 10 MEQ: 10 INJECTION, SOLUTION INTRAVENOUS at 21:54

## 2018-04-23 RX ADMIN — LEVOTHYROXINE SODIUM 75 MCG: 75 TABLET ORAL at 06:03

## 2018-04-23 RX ADMIN — LEVETIRACETAM 750 MG: 100 SOLUTION ORAL at 08:24

## 2018-04-23 RX ADMIN — ACETYLCYSTEINE 3 ML: 200 SOLUTION ORAL; RESPIRATORY (INHALATION) at 18:18

## 2018-04-23 RX ADMIN — SODIUM CHLORIDE, PRESERVATIVE FREE 300 UNITS: 5 INJECTION INTRAVENOUS at 08:25

## 2018-04-23 RX ADMIN — IPRATROPIUM BROMIDE AND ALBUTEROL SULFATE 3 ML: .5; 3 SOLUTION RESPIRATORY (INHALATION) at 00:38

## 2018-04-23 RX ADMIN — METRONIDAZOLE 500 MG: 500 INJECTION, SOLUTION INTRAVENOUS at 01:25

## 2018-04-23 RX ADMIN — DANTROLENE SODIUM 25 MG: 25 CAPSULE ORAL at 21:48

## 2018-04-23 RX ADMIN — FUROSEMIDE 20 MG: 10 INJECTION, SOLUTION INTRAMUSCULAR; INTRAVENOUS at 15:40

## 2018-04-23 RX ADMIN — IPRATROPIUM BROMIDE AND ALBUTEROL SULFATE 3 ML: .5; 3 SOLUTION RESPIRATORY (INHALATION) at 06:39

## 2018-04-23 RX ADMIN — PREDNISONE 20 MG: 20 TABLET ORAL at 08:24

## 2018-04-23 RX ADMIN — CASTOR OIL AND BALSAM, PERU: 788; 87 OINTMENT TOPICAL at 21:50

## 2018-04-23 RX ADMIN — FINASTERIDE 5 MG: 5 TABLET, FILM COATED ORAL at 08:24

## 2018-04-23 RX ADMIN — LINEZOLID 600 MG: 600 INJECTION, SOLUTION INTRAVENOUS at 21:49

## 2018-04-23 RX ADMIN — IPRATROPIUM BROMIDE AND ALBUTEROL SULFATE 3 ML: .5; 3 SOLUTION RESPIRATORY (INHALATION) at 18:09

## 2018-04-23 RX ADMIN — SODIUM CHLORIDE 1000 ML: 9 INJECTION, SOLUTION INTRAVENOUS at 01:40

## 2018-04-23 RX ADMIN — OXCARBAZEPINE 900 MG: 300 TABLET, FILM COATED ORAL at 21:47

## 2018-04-23 RX ADMIN — HYDROCORTISONE SODIUM SUCCINATE 50 MG: 100 INJECTION, POWDER, FOR SOLUTION INTRAMUSCULAR; INTRAVENOUS at 10:27

## 2018-04-23 RX ADMIN — Medication 1 CAPSULE: at 08:24

## 2018-04-23 RX ADMIN — DEXMEDETOMIDINE HYDROCHLORIDE 1.5 MCG/KG/HR: 100 INJECTION, SOLUTION INTRAVENOUS at 03:59

## 2018-04-23 RX ADMIN — LEVETIRACETAM 750 MG: 100 SOLUTION ORAL at 21:48

## 2018-04-24 ENCOUNTER — APPOINTMENT (OUTPATIENT)
Dept: GENERAL RADIOLOGY | Facility: HOSPITAL | Age: 43
End: 2018-04-24

## 2018-04-24 LAB
A-A DO2: 146.4 MMHG (ref 0–300)
ANION GAP SERPL CALCULATED.3IONS-SCNC: 9.7 MMOL/L (ref 3.6–11.2)
ARTERIAL PATENCY WRIST A: POSITIVE
ATMOSPHERIC PRESS: 727 MMHG
BASE EXCESS BLDA CALC-SCNC: 4.4 MMOL/L
BASOPHILS # BLD AUTO: 0.06 10*3/MM3 (ref 0–0.3)
BASOPHILS NFR BLD AUTO: 0.4 % (ref 0–2)
BDY SITE: ABNORMAL
BODY TEMPERATURE: 98.6 C
BUN BLD-MCNC: 17 MG/DL (ref 7–21)
BUN/CREAT SERPL: 44.7 (ref 7–25)
CALCIUM SPEC-SCNC: 9 MG/DL (ref 7.7–10)
CHLORIDE SERPL-SCNC: 89 MMOL/L (ref 99–112)
CO2 SERPL-SCNC: 27.3 MMOL/L (ref 24.3–31.9)
COHGB MFR BLD: 1.1 % (ref 0–5)
CREAT BLD-MCNC: 0.38 MG/DL (ref 0.43–1.29)
CRP SERPL-MCNC: <0.5 MG/DL (ref 0–0.99)
DEPRECATED RDW RBC AUTO: 48.9 FL (ref 37–54)
EOSINOPHIL # BLD AUTO: 0.39 10*3/MM3 (ref 0–0.7)
EOSINOPHIL NFR BLD AUTO: 2.4 % (ref 0–5)
EPAP: 4
ERYTHROCYTE [DISTWIDTH] IN BLOOD BY AUTOMATED COUNT: 17.5 % (ref 11.5–14.5)
GFR SERPL CREATININE-BSD FRML MDRD: >150 ML/MIN/1.73
GLUCOSE BLD-MCNC: 122 MG/DL (ref 70–110)
GLUCOSE BLDC GLUCOMTR-MCNC: 156 MG/DL (ref 70–130)
GLUCOSE BLDC GLUCOMTR-MCNC: 169 MG/DL (ref 70–130)
GLUCOSE BLDC GLUCOMTR-MCNC: 174 MG/DL (ref 70–130)
GLUCOSE BLDC GLUCOMTR-MCNC: 180 MG/DL (ref 70–130)
HCO3 BLDA-SCNC: 28.8 MMOL/L (ref 22–26)
HCT VFR BLD AUTO: 40 % (ref 42–52)
HCT VFR BLD CALC: 39 % (ref 42–52)
HGB BLD-MCNC: 13.1 G/DL (ref 14–18)
HGB BLDA-MCNC: 13.1 G/DL (ref 12–16)
HOROWITZ INDEX BLD+IHG-RTO: 40 %
IMM GRANULOCYTES # BLD: 0.35 10*3/MM3 (ref 0–0.03)
IMM GRANULOCYTES NFR BLD: 2.1 % (ref 0–0.5)
IPAP: 14
LYMPHOCYTES # BLD AUTO: 1.56 10*3/MM3 (ref 1–3)
LYMPHOCYTES NFR BLD AUTO: 9.6 % (ref 21–51)
MAGNESIUM SERPL-MCNC: 2 MG/DL (ref 1.7–2.6)
MAGNESIUM SERPL-MCNC: 2.1 MG/DL (ref 1.7–2.6)
MAGNESIUM SERPL-MCNC: 2.2 MG/DL (ref 1.7–2.6)
MCH RBC QN AUTO: 26 PG (ref 27–33)
MCHC RBC AUTO-ENTMCNC: 32.8 G/DL (ref 33–37)
MCV RBC AUTO: 79.5 FL (ref 80–94)
METHGB BLD QL: 0.2 % (ref 0–3)
MODALITY: ABNORMAL
MONOCYTES # BLD AUTO: 0.79 10*3/MM3 (ref 0.1–0.9)
MONOCYTES NFR BLD AUTO: 4.8 % (ref 0–10)
NEUTROPHILS # BLD AUTO: 13.16 10*3/MM3 (ref 1.4–6.5)
NEUTROPHILS NFR BLD AUTO: 80.7 % (ref 30–70)
OSMOLALITY SERPL CALC.SUM OF ELEC: 256.2 MOSM/KG (ref 273–305)
OXYHGB MFR BLDV: 94.6 % (ref 85–100)
PCO2 BLDA: 42.2 MM HG (ref 35–45)
PH BLDA: 7.45 PH UNITS (ref 7.35–7.45)
PHOSPHATE SERPL-MCNC: 4.3 MG/DL (ref 2.7–4.5)
PHOSPHATE SERPL-MCNC: 4.5 MG/DL (ref 2.7–4.5)
PHOSPHATE SERPL-MCNC: 4.7 MG/DL (ref 2.7–4.5)
PLATELET # BLD AUTO: 332 10*3/MM3 (ref 130–400)
PMV BLD AUTO: 10.4 FL (ref 6–10)
PO2 BLDA: 77.1 MM HG (ref 80–100)
POTASSIUM BLD-SCNC: 4.9 MMOL/L (ref 3.5–5.3)
RBC # BLD AUTO: 5.03 10*6/MM3 (ref 4.7–6.1)
SAO2 % BLDCOA: 95.8 % (ref 90–100)
SODIUM BLD-SCNC: 126 MMOL/L (ref 135–153)
SODIUM BLD-SCNC: 127 MMOL/L (ref 135–153)
SODIUM BLD-SCNC: 128 MMOL/L (ref 135–153)
SODIUM UR-SCNC: 116 MMOL/L
WBC NRBC COR # BLD: 16.31 10*3/MM3 (ref 4.5–12.5)

## 2018-04-24 PROCEDURE — 80048 BASIC METABOLIC PNL TOTAL CA: CPT | Performed by: INTERNAL MEDICINE

## 2018-04-24 PROCEDURE — 83735 ASSAY OF MAGNESIUM: CPT | Performed by: INTERNAL MEDICINE

## 2018-04-24 PROCEDURE — 71045 X-RAY EXAM CHEST 1 VIEW: CPT | Performed by: RADIOLOGY

## 2018-04-24 PROCEDURE — 84100 ASSAY OF PHOSPHORUS: CPT | Performed by: INTERNAL MEDICINE

## 2018-04-24 PROCEDURE — 25010000002 HYDROCORTISONE SODIUM SUCCINATE 100 MG RECONSTITUTED SOLUTION: Performed by: INTERNAL MEDICINE

## 2018-04-24 PROCEDURE — 82375 ASSAY CARBOXYHB QUANT: CPT | Performed by: INTERNAL MEDICINE

## 2018-04-24 PROCEDURE — 84295 ASSAY OF SERUM SODIUM: CPT | Performed by: INTERNAL MEDICINE

## 2018-04-24 PROCEDURE — 25010000002 LINEZOLID 600 MG/300ML SOLUTION: Performed by: INTERNAL MEDICINE

## 2018-04-24 PROCEDURE — 84300 ASSAY OF URINE SODIUM: CPT | Performed by: INTERNAL MEDICINE

## 2018-04-24 PROCEDURE — 94660 CPAP INITIATION&MGMT: CPT

## 2018-04-24 PROCEDURE — 82805 BLOOD GASES W/O2 SATURATION: CPT | Performed by: INTERNAL MEDICINE

## 2018-04-24 PROCEDURE — 83050 HGB METHEMOGLOBIN QUAN: CPT | Performed by: INTERNAL MEDICINE

## 2018-04-24 PROCEDURE — 82962 GLUCOSE BLOOD TEST: CPT

## 2018-04-24 PROCEDURE — 63710000001 INSULIN DETEMIR PER 5 UNITS: Performed by: HOSPITALIST

## 2018-04-24 PROCEDURE — 25010000002 HEPARIN (PORCINE) PER 1000 UNITS: Performed by: INTERNAL MEDICINE

## 2018-04-24 PROCEDURE — 25010000002 FUROSEMIDE PER 20 MG: Performed by: INTERNAL MEDICINE

## 2018-04-24 PROCEDURE — 99291 CRITICAL CARE FIRST HOUR: CPT | Performed by: INTERNAL MEDICINE

## 2018-04-24 PROCEDURE — 25010000002 ONDANSETRON PER 1 MG: Performed by: INTERNAL MEDICINE

## 2018-04-24 PROCEDURE — 63710000001 PREDNISONE PER 1 MG: Performed by: INTERNAL MEDICINE

## 2018-04-24 PROCEDURE — 94799 UNLISTED PULMONARY SVC/PX: CPT

## 2018-04-24 PROCEDURE — 36600 WITHDRAWAL OF ARTERIAL BLOOD: CPT | Performed by: INTERNAL MEDICINE

## 2018-04-24 PROCEDURE — 71045 X-RAY EXAM CHEST 1 VIEW: CPT

## 2018-04-24 PROCEDURE — 25010000002 HEPARIN FLUSH (PORCINE) 100 UNIT/ML SOLUTION: Performed by: INTERNAL MEDICINE

## 2018-04-24 PROCEDURE — 86140 C-REACTIVE PROTEIN: CPT | Performed by: PHYSICIAN ASSISTANT

## 2018-04-24 PROCEDURE — 99232 SBSQ HOSP IP/OBS MODERATE 35: CPT | Performed by: HOSPITALIST

## 2018-04-24 PROCEDURE — 85025 COMPLETE CBC W/AUTO DIFF WBC: CPT | Performed by: INTERNAL MEDICINE

## 2018-04-24 PROCEDURE — 63710000001 INSULIN ASPART PER 5 UNITS: Performed by: INTERNAL MEDICINE

## 2018-04-24 PROCEDURE — 25010000002 METOCLOPRAMIDE PER 10 MG: Performed by: INTERNAL MEDICINE

## 2018-04-24 RX ORDER — ONDANSETRON 2 MG/ML
4 INJECTION INTRAMUSCULAR; INTRAVENOUS EVERY 6 HOURS PRN
Status: DISCONTINUED | OUTPATIENT
Start: 2018-04-24 | End: 2018-05-02 | Stop reason: HOSPADM

## 2018-04-24 RX ORDER — METOCLOPRAMIDE HYDROCHLORIDE 5 MG/ML
2.5 INJECTION INTRAMUSCULAR; INTRAVENOUS ONCE
Status: COMPLETED | OUTPATIENT
Start: 2018-04-24 | End: 2018-04-24

## 2018-04-24 RX ORDER — FUROSEMIDE 10 MG/ML
40 INJECTION INTRAMUSCULAR; INTRAVENOUS ONCE
Status: COMPLETED | OUTPATIENT
Start: 2018-04-24 | End: 2018-04-24

## 2018-04-24 RX ORDER — SODIUM CHLORIDE 9 MG/ML
INJECTION, SOLUTION INTRAVENOUS
Status: COMPLETED
Start: 2018-04-24 | End: 2018-04-24

## 2018-04-24 RX ORDER — SODIUM CHLORIDE 9 MG/ML
75 INJECTION, SOLUTION INTRAVENOUS CONTINUOUS
Status: DISCONTINUED | OUTPATIENT
Start: 2018-04-24 | End: 2018-04-25

## 2018-04-24 RX ADMIN — SODIUM HYPOCHLORITE: 1.25 SOLUTION TOPICAL at 20:58

## 2018-04-24 RX ADMIN — ACETYLCYSTEINE 4 ML: 200 SOLUTION ORAL; RESPIRATORY (INHALATION) at 06:38

## 2018-04-24 RX ADMIN — PREDNISONE 20 MG: 20 TABLET ORAL at 08:50

## 2018-04-24 RX ADMIN — BACLOFEN 20 MG: 10 TABLET ORAL at 20:57

## 2018-04-24 RX ADMIN — IPRATROPIUM BROMIDE AND ALBUTEROL SULFATE 3 ML: .5; 3 SOLUTION RESPIRATORY (INHALATION) at 06:38

## 2018-04-24 RX ADMIN — METRONIDAZOLE 500 MG: 500 INJECTION, SOLUTION INTRAVENOUS at 02:10

## 2018-04-24 RX ADMIN — IPRATROPIUM BROMIDE AND ALBUTEROL SULFATE 3 ML: .5; 3 SOLUTION RESPIRATORY (INHALATION) at 12:23

## 2018-04-24 RX ADMIN — BACLOFEN 20 MG: 10 TABLET ORAL at 17:23

## 2018-04-24 RX ADMIN — METOCLOPRAMIDE 2.5 MG: 5 INJECTION, SOLUTION INTRAMUSCULAR; INTRAVENOUS at 11:28

## 2018-04-24 RX ADMIN — HEPARIN SODIUM 5000 UNITS: 5000 INJECTION, SOLUTION INTRAVENOUS; SUBCUTANEOUS at 08:49

## 2018-04-24 RX ADMIN — LEVETIRACETAM 750 MG: 100 SOLUTION ORAL at 08:48

## 2018-04-24 RX ADMIN — SODIUM CHLORIDE 75 ML/HR: 9 INJECTION, SOLUTION INTRAVENOUS at 08:48

## 2018-04-24 RX ADMIN — Medication 10 ML: at 20:58

## 2018-04-24 RX ADMIN — IPRATROPIUM BROMIDE AND ALBUTEROL SULFATE 3 ML: .5; 3 SOLUTION RESPIRATORY (INHALATION) at 19:02

## 2018-04-24 RX ADMIN — ONDANSETRON 4 MG: 2 INJECTION INTRAMUSCULAR; INTRAVENOUS at 08:50

## 2018-04-24 RX ADMIN — IPRATROPIUM BROMIDE AND ALBUTEROL SULFATE 3 ML: .5; 3 SOLUTION RESPIRATORY (INHALATION) at 00:34

## 2018-04-24 RX ADMIN — Medication 1 CAPSULE: at 08:49

## 2018-04-24 RX ADMIN — ACETYLCYSTEINE 3 ML: 200 SOLUTION ORAL; RESPIRATORY (INHALATION) at 19:02

## 2018-04-24 RX ADMIN — LEVETIRACETAM 750 MG: 100 SOLUTION ORAL at 20:57

## 2018-04-24 RX ADMIN — LINEZOLID 600 MG: 600 INJECTION, SOLUTION INTRAVENOUS at 09:31

## 2018-04-24 RX ADMIN — ATORVASTATIN CALCIUM 10 MG: 10 TABLET, FILM COATED ORAL at 20:58

## 2018-04-24 RX ADMIN — SODIUM HYPOCHLORITE: 1.25 SOLUTION TOPICAL at 08:50

## 2018-04-24 RX ADMIN — FUROSEMIDE 40 MG: 10 INJECTION, SOLUTION INTRAMUSCULAR; INTRAVENOUS at 06:30

## 2018-04-24 RX ADMIN — DANTROLENE SODIUM 25 MG: 25 CAPSULE ORAL at 20:57

## 2018-04-24 RX ADMIN — HYDROCORTISONE SODIUM SUCCINATE 50 MG: 100 INJECTION, POWDER, FOR SOLUTION INTRAMUSCULAR; INTRAVENOUS at 09:32

## 2018-04-24 RX ADMIN — INSULIN ASPART 2 UNITS: 100 INJECTION, SOLUTION INTRAVENOUS; SUBCUTANEOUS at 17:24

## 2018-04-24 RX ADMIN — OXCARBAZEPINE 900 MG: 300 TABLET, FILM COATED ORAL at 08:49

## 2018-04-24 RX ADMIN — DANTROLENE SODIUM 25 MG: 25 CAPSULE ORAL at 08:50

## 2018-04-24 RX ADMIN — METRONIDAZOLE 500 MG: 500 INJECTION, SOLUTION INTRAVENOUS at 09:30

## 2018-04-24 RX ADMIN — SODIUM CHLORIDE, PRESERVATIVE FREE 300 UNITS: 5 INJECTION INTRAVENOUS at 20:57

## 2018-04-24 RX ADMIN — PANTOPRAZOLE SODIUM 40 MG: 40 INJECTION, POWDER, FOR SOLUTION INTRAVENOUS at 06:03

## 2018-04-24 RX ADMIN — SODIUM CHLORIDE 75 ML/HR: 9 INJECTION, SOLUTION INTRAVENOUS at 20:59

## 2018-04-24 RX ADMIN — FINASTERIDE 5 MG: 5 TABLET, FILM COATED ORAL at 08:49

## 2018-04-24 RX ADMIN — CASTOR OIL AND BALSAM, PERU: 788; 87 OINTMENT TOPICAL at 08:49

## 2018-04-24 RX ADMIN — OXCARBAZEPINE 900 MG: 300 TABLET, FILM COATED ORAL at 20:58

## 2018-04-24 RX ADMIN — INSULIN ASPART 2 UNITS: 100 INJECTION, SOLUTION INTRAVENOUS; SUBCUTANEOUS at 20:58

## 2018-04-24 RX ADMIN — INSULIN DETEMIR 15 UNITS: 100 INJECTION, SOLUTION SUBCUTANEOUS at 08:51

## 2018-04-24 RX ADMIN — HEPARIN SODIUM 5000 UNITS: 5000 INJECTION, SOLUTION INTRAVENOUS; SUBCUTANEOUS at 20:58

## 2018-04-24 RX ADMIN — HYDROCORTISONE SODIUM SUCCINATE 50 MG: 100 INJECTION, POWDER, FOR SOLUTION INTRAMUSCULAR; INTRAVENOUS at 20:57

## 2018-04-24 RX ADMIN — CASTOR OIL AND BALSAM, PERU: 788; 87 OINTMENT TOPICAL at 20:58

## 2018-04-24 RX ADMIN — Medication 10 ML: at 08:52

## 2018-04-24 RX ADMIN — METRONIDAZOLE 500 MG: 500 INJECTION, SOLUTION INTRAVENOUS at 17:24

## 2018-04-24 RX ADMIN — LEVOTHYROXINE SODIUM 75 MCG: 75 TABLET ORAL at 06:03

## 2018-04-24 RX ADMIN — LINEZOLID 600 MG: 600 INJECTION, SOLUTION INTRAVENOUS at 21:26

## 2018-04-25 ENCOUNTER — APPOINTMENT (OUTPATIENT)
Dept: GENERAL RADIOLOGY | Facility: HOSPITAL | Age: 43
End: 2018-04-25

## 2018-04-25 LAB
A-A DO2: 91.8 MMHG (ref 0–300)
A-A DO2: >300 MMHG (ref 0–300)
ALBUMIN SERPL-MCNC: 3.2 G/DL (ref 3.5–5)
ALBUMIN/GLOB SERPL: 1.3 G/DL (ref 1.5–2.5)
ALP SERPL-CCNC: 67 U/L (ref 40–129)
ALT SERPL W P-5'-P-CCNC: 24 U/L (ref 10–44)
ANION GAP SERPL CALCULATED.3IONS-SCNC: 12.3 MMOL/L (ref 3.6–11.2)
ARTERIAL PATENCY WRIST A: ABNORMAL
ARTERIAL PATENCY WRIST A: ABNORMAL
AST SERPL-CCNC: 26 U/L (ref 10–34)
ATMOSPHERIC PRESS: 724 MMHG
ATMOSPHERIC PRESS: 725 MMHG
BASE EXCESS BLDA CALC-SCNC: 2.3 MMOL/L
BASE EXCESS BLDA CALC-SCNC: 3.3 MMOL/L
BASOPHILS # BLD AUTO: 0.04 10*3/MM3 (ref 0–0.3)
BASOPHILS NFR BLD AUTO: 0.3 % (ref 0–2)
BDY SITE: ABNORMAL
BDY SITE: ABNORMAL
BILIRUB SERPL-MCNC: 0.3 MG/DL (ref 0.2–1.8)
BODY TEMPERATURE: 98.6 C
BODY TEMPERATURE: 98.9 C
BUN BLD-MCNC: 7 MG/DL (ref 7–21)
BUN/CREAT SERPL: 16.3 (ref 7–25)
CALCIUM SPEC-SCNC: 8.6 MG/DL (ref 7.7–10)
CHLORIDE SERPL-SCNC: 94 MMOL/L (ref 99–112)
CO2 SERPL-SCNC: 23.7 MMOL/L (ref 24.3–31.9)
COHGB MFR BLD: 0.8 % (ref 0–5)
COHGB MFR BLD: 1 % (ref 0–5)
CREAT BLD-MCNC: 0.43 MG/DL (ref 0.43–1.29)
DEPRECATED RDW RBC AUTO: 50.7 FL (ref 37–54)
EOSINOPHIL # BLD AUTO: 0.03 10*3/MM3 (ref 0–0.7)
EOSINOPHIL NFR BLD AUTO: 0.2 % (ref 0–5)
ERYTHROCYTE [DISTWIDTH] IN BLOOD BY AUTOMATED COUNT: 18 % (ref 11.5–14.5)
GAS FLOW AIRWAY: 50 LPM
GFR SERPL CREATININE-BSD FRML MDRD: >150 ML/MIN/1.73
GLOBULIN UR ELPH-MCNC: 2.5 GM/DL
GLUCOSE BLD-MCNC: 279 MG/DL (ref 70–110)
GLUCOSE BLDC GLUCOMTR-MCNC: 141 MG/DL (ref 70–130)
GLUCOSE BLDC GLUCOMTR-MCNC: 150 MG/DL (ref 70–130)
GLUCOSE BLDC GLUCOMTR-MCNC: 182 MG/DL (ref 70–130)
GLUCOSE BLDC GLUCOMTR-MCNC: 302 MG/DL (ref 70–130)
HCO3 BLDA-SCNC: 26.2 MMOL/L (ref 22–26)
HCO3 BLDA-SCNC: 26.7 MMOL/L (ref 22–26)
HCT VFR BLD AUTO: 43.5 % (ref 42–52)
HCT VFR BLD CALC: 39 % (ref 42–52)
HCT VFR BLD CALC: 41 % (ref 42–52)
HGB BLD-MCNC: 14.2 G/DL (ref 14–18)
HGB BLDA-MCNC: 13.2 G/DL (ref 12–16)
HGB BLDA-MCNC: 13.9 G/DL (ref 12–16)
HOROWITZ INDEX BLD+IHG-RTO: 100 %
HOROWITZ INDEX BLD+IHG-RTO: 40 %
IMM GRANULOCYTES # BLD: 0.42 10*3/MM3 (ref 0–0.03)
IMM GRANULOCYTES NFR BLD: 2.8 % (ref 0–0.5)
LYMPHOCYTES # BLD AUTO: 1.22 10*3/MM3 (ref 1–3)
LYMPHOCYTES NFR BLD AUTO: 8.3 % (ref 21–51)
MAGNESIUM SERPL-MCNC: 2 MG/DL (ref 1.7–2.6)
MAGNESIUM SERPL-MCNC: 2 MG/DL (ref 1.7–2.6)
MAGNESIUM SERPL-MCNC: 2.1 MG/DL (ref 1.7–2.6)
MCH RBC QN AUTO: 26.3 PG (ref 27–33)
MCHC RBC AUTO-ENTMCNC: 32.6 G/DL (ref 33–37)
MCV RBC AUTO: 80.6 FL (ref 80–94)
METHGB BLD QL: 0.2 % (ref 0–3)
METHGB BLD QL: 0.3 % (ref 0–3)
MODALITY: ABNORMAL
MODALITY: ABNORMAL
MONOCYTES # BLD AUTO: 1.02 10*3/MM3 (ref 0.1–0.9)
MONOCYTES NFR BLD AUTO: 6.9 % (ref 0–10)
NEUTROPHILS # BLD AUTO: 12.04 10*3/MM3 (ref 1.4–6.5)
NEUTROPHILS NFR BLD AUTO: 81.5 % (ref 30–70)
OSMOLALITY SERPL CALC.SUM OF ELEC: 268.8 MOSM/KG (ref 273–305)
OSMOLALITY UR: 396 MOSMOL/KG
OXYHGB MFR BLDV: 97.5 % (ref 85–100)
OXYHGB MFR BLDV: 98.7 % (ref 85–100)
PCO2 BLDA: 36.5 MM HG (ref 35–45)
PCO2 BLDA: 38.2 MM HG (ref 35–45)
PCO2 TEMP ADJ BLD: ABNORMAL MM HG (ref 35–48)
PH BLDA: 7.45 PH UNITS (ref 7.35–7.45)
PH BLDA: 7.48 PH UNITS (ref 7.35–7.45)
PH, TEMP CORRECTED: ABNORMAL PH UNITS (ref 7.35–7.45)
PHOSPHATE SERPL-MCNC: 3.3 MG/DL (ref 2.7–4.5)
PHOSPHATE SERPL-MCNC: 3.4 MG/DL (ref 2.7–4.5)
PHOSPHATE SERPL-MCNC: 3.9 MG/DL (ref 2.7–4.5)
PLATELET # BLD AUTO: 438 10*3/MM3 (ref 130–400)
PMV BLD AUTO: 10 FL (ref 6–10)
PO2 BLDA: 133.6 MM HG (ref 80–100)
PO2 BLDA: 293.6 MM HG (ref 80–100)
PO2 TEMP ADJ BLD: ABNORMAL MM HG (ref 83–108)
POTASSIUM BLD-SCNC: 3.3 MMOL/L (ref 3.5–5.3)
PROT SERPL-MCNC: 5.7 G/DL (ref 6–8)
RBC # BLD AUTO: 5.4 10*6/MM3 (ref 4.7–6.1)
SAO2 % BLDCOA: 98.8 % (ref 90–100)
SAO2 % BLDCOA: 99.7 % (ref 90–100)
SET MECH RESP RATE: 24
SODIUM BLD-SCNC: 128 MMOL/L (ref 135–153)
SODIUM BLD-SCNC: 130 MMOL/L (ref 135–153)
SODIUM BLD-SCNC: 134 MMOL/L (ref 135–153)
VENTILATOR MODE: ABNORMAL
VT ON VENT VENT: 380 ML
WBC NRBC COR # BLD: 14.77 10*3/MM3 (ref 4.5–12.5)

## 2018-04-25 PROCEDURE — 31624 DX BRONCHOSCOPE/LAVAGE: CPT | Performed by: INTERNAL MEDICINE

## 2018-04-25 PROCEDURE — 83735 ASSAY OF MAGNESIUM: CPT | Performed by: INTERNAL MEDICINE

## 2018-04-25 PROCEDURE — 25010000002 PROPOFOL 1000 MG/ML EMULSION: Performed by: INTERNAL MEDICINE

## 2018-04-25 PROCEDURE — 85025 COMPLETE CBC W/AUTO DIFF WBC: CPT | Performed by: INTERNAL MEDICINE

## 2018-04-25 PROCEDURE — 93005 ELECTROCARDIOGRAM TRACING: CPT | Performed by: INTERNAL MEDICINE

## 2018-04-25 PROCEDURE — 25010000002 METHYLPREDNISOLONE PER 40 MG: Performed by: INTERNAL MEDICINE

## 2018-04-25 PROCEDURE — 25010000002 PROPOFOL 10 MG/ML EMULSION

## 2018-04-25 PROCEDURE — 94799 UNLISTED PULMONARY SVC/PX: CPT

## 2018-04-25 PROCEDURE — 87205 SMEAR GRAM STAIN: CPT | Performed by: INTERNAL MEDICINE

## 2018-04-25 PROCEDURE — 80053 COMPREHEN METABOLIC PANEL: CPT | Performed by: INTERNAL MEDICINE

## 2018-04-25 PROCEDURE — 71045 X-RAY EXAM CHEST 1 VIEW: CPT | Performed by: RADIOLOGY

## 2018-04-25 PROCEDURE — 83050 HGB METHEMOGLOBIN QUAN: CPT | Performed by: INTERNAL MEDICINE

## 2018-04-25 PROCEDURE — 99233 SBSQ HOSP IP/OBS HIGH 50: CPT | Performed by: HOSPITALIST

## 2018-04-25 PROCEDURE — 31500 INSERT EMERGENCY AIRWAY: CPT | Performed by: INTERNAL MEDICINE

## 2018-04-25 PROCEDURE — 84295 ASSAY OF SERUM SODIUM: CPT | Performed by: INTERNAL MEDICINE

## 2018-04-25 PROCEDURE — 25010000002 EPINEPHRINE PF 1 MG/10ML SOLUTION PREFILLED SYRINGE

## 2018-04-25 PROCEDURE — 82805 BLOOD GASES W/O2 SATURATION: CPT | Performed by: INTERNAL MEDICINE

## 2018-04-25 PROCEDURE — 25010000002 HEPARIN (PORCINE) PER 1000 UNITS: Performed by: INTERNAL MEDICINE

## 2018-04-25 PROCEDURE — 0BH17EZ INSERTION OF ENDOTRACHEAL AIRWAY INTO TRACHEA, VIA NATURAL OR ARTIFICIAL OPENING: ICD-10-PCS | Performed by: INTERNAL MEDICINE

## 2018-04-25 PROCEDURE — 99291 CRITICAL CARE FIRST HOUR: CPT | Performed by: INTERNAL MEDICINE

## 2018-04-25 PROCEDURE — 71045 X-RAY EXAM CHEST 1 VIEW: CPT

## 2018-04-25 PROCEDURE — 0B9F8ZX DRAINAGE OF RIGHT LOWER LUNG LOBE, VIA NATURAL OR ARTIFICIAL OPENING ENDOSCOPIC, DIAGNOSTIC: ICD-10-PCS | Performed by: INTERNAL MEDICINE

## 2018-04-25 PROCEDURE — 25010000002 HYDROCORTISONE SODIUM SUCCINATE 100 MG RECONSTITUTED SOLUTION: Performed by: INTERNAL MEDICINE

## 2018-04-25 PROCEDURE — 0B9J8ZX DRAINAGE OF LEFT LOWER LUNG LOBE, VIA NATURAL OR ARTIFICIAL OPENING ENDOSCOPIC, DIAGNOSTIC: ICD-10-PCS | Performed by: INTERNAL MEDICINE

## 2018-04-25 PROCEDURE — 82962 GLUCOSE BLOOD TEST: CPT

## 2018-04-25 PROCEDURE — 36600 WITHDRAWAL OF ARTERIAL BLOOD: CPT | Performed by: INTERNAL MEDICINE

## 2018-04-25 PROCEDURE — 25010000002 HEPARIN FLUSH (PORCINE) 100 UNIT/ML SOLUTION: Performed by: INTERNAL MEDICINE

## 2018-04-25 PROCEDURE — 82375 ASSAY CARBOXYHB QUANT: CPT | Performed by: INTERNAL MEDICINE

## 2018-04-25 PROCEDURE — 0B9D8ZX DRAINAGE OF RIGHT MIDDLE LUNG LOBE, VIA NATURAL OR ARTIFICIAL OPENING ENDOSCOPIC, DIAGNOSTIC: ICD-10-PCS | Performed by: INTERNAL MEDICINE

## 2018-04-25 PROCEDURE — 63710000001 INSULIN ASPART PER 5 UNITS: Performed by: INTERNAL MEDICINE

## 2018-04-25 PROCEDURE — 25010000002 MIDAZOLAM PER 1 MG

## 2018-04-25 PROCEDURE — 84100 ASSAY OF PHOSPHORUS: CPT | Performed by: INTERNAL MEDICINE

## 2018-04-25 PROCEDURE — 25010000002 LINEZOLID 600 MG/300ML SOLUTION: Performed by: INTERNAL MEDICINE

## 2018-04-25 PROCEDURE — 94002 VENT MGMT INPAT INIT DAY: CPT

## 2018-04-25 PROCEDURE — 63710000001 INSULIN DETEMIR PER 5 UNITS: Performed by: HOSPITALIST

## 2018-04-25 PROCEDURE — 87070 CULTURE OTHR SPECIMN AEROBIC: CPT | Performed by: INTERNAL MEDICINE

## 2018-04-25 RX ORDER — MAGNESIUM HYDROXIDE 1200 MG/15ML
LIQUID ORAL
Status: COMPLETED
Start: 2018-04-25 | End: 2018-04-25

## 2018-04-25 RX ORDER — MAGNESIUM SULFATE HEPTAHYDRATE 40 MG/ML
4 INJECTION, SOLUTION INTRAVENOUS AS NEEDED
Status: DISCONTINUED | OUTPATIENT
Start: 2018-04-25 | End: 2018-05-02 | Stop reason: HOSPADM

## 2018-04-25 RX ORDER — MIDAZOLAM HYDROCHLORIDE 1 MG/ML
INJECTION INTRAMUSCULAR; INTRAVENOUS
Status: COMPLETED
Start: 2018-04-25 | End: 2018-04-25

## 2018-04-25 RX ORDER — MAGNESIUM SULFATE HEPTAHYDRATE 40 MG/ML
2 INJECTION, SOLUTION INTRAVENOUS AS NEEDED
Status: DISCONTINUED | OUTPATIENT
Start: 2018-04-25 | End: 2018-05-02 | Stop reason: HOSPADM

## 2018-04-25 RX ORDER — MIDAZOLAM HYDROCHLORIDE 1 MG/ML
1 INJECTION INTRAMUSCULAR; INTRAVENOUS ONCE
Status: COMPLETED | OUTPATIENT
Start: 2018-04-25 | End: 2018-04-25

## 2018-04-25 RX ORDER — POTASSIUM CHLORIDE 7.45 MG/ML
10 INJECTION INTRAVENOUS
Status: DISCONTINUED | OUTPATIENT
Start: 2018-04-25 | End: 2018-05-02 | Stop reason: HOSPADM

## 2018-04-25 RX ORDER — SODIUM CHLORIDE 9 MG/ML
INJECTION, SOLUTION INTRAVENOUS
Status: COMPLETED
Start: 2018-04-25 | End: 2018-04-25

## 2018-04-25 RX ORDER — POTASSIUM CHLORIDE 1.5 G/1.77G
40 POWDER, FOR SOLUTION ORAL EVERY 4 HOURS
Status: COMPLETED | OUTPATIENT
Start: 2018-04-25 | End: 2018-04-26

## 2018-04-25 RX ORDER — PROPOFOL 10 MG/ML
VIAL (ML) INTRAVENOUS
Status: COMPLETED
Start: 2018-04-25 | End: 2018-04-25

## 2018-04-25 RX ORDER — METHYLPREDNISOLONE SODIUM SUCCINATE 40 MG/ML
20 INJECTION, POWDER, LYOPHILIZED, FOR SOLUTION INTRAMUSCULAR; INTRAVENOUS EVERY 12 HOURS
Status: DISCONTINUED | OUTPATIENT
Start: 2018-04-25 | End: 2018-04-26

## 2018-04-25 RX ORDER — POTASSIUM CHLORIDE 1.5 G/1.77G
40 POWDER, FOR SOLUTION ORAL EVERY 4 HOURS
Status: COMPLETED | OUTPATIENT
Start: 2018-04-25 | End: 2018-04-25

## 2018-04-25 RX ADMIN — EPINEPHRINE 1 MG: 0.1 INJECTION, SOLUTION ENDOTRACHEAL; INTRACARDIAC; INTRAVENOUS at 15:26

## 2018-04-25 RX ADMIN — Medication: at 15:45

## 2018-04-25 RX ADMIN — Medication: at 21:40

## 2018-04-25 RX ADMIN — IPRATROPIUM BROMIDE AND ALBUTEROL SULFATE 3 ML: .5; 3 SOLUTION RESPIRATORY (INHALATION) at 18:52

## 2018-04-25 RX ADMIN — OXCARBAZEPINE 900 MG: 300 TABLET, FILM COATED ORAL at 09:40

## 2018-04-25 RX ADMIN — CHLORHEXIDINE GLUCONATE 15 ML: 1.2 RINSE ORAL at 20:43

## 2018-04-25 RX ADMIN — SODIUM CHLORIDE, PRESERVATIVE FREE 300 UNITS: 5 INJECTION INTRAVENOUS at 20:46

## 2018-04-25 RX ADMIN — PROPOFOL 5 MCG/KG/MIN: 10 INJECTION, EMULSION INTRAVENOUS at 15:40

## 2018-04-25 RX ADMIN — SODIUM HYPOCHLORITE: 1.25 SOLUTION TOPICAL at 20:44

## 2018-04-25 RX ADMIN — METRONIDAZOLE 500 MG: 500 INJECTION, SOLUTION INTRAVENOUS at 01:31

## 2018-04-25 RX ADMIN — METRONIDAZOLE 500 MG: 500 INJECTION, SOLUTION INTRAVENOUS at 19:27

## 2018-04-25 RX ADMIN — PANTOPRAZOLE SODIUM 40 MG: 40 INJECTION, POWDER, FOR SOLUTION INTRAVENOUS at 05:37

## 2018-04-25 RX ADMIN — Medication 10 ML: at 20:43

## 2018-04-25 RX ADMIN — INSULIN ASPART 7 UNITS: 100 INJECTION, SOLUTION INTRAVENOUS; SUBCUTANEOUS at 05:40

## 2018-04-25 RX ADMIN — LEVETIRACETAM 750 MG: 100 SOLUTION ORAL at 11:53

## 2018-04-25 RX ADMIN — SODIUM HYPOCHLORITE: 1.25 SOLUTION TOPICAL at 09:43

## 2018-04-25 RX ADMIN — POTASSIUM CHLORIDE 40 MEQ: 1.5 POWDER, FOR SOLUTION ORAL at 09:41

## 2018-04-25 RX ADMIN — LEVOTHYROXINE SODIUM 75 MCG: 75 TABLET ORAL at 05:37

## 2018-04-25 RX ADMIN — NOREPINEPHRINE BITARTRATE 0.06 MCG/KG/MIN: 1 INJECTION INTRAVENOUS at 15:30

## 2018-04-25 RX ADMIN — LINEZOLID 600 MG: 600 INJECTION, SOLUTION INTRAVENOUS at 21:48

## 2018-04-25 RX ADMIN — SODIUM CHLORIDE 1000 ML: 900 IRRIGANT IRRIGATION at 16:48

## 2018-04-25 RX ADMIN — DANTROLENE SODIUM 25 MG: 25 CAPSULE ORAL at 09:40

## 2018-04-25 RX ADMIN — METHYLPREDNISOLONE SODIUM SUCCINATE 20 MG: 40 INJECTION, POWDER, FOR SOLUTION INTRAMUSCULAR; INTRAVENOUS at 19:22

## 2018-04-25 RX ADMIN — CASTOR OIL AND BALSAM, PERU: 788; 87 OINTMENT TOPICAL at 20:44

## 2018-04-25 RX ADMIN — IPRATROPIUM BROMIDE AND ALBUTEROL SULFATE 3 ML: .5; 3 SOLUTION RESPIRATORY (INHALATION) at 01:16

## 2018-04-25 RX ADMIN — OXCARBAZEPINE 900 MG: 300 TABLET, FILM COATED ORAL at 20:45

## 2018-04-25 RX ADMIN — ATORVASTATIN CALCIUM 10 MG: 10 TABLET, FILM COATED ORAL at 20:45

## 2018-04-25 RX ADMIN — SODIUM CHLORIDE 1000 ML: 9 INJECTION, SOLUTION INTRAVENOUS at 15:45

## 2018-04-25 RX ADMIN — MIDAZOLAM HYDROCHLORIDE 1 MG: 1 INJECTION INTRAMUSCULAR; INTRAVENOUS at 16:46

## 2018-04-25 RX ADMIN — HEPARIN SODIUM 5000 UNITS: 5000 INJECTION, SOLUTION INTRAVENOUS; SUBCUTANEOUS at 09:41

## 2018-04-25 RX ADMIN — SODIUM CHLORIDE, PRESERVATIVE FREE 300 UNITS: 5 INJECTION INTRAVENOUS at 09:44

## 2018-04-25 RX ADMIN — POTASSIUM CHLORIDE 40 MEQ: 1.5 POWDER, FOR SOLUTION ORAL at 13:00

## 2018-04-25 RX ADMIN — FINASTERIDE 5 MG: 5 TABLET, FILM COATED ORAL at 09:40

## 2018-04-25 RX ADMIN — LINEZOLID 600 MG: 600 INJECTION, SOLUTION INTRAVENOUS at 09:52

## 2018-04-25 RX ADMIN — BACLOFEN 20 MG: 10 TABLET ORAL at 20:45

## 2018-04-25 RX ADMIN — LEVETIRACETAM 750 MG: 100 SOLUTION ORAL at 20:46

## 2018-04-25 RX ADMIN — IPRATROPIUM BROMIDE AND ALBUTEROL SULFATE 3 ML: .5; 3 SOLUTION RESPIRATORY (INHALATION) at 06:50

## 2018-04-25 RX ADMIN — DANTROLENE SODIUM 25 MG: 25 CAPSULE ORAL at 20:45

## 2018-04-25 RX ADMIN — HYDROCORTISONE SODIUM SUCCINATE 50 MG: 100 INJECTION, POWDER, FOR SOLUTION INTRAMUSCULAR; INTRAVENOUS at 09:41

## 2018-04-25 RX ADMIN — HEPARIN SODIUM 5000 UNITS: 5000 INJECTION, SOLUTION INTRAVENOUS; SUBCUTANEOUS at 20:45

## 2018-04-25 RX ADMIN — INSULIN DETEMIR 15 UNITS: 100 INJECTION, SOLUTION SUBCUTANEOUS at 09:40

## 2018-04-25 RX ADMIN — IPRATROPIUM BROMIDE AND ALBUTEROL SULFATE 3 ML: .5; 3 SOLUTION RESPIRATORY (INHALATION) at 12:49

## 2018-04-25 RX ADMIN — METRONIDAZOLE 500 MG: 500 INJECTION, SOLUTION INTRAVENOUS at 09:53

## 2018-04-25 RX ADMIN — POTASSIUM CHLORIDE 40 MEQ: 1.5 POWDER, FOR SOLUTION ORAL at 20:45

## 2018-04-25 RX ADMIN — MIDAZOLAM HYDROCHLORIDE 1 MG: 1 INJECTION, SOLUTION INTRAMUSCULAR; INTRAVENOUS at 16:46

## 2018-04-25 RX ADMIN — PROPOFOL 50 MCG/KG/MIN: 10 INJECTION, EMULSION INTRAVENOUS at 19:28

## 2018-04-25 RX ADMIN — CASTOR OIL AND BALSAM, PERU: 788; 87 OINTMENT TOPICAL at 09:43

## 2018-04-25 RX ADMIN — CHLORHEXIDINE GLUCONATE 15 ML: 1.2 RINSE ORAL at 11:54

## 2018-04-25 RX ADMIN — Medication 1 CAPSULE: at 09:40

## 2018-04-25 RX ADMIN — Medication 10 ML: at 09:45

## 2018-04-25 RX ADMIN — ACETYLCYSTEINE 3 ML: 200 SOLUTION ORAL; RESPIRATORY (INHALATION) at 18:53

## 2018-04-25 RX ADMIN — PROPOFOL 50 MCG/KG/MIN: 10 INJECTION, EMULSION INTRAVENOUS at 20:47

## 2018-04-26 ENCOUNTER — APPOINTMENT (OUTPATIENT)
Dept: GENERAL RADIOLOGY | Facility: HOSPITAL | Age: 43
End: 2018-04-26

## 2018-04-26 LAB
A-A DO2: 250.8 MMHG (ref 0–300)
ANION GAP SERPL CALCULATED.3IONS-SCNC: 12.5 MMOL/L (ref 3.6–11.2)
ANISOCYTOSIS BLD QL: ABNORMAL
ARTERIAL PATENCY WRIST A: ABNORMAL
ATMOSPHERIC PRESS: 724 MMHG
BACTERIA SPEC AEROBE CULT: NORMAL
BASE EXCESS BLDA CALC-SCNC: 3.7 MMOL/L
BDY SITE: ABNORMAL
BODY TEMPERATURE: 98.6 C
BUN BLD-MCNC: 13 MG/DL (ref 7–21)
BUN/CREAT SERPL: 28.3 (ref 7–25)
CALCIUM SPEC-SCNC: 8.7 MG/DL (ref 7.7–10)
CHLORIDE SERPL-SCNC: 97 MMOL/L (ref 99–112)
CO2 SERPL-SCNC: 23.5 MMOL/L (ref 24.3–31.9)
COHGB MFR BLD: 1.1 % (ref 0–5)
CREAT BLD-MCNC: 0.46 MG/DL (ref 0.43–1.29)
CRP SERPL-MCNC: 0.83 MG/DL (ref 0–0.99)
DEPRECATED RDW RBC AUTO: 50.4 FL (ref 37–54)
ERYTHROCYTE [DISTWIDTH] IN BLOOD BY AUTOMATED COUNT: 17.6 % (ref 11.5–14.5)
GFR SERPL CREATININE-BSD FRML MDRD: >150 ML/MIN/1.73
GLUCOSE BLD-MCNC: 145 MG/DL (ref 70–110)
GLUCOSE BLDC GLUCOMTR-MCNC: 133 MG/DL (ref 70–130)
GLUCOSE BLDC GLUCOMTR-MCNC: 155 MG/DL (ref 70–130)
GLUCOSE BLDC GLUCOMTR-MCNC: 213 MG/DL (ref 70–130)
GLUCOSE BLDC GLUCOMTR-MCNC: 248 MG/DL (ref 70–130)
HCO3 BLDA-SCNC: 29.4 MMOL/L (ref 22–26)
HCT VFR BLD AUTO: 42.6 % (ref 42–52)
HCT VFR BLD CALC: 41 % (ref 42–52)
HGB BLD-MCNC: 14 G/DL (ref 14–18)
HGB BLDA-MCNC: 13.8 G/DL (ref 12–16)
HOROWITZ INDEX BLD+IHG-RTO: 60 %
HYPOCHROMIA BLD QL: ABNORMAL
LYMPHOCYTES # BLD MANUAL: 6.79 10*3/MM3 (ref 1–3)
LYMPHOCYTES NFR BLD MANUAL: 11 % (ref 0–10)
LYMPHOCYTES NFR BLD MANUAL: 29 % (ref 21–51)
MAGNESIUM SERPL-MCNC: 2 MG/DL (ref 1.7–2.6)
MCH RBC QN AUTO: 26.4 PG (ref 27–33)
MCHC RBC AUTO-ENTMCNC: 32.9 G/DL (ref 33–37)
MCV RBC AUTO: 80.2 FL (ref 80–94)
METAMYELOCYTES NFR BLD MANUAL: 1 % (ref 0–0)
METHGB BLD QL: 0.3 % (ref 0–3)
MODALITY: ABNORMAL
MONOCYTES # BLD AUTO: 2.58 10*3/MM3 (ref 0.1–0.9)
NEUTROPHILS # BLD AUTO: 13.82 10*3/MM3 (ref 1.4–6.5)
NEUTROPHILS NFR BLD MANUAL: 57 % (ref 30–70)
NEUTS BAND NFR BLD MANUAL: 2 % (ref 4–12)
OSMOLALITY SERPL CALC.SUM OF ELEC: 269.1 MOSM/KG (ref 273–305)
OXYHGB MFR BLDV: 96.7 % (ref 85–100)
PCO2 BLDA: 48.6 MM HG (ref 35–45)
PEEP RESPIRATORY: 8 CM[H2O]
PH BLDA: 7.4 PH UNITS (ref 7.35–7.45)
PHOSPHATE SERPL-MCNC: 2.9 MG/DL (ref 2.7–4.5)
PHOSPHATE SERPL-MCNC: 3.1 MG/DL (ref 2.7–4.5)
PHOSPHATE SERPL-MCNC: 3.5 MG/DL (ref 2.7–4.5)
PLAT MORPH BLD: NORMAL
PLATELET # BLD AUTO: 547 10*3/MM3 (ref 130–400)
PMV BLD AUTO: 10.3 FL (ref 6–10)
PO2 BLDA: 101.9 MM HG (ref 80–100)
POTASSIUM BLD-SCNC: 4.3 MMOL/L (ref 3.5–5.3)
RBC # BLD AUTO: 5.31 10*6/MM3 (ref 4.7–6.1)
SAO2 % BLDCOA: 98.1 % (ref 90–100)
SCAN SLIDE: NORMAL
SET MECH RESP RATE: 18
SODIUM BLD-SCNC: 133 MMOL/L (ref 135–153)
SODIUM BLD-SCNC: 136 MMOL/L (ref 135–153)
VENTILATOR MODE: AC
VT ON VENT VENT: 380 ML
WBC NRBC COR # BLD: 23.42 10*3/MM3 (ref 4.5–12.5)

## 2018-04-26 PROCEDURE — 94799 UNLISTED PULMONARY SVC/PX: CPT

## 2018-04-26 PROCEDURE — 84100 ASSAY OF PHOSPHORUS: CPT | Performed by: INTERNAL MEDICINE

## 2018-04-26 PROCEDURE — 82805 BLOOD GASES W/O2 SATURATION: CPT | Performed by: HOSPITALIST

## 2018-04-26 PROCEDURE — 94003 VENT MGMT INPAT SUBQ DAY: CPT

## 2018-04-26 PROCEDURE — 63710000001 PREDNISONE PER 1 MG: Performed by: INTERNAL MEDICINE

## 2018-04-26 PROCEDURE — 85007 BL SMEAR W/DIFF WBC COUNT: CPT | Performed by: INTERNAL MEDICINE

## 2018-04-26 PROCEDURE — 25010000002 LINEZOLID 600 MG/300ML SOLUTION: Performed by: INTERNAL MEDICINE

## 2018-04-26 PROCEDURE — 25010000002 METHYLPREDNISOLONE PER 40 MG: Performed by: INTERNAL MEDICINE

## 2018-04-26 PROCEDURE — 85025 COMPLETE CBC W/AUTO DIFF WBC: CPT | Performed by: INTERNAL MEDICINE

## 2018-04-26 PROCEDURE — 83050 HGB METHEMOGLOBIN QUAN: CPT | Performed by: HOSPITALIST

## 2018-04-26 PROCEDURE — 25010000002 HEPARIN FLUSH (PORCINE) 100 UNIT/ML SOLUTION: Performed by: INTERNAL MEDICINE

## 2018-04-26 PROCEDURE — 25010000002 PROPOFOL 1000 MG/ML EMULSION: Performed by: INTERNAL MEDICINE

## 2018-04-26 PROCEDURE — 83735 ASSAY OF MAGNESIUM: CPT | Performed by: INTERNAL MEDICINE

## 2018-04-26 PROCEDURE — 82375 ASSAY CARBOXYHB QUANT: CPT | Performed by: HOSPITALIST

## 2018-04-26 PROCEDURE — 36600 WITHDRAWAL OF ARTERIAL BLOOD: CPT | Performed by: HOSPITALIST

## 2018-04-26 PROCEDURE — 87040 BLOOD CULTURE FOR BACTERIA: CPT | Performed by: NURSE PRACTITIONER

## 2018-04-26 PROCEDURE — 71045 X-RAY EXAM CHEST 1 VIEW: CPT

## 2018-04-26 PROCEDURE — 71045 X-RAY EXAM CHEST 1 VIEW: CPT | Performed by: RADIOLOGY

## 2018-04-26 PROCEDURE — 99232 SBSQ HOSP IP/OBS MODERATE 35: CPT | Performed by: HOSPITALIST

## 2018-04-26 PROCEDURE — 82962 GLUCOSE BLOOD TEST: CPT

## 2018-04-26 PROCEDURE — 25010000002 HEPARIN (PORCINE) PER 1000 UNITS: Performed by: INTERNAL MEDICINE

## 2018-04-26 PROCEDURE — 99291 CRITICAL CARE FIRST HOUR: CPT | Performed by: INTERNAL MEDICINE

## 2018-04-26 PROCEDURE — 80048 BASIC METABOLIC PNL TOTAL CA: CPT | Performed by: HOSPITALIST

## 2018-04-26 PROCEDURE — 63710000001 INSULIN ASPART PER 5 UNITS: Performed by: INTERNAL MEDICINE

## 2018-04-26 PROCEDURE — 84295 ASSAY OF SERUM SODIUM: CPT | Performed by: INTERNAL MEDICINE

## 2018-04-26 PROCEDURE — 86140 C-REACTIVE PROTEIN: CPT | Performed by: PHYSICIAN ASSISTANT

## 2018-04-26 RX ORDER — PREDNISONE 20 MG/1
20 TABLET ORAL
Status: DISCONTINUED | OUTPATIENT
Start: 2018-04-26 | End: 2018-04-30

## 2018-04-26 RX ADMIN — PROPOFOL 45 MCG/KG/MIN: 10 INJECTION, EMULSION INTRAVENOUS at 08:12

## 2018-04-26 RX ADMIN — FINASTERIDE 5 MG: 5 TABLET, FILM COATED ORAL at 08:08

## 2018-04-26 RX ADMIN — LINEZOLID 600 MG: 600 INJECTION, SOLUTION INTRAVENOUS at 21:02

## 2018-04-26 RX ADMIN — PROPOFOL 20 MCG/KG/MIN: 10 INJECTION, EMULSION INTRAVENOUS at 21:04

## 2018-04-26 RX ADMIN — PREDNISONE 20 MG: 20 TABLET ORAL at 12:49

## 2018-04-26 RX ADMIN — CHLORHEXIDINE GLUCONATE 15 ML: 1.2 RINSE ORAL at 08:08

## 2018-04-26 RX ADMIN — IPRATROPIUM BROMIDE AND ALBUTEROL SULFATE 3 ML: .5; 3 SOLUTION RESPIRATORY (INHALATION) at 06:49

## 2018-04-26 RX ADMIN — Medication: at 21:02

## 2018-04-26 RX ADMIN — ACETAMINOPHEN 650 MG: 650 SOLUTION ORAL at 07:44

## 2018-04-26 RX ADMIN — HEPARIN SODIUM 5000 UNITS: 5000 INJECTION, SOLUTION INTRAVENOUS; SUBCUTANEOUS at 08:06

## 2018-04-26 RX ADMIN — METRONIDAZOLE 500 MG: 500 INJECTION, SOLUTION INTRAVENOUS at 17:20

## 2018-04-26 RX ADMIN — HEPARIN SODIUM 5000 UNITS: 5000 INJECTION, SOLUTION INTRAVENOUS; SUBCUTANEOUS at 21:03

## 2018-04-26 RX ADMIN — Medication 10 ML: at 21:05

## 2018-04-26 RX ADMIN — NOREPINEPHRINE BITARTRATE 0.2 MCG/KG/MIN: 1 INJECTION INTRAVENOUS at 07:36

## 2018-04-26 RX ADMIN — INSULIN ASPART 4 UNITS: 100 INJECTION, SOLUTION INTRAVENOUS; SUBCUTANEOUS at 11:40

## 2018-04-26 RX ADMIN — SODIUM HYPOCHLORITE: 1.25 SOLUTION TOPICAL at 21:04

## 2018-04-26 RX ADMIN — OXCARBAZEPINE 900 MG: 300 TABLET, FILM COATED ORAL at 21:03

## 2018-04-26 RX ADMIN — Medication 1 CAPSULE: at 08:08

## 2018-04-26 RX ADMIN — ACETAMINOPHEN 649.6 MG: 650 SOLUTION ORAL at 17:30

## 2018-04-26 RX ADMIN — METRONIDAZOLE 500 MG: 500 INJECTION, SOLUTION INTRAVENOUS at 11:48

## 2018-04-26 RX ADMIN — PROPOFOL 25 MCG/KG/MIN: 10 INJECTION, EMULSION INTRAVENOUS at 17:19

## 2018-04-26 RX ADMIN — LEVETIRACETAM 750 MG: 100 SOLUTION ORAL at 21:04

## 2018-04-26 RX ADMIN — IPRATROPIUM BROMIDE AND ALBUTEROL SULFATE 3 ML: .5; 3 SOLUTION RESPIRATORY (INHALATION) at 19:14

## 2018-04-26 RX ADMIN — METHYLPREDNISOLONE SODIUM SUCCINATE 20 MG: 40 INJECTION, POWDER, FOR SOLUTION INTRAMUSCULAR; INTRAVENOUS at 05:55

## 2018-04-26 RX ADMIN — Medication: at 13:05

## 2018-04-26 RX ADMIN — SODIUM HYPOCHLORITE: 1.25 SOLUTION TOPICAL at 08:05

## 2018-04-26 RX ADMIN — BACLOFEN 20 MG: 10 TABLET ORAL at 17:19

## 2018-04-26 RX ADMIN — DEXMEDETOMIDINE HYDROCHLORIDE 0.5 MCG/KG/HR: 100 INJECTION, SOLUTION INTRAVENOUS at 21:00

## 2018-04-26 RX ADMIN — SODIUM CHLORIDE, PRESERVATIVE FREE 300 UNITS: 5 INJECTION INTRAVENOUS at 21:02

## 2018-04-26 RX ADMIN — DANTROLENE SODIUM 25 MG: 25 CAPSULE ORAL at 08:08

## 2018-04-26 RX ADMIN — Medication 10 ML: at 08:09

## 2018-04-26 RX ADMIN — PROPOFOL 45 MCG/KG/MIN: 10 INJECTION, EMULSION INTRAVENOUS at 01:48

## 2018-04-26 RX ADMIN — IPRATROPIUM BROMIDE AND ALBUTEROL SULFATE 3 ML: .5; 3 SOLUTION RESPIRATORY (INHALATION) at 12:33

## 2018-04-26 RX ADMIN — CASTOR OIL AND BALSAM, PERU: 788; 87 OINTMENT TOPICAL at 08:06

## 2018-04-26 RX ADMIN — SODIUM CHLORIDE, PRESERVATIVE FREE 300 UNITS: 5 INJECTION INTRAVENOUS at 08:09

## 2018-04-26 RX ADMIN — CHLORHEXIDINE GLUCONATE 15 ML: 1.2 RINSE ORAL at 21:04

## 2018-04-26 RX ADMIN — ACETYLCYSTEINE 3 ML: 200 SOLUTION ORAL; RESPIRATORY (INHALATION) at 19:14

## 2018-04-26 RX ADMIN — INSULIN ASPART 4 UNITS: 100 INJECTION, SOLUTION INTRAVENOUS; SUBCUTANEOUS at 21:00

## 2018-04-26 RX ADMIN — LEVETIRACETAM 750 MG: 100 SOLUTION ORAL at 08:10

## 2018-04-26 RX ADMIN — ATORVASTATIN CALCIUM 10 MG: 10 TABLET, FILM COATED ORAL at 21:03

## 2018-04-26 RX ADMIN — CASTOR OIL AND BALSAM, PERU: 788; 87 OINTMENT TOPICAL at 21:05

## 2018-04-26 RX ADMIN — IPRATROPIUM BROMIDE AND ALBUTEROL SULFATE 3 ML: .5; 3 SOLUTION RESPIRATORY (INHALATION) at 00:53

## 2018-04-26 RX ADMIN — LEVOTHYROXINE SODIUM 75 MCG: 75 TABLET ORAL at 06:00

## 2018-04-26 RX ADMIN — BACLOFEN 20 MG: 10 TABLET ORAL at 21:03

## 2018-04-26 RX ADMIN — DANTROLENE SODIUM 25 MG: 25 CAPSULE ORAL at 21:03

## 2018-04-26 RX ADMIN — LINEZOLID 600 MG: 600 INJECTION, SOLUTION INTRAVENOUS at 11:26

## 2018-04-26 RX ADMIN — Medication: at 02:46

## 2018-04-26 RX ADMIN — Medication: at 08:01

## 2018-04-26 RX ADMIN — METRONIDAZOLE 500 MG: 500 INJECTION, SOLUTION INTRAVENOUS at 01:48

## 2018-04-26 RX ADMIN — DEXMEDETOMIDINE HYDROCHLORIDE 0.2 MCG/KG/HR: 100 INJECTION, SOLUTION INTRAVENOUS at 08:47

## 2018-04-26 RX ADMIN — ACETYLCYSTEINE 4 ML: 200 SOLUTION ORAL; RESPIRATORY (INHALATION) at 06:49

## 2018-04-26 RX ADMIN — OXCARBAZEPINE 900 MG: 300 TABLET, FILM COATED ORAL at 08:08

## 2018-04-26 RX ADMIN — PANTOPRAZOLE SODIUM 40 MG: 40 INJECTION, POWDER, FOR SOLUTION INTRAVENOUS at 05:55

## 2018-04-26 RX ADMIN — POTASSIUM CHLORIDE 40 MEQ: 1.5 POWDER, FOR SOLUTION ORAL at 01:47

## 2018-04-27 ENCOUNTER — ANESTHESIA EVENT (OUTPATIENT)
Dept: PERIOP | Facility: HOSPITAL | Age: 43
End: 2018-04-27

## 2018-04-27 ENCOUNTER — ANESTHESIA (OUTPATIENT)
Dept: PERIOP | Facility: HOSPITAL | Age: 43
End: 2018-04-27

## 2018-04-27 LAB
A-A DO2: 118.4 MMHG (ref 0–300)
ANION GAP SERPL CALCULATED.3IONS-SCNC: 9.9 MMOL/L (ref 3.6–11.2)
ARTERIAL PATENCY WRIST A: POSITIVE
ATMOSPHERIC PRESS: 724 MMHG
BACTERIA SPEC AEROBE CULT: NORMAL
BASE EXCESS BLDA CALC-SCNC: 6.6 MMOL/L
BASOPHILS # BLD AUTO: 0.04 10*3/MM3 (ref 0–0.3)
BASOPHILS NFR BLD AUTO: 0.3 % (ref 0–2)
BDY SITE: ABNORMAL
BILIRUB UR QL STRIP: NEGATIVE
BODY TEMPERATURE: 98.6 C
BUN BLD-MCNC: 8 MG/DL (ref 7–21)
BUN/CREAT SERPL: 20 (ref 7–25)
CALCIUM SPEC-SCNC: 8.5 MG/DL (ref 7.7–10)
CHLORIDE SERPL-SCNC: 95 MMOL/L (ref 99–112)
CLARITY UR: ABNORMAL
CO2 SERPL-SCNC: 29.1 MMOL/L (ref 24.3–31.9)
COHGB MFR BLD: 1.2 % (ref 0–5)
COLOR UR: ABNORMAL
CREAT BLD-MCNC: 0.4 MG/DL (ref 0.43–1.29)
CRP SERPL-MCNC: 8.51 MG/DL (ref 0–0.99)
DEPRECATED RDW RBC AUTO: 52.4 FL (ref 37–54)
EOSINOPHIL # BLD AUTO: 0 10*3/MM3 (ref 0–0.7)
EOSINOPHIL NFR BLD AUTO: 0 % (ref 0–5)
ERYTHROCYTE [DISTWIDTH] IN BLOOD BY AUTOMATED COUNT: 18.2 % (ref 11.5–14.5)
GFR SERPL CREATININE-BSD FRML MDRD: >150 ML/MIN/1.73
GLUCOSE BLD-MCNC: 176 MG/DL (ref 70–110)
GLUCOSE BLDC GLUCOMTR-MCNC: 106 MG/DL (ref 70–130)
GLUCOSE BLDC GLUCOMTR-MCNC: 150 MG/DL (ref 70–130)
GLUCOSE UR STRIP-MCNC: NEGATIVE MG/DL
HCO3 BLDA-SCNC: 30.1 MMOL/L (ref 22–26)
HCT VFR BLD AUTO: 39.7 % (ref 42–52)
HCT VFR BLD CALC: 39 % (ref 42–52)
HGB BLD-MCNC: 12.8 G/DL (ref 14–18)
HGB BLDA-MCNC: 13.1 G/DL (ref 12–16)
HGB UR QL STRIP.AUTO: NEGATIVE
HOROWITZ INDEX BLD+IHG-RTO: 40 %
IMM GRANULOCYTES # BLD: 0.29 10*3/MM3 (ref 0–0.03)
IMM GRANULOCYTES NFR BLD: 2.5 % (ref 0–0.5)
KETONES UR QL STRIP: NEGATIVE
LEUKOCYTE ESTERASE UR QL STRIP.AUTO: NEGATIVE
LYMPHOCYTES # BLD AUTO: 1.97 10*3/MM3 (ref 1–3)
LYMPHOCYTES NFR BLD AUTO: 16.7 % (ref 21–51)
MAGNESIUM SERPL-MCNC: 1.9 MG/DL (ref 1.7–2.6)
MAGNESIUM SERPL-MCNC: 2.4 MG/DL (ref 1.7–2.6)
MAGNESIUM SERPL-MCNC: 2.5 MG/DL (ref 1.7–2.6)
MCH RBC QN AUTO: 26.4 PG (ref 27–33)
MCHC RBC AUTO-ENTMCNC: 32.2 G/DL (ref 33–37)
MCV RBC AUTO: 81.9 FL (ref 80–94)
METHGB BLD QL: 0.4 % (ref 0–3)
MODALITY: ABNORMAL
MONOCYTES # BLD AUTO: 1.29 10*3/MM3 (ref 0.1–0.9)
MONOCYTES NFR BLD AUTO: 11 % (ref 0–10)
NEUTROPHILS # BLD AUTO: 8.19 10*3/MM3 (ref 1.4–6.5)
NEUTROPHILS NFR BLD AUTO: 69.5 % (ref 30–70)
NITRITE UR QL STRIP: NEGATIVE
OSMOLALITY SERPL CALC.SUM OF ELEC: 270.9 MOSM/KG (ref 273–305)
OXYHGB MFR BLDV: 96.8 % (ref 85–100)
PCO2 BLDA: 38.8 MM HG (ref 35–45)
PEEP RESPIRATORY: 8 CM[H2O]
PH BLDA: 7.51 PH UNITS (ref 7.35–7.45)
PH UR STRIP.AUTO: 5.5 [PH] (ref 5–8)
PHOSPHATE SERPL-MCNC: 3.2 MG/DL (ref 2.7–4.5)
PHOSPHATE SERPL-MCNC: 3.3 MG/DL (ref 2.7–4.5)
PHOSPHATE SERPL-MCNC: 3.7 MG/DL (ref 2.7–4.5)
PLATELET # BLD AUTO: 488 10*3/MM3 (ref 130–400)
PMV BLD AUTO: 9.3 FL (ref 6–10)
PO2 BLDA: 107.8 MM HG (ref 80–100)
POTASSIUM BLD-SCNC: 4.5 MMOL/L (ref 3.5–5.3)
PROT UR QL STRIP: NEGATIVE
RBC # BLD AUTO: 4.85 10*6/MM3 (ref 4.7–6.1)
SAO2 % BLDCOA: 98.4 % (ref 90–100)
SET MECH RESP RATE: 18
SODIUM BLD-SCNC: 134 MMOL/L (ref 135–153)
SP GR UR STRIP: >1.03 (ref 1–1.03)
UROBILINOGEN UR QL STRIP: ABNORMAL
VENTILATOR MODE: ABNORMAL
VT ON VENT VENT: 380 ML
WBC NRBC COR # BLD: 11.78 10*3/MM3 (ref 4.5–12.5)

## 2018-04-27 PROCEDURE — 25010000002 PHENYLEPHRINE PER 1 ML: Performed by: NURSE ANESTHETIST, CERTIFIED REGISTERED

## 2018-04-27 PROCEDURE — 0DH63UZ INSERTION OF FEEDING DEVICE INTO STOMACH, PERCUTANEOUS APPROACH: ICD-10-PCS | Performed by: SURGERY

## 2018-04-27 PROCEDURE — 84100 ASSAY OF PHOSPHORUS: CPT | Performed by: INTERNAL MEDICINE

## 2018-04-27 PROCEDURE — 83050 HGB METHEMOGLOBIN QUAN: CPT | Performed by: INTERNAL MEDICINE

## 2018-04-27 PROCEDURE — 0B110F4 BYPASS TRACHEA TO CUTANEOUS WITH TRACHEOSTOMY DEVICE, OPEN APPROACH: ICD-10-PCS | Performed by: SURGERY

## 2018-04-27 PROCEDURE — 99222 1ST HOSP IP/OBS MODERATE 55: CPT | Performed by: SURGERY

## 2018-04-27 PROCEDURE — 25010000002 HEPARIN FLUSH (PORCINE) 100 UNIT/ML SOLUTION: Performed by: INTERNAL MEDICINE

## 2018-04-27 PROCEDURE — 83735 ASSAY OF MAGNESIUM: CPT | Performed by: INTERNAL MEDICINE

## 2018-04-27 PROCEDURE — 25010000002 PROPOFOL 1000 MG/ML EMULSION: Performed by: INTERNAL MEDICINE

## 2018-04-27 PROCEDURE — 25010000002 LINEZOLID 600 MG/300ML SOLUTION: Performed by: INTERNAL MEDICINE

## 2018-04-27 PROCEDURE — 99291 CRITICAL CARE FIRST HOUR: CPT | Performed by: INTERNAL MEDICINE

## 2018-04-27 PROCEDURE — 80048 BASIC METABOLIC PNL TOTAL CA: CPT | Performed by: HOSPITALIST

## 2018-04-27 PROCEDURE — 36600 WITHDRAWAL OF ARTERIAL BLOOD: CPT | Performed by: INTERNAL MEDICINE

## 2018-04-27 PROCEDURE — 94799 UNLISTED PULMONARY SVC/PX: CPT

## 2018-04-27 PROCEDURE — 63710000001 PREDNISONE PER 1 MG: Performed by: INTERNAL MEDICINE

## 2018-04-27 PROCEDURE — 43246 EGD PLACE GASTROSTOMY TUBE: CPT | Performed by: SURGERY

## 2018-04-27 PROCEDURE — 82962 GLUCOSE BLOOD TEST: CPT

## 2018-04-27 PROCEDURE — 82805 BLOOD GASES W/O2 SATURATION: CPT | Performed by: INTERNAL MEDICINE

## 2018-04-27 PROCEDURE — 31600 PLANNED TRACHEOSTOMY: CPT | Performed by: SURGERY

## 2018-04-27 PROCEDURE — 81003 URINALYSIS AUTO W/O SCOPE: CPT | Performed by: INTERNAL MEDICINE

## 2018-04-27 PROCEDURE — 63710000001 INSULIN DETEMIR PER 5 UNITS: Performed by: HOSPITALIST

## 2018-04-27 PROCEDURE — 99232 SBSQ HOSP IP/OBS MODERATE 35: CPT | Performed by: HOSPITALIST

## 2018-04-27 PROCEDURE — 86140 C-REACTIVE PROTEIN: CPT | Performed by: NURSE PRACTITIONER

## 2018-04-27 PROCEDURE — 82375 ASSAY CARBOXYHB QUANT: CPT | Performed by: INTERNAL MEDICINE

## 2018-04-27 PROCEDURE — 94003 VENT MGMT INPAT SUBQ DAY: CPT

## 2018-04-27 PROCEDURE — 85025 COMPLETE CBC W/AUTO DIFF WBC: CPT | Performed by: INTERNAL MEDICINE

## 2018-04-27 PROCEDURE — 25010000002 HEPARIN (PORCINE) PER 1000 UNITS: Performed by: INTERNAL MEDICINE

## 2018-04-27 RX ORDER — MAGNESIUM HYDROXIDE 1200 MG/15ML
LIQUID ORAL AS NEEDED
Status: DISCONTINUED | OUTPATIENT
Start: 2018-04-27 | End: 2018-04-27 | Stop reason: HOSPADM

## 2018-04-27 RX ORDER — MAGNESIUM SULFATE HEPTAHYDRATE 40 MG/ML
4 INJECTION, SOLUTION INTRAVENOUS ONCE
Status: COMPLETED | OUTPATIENT
Start: 2018-04-27 | End: 2018-04-27

## 2018-04-27 RX ORDER — BUPIVACAINE HYDROCHLORIDE AND EPINEPHRINE 5; 5 MG/ML; UG/ML
INJECTION, SOLUTION EPIDURAL; INTRACAUDAL; PERINEURAL AS NEEDED
Status: DISCONTINUED | OUTPATIENT
Start: 2018-04-27 | End: 2018-04-27 | Stop reason: HOSPADM

## 2018-04-27 RX ORDER — SODIUM CHLORIDE, SODIUM LACTATE, POTASSIUM CHLORIDE, CALCIUM CHLORIDE 600; 310; 30; 20 MG/100ML; MG/100ML; MG/100ML; MG/100ML
100 INJECTION, SOLUTION INTRAVENOUS CONTINUOUS
Status: CANCELLED | OUTPATIENT
Start: 2018-04-27

## 2018-04-27 RX ORDER — SODIUM CHLORIDE 9 MG/ML
INJECTION, SOLUTION INTRAVENOUS CONTINUOUS PRN
Status: DISCONTINUED | OUTPATIENT
Start: 2018-04-27 | End: 2018-04-27 | Stop reason: SURG

## 2018-04-27 RX ORDER — VECURONIUM BROMIDE 1 MG/ML
INJECTION, POWDER, LYOPHILIZED, FOR SOLUTION INTRAVENOUS AS NEEDED
Status: DISCONTINUED | OUTPATIENT
Start: 2018-04-27 | End: 2018-04-27 | Stop reason: SURG

## 2018-04-27 RX ORDER — ESMOLOL HYDROCHLORIDE 10 MG/ML
INJECTION INTRAVENOUS AS NEEDED
Status: DISCONTINUED | OUTPATIENT
Start: 2018-04-27 | End: 2018-04-27 | Stop reason: SURG

## 2018-04-27 RX ADMIN — CASTOR OIL AND BALSAM, PERU: 788; 87 OINTMENT TOPICAL at 21:26

## 2018-04-27 RX ADMIN — Medication: at 19:16

## 2018-04-27 RX ADMIN — CHLORHEXIDINE GLUCONATE 15 ML: 1.2 RINSE ORAL at 21:25

## 2018-04-27 RX ADMIN — Medication 1 CAPSULE: at 08:12

## 2018-04-27 RX ADMIN — DEXMEDETOMIDINE HYDROCHLORIDE 1 MCG/KG/HR: 100 INJECTION, SOLUTION INTRAVENOUS at 05:25

## 2018-04-27 RX ADMIN — METRONIDAZOLE 500 MG: 500 INJECTION, SOLUTION INTRAVENOUS at 17:33

## 2018-04-27 RX ADMIN — CASTOR OIL AND BALSAM, PERU: 788; 87 OINTMENT TOPICAL at 08:12

## 2018-04-27 RX ADMIN — METRONIDAZOLE 500 MG: 500 INJECTION, SOLUTION INTRAVENOUS at 10:02

## 2018-04-27 RX ADMIN — ATORVASTATIN CALCIUM 10 MG: 10 TABLET, FILM COATED ORAL at 21:25

## 2018-04-27 RX ADMIN — SODIUM CHLORIDE: 9 INJECTION, SOLUTION INTRAVENOUS at 12:42

## 2018-04-27 RX ADMIN — BACLOFEN 20 MG: 10 TABLET ORAL at 21:24

## 2018-04-27 RX ADMIN — LEVETIRACETAM 750 MG: 100 SOLUTION ORAL at 08:12

## 2018-04-27 RX ADMIN — LINEZOLID 600 MG: 600 INJECTION, SOLUTION INTRAVENOUS at 10:02

## 2018-04-27 RX ADMIN — FINASTERIDE 5 MG: 5 TABLET, FILM COATED ORAL at 08:12

## 2018-04-27 RX ADMIN — SODIUM CHLORIDE, PRESERVATIVE FREE 300 UNITS: 5 INJECTION INTRAVENOUS at 08:12

## 2018-04-27 RX ADMIN — OXCARBAZEPINE 900 MG: 300 TABLET, FILM COATED ORAL at 08:12

## 2018-04-27 RX ADMIN — ACETYLCYSTEINE 3 ML: 200 SOLUTION ORAL; RESPIRATORY (INHALATION) at 07:27

## 2018-04-27 RX ADMIN — NOREPINEPHRINE BITARTRATE 0.02 MCG/KG/MIN: 1 INJECTION INTRAVENOUS at 05:26

## 2018-04-27 RX ADMIN — SODIUM HYPOCHLORITE: 1.25 SOLUTION TOPICAL at 21:26

## 2018-04-27 RX ADMIN — IPRATROPIUM BROMIDE AND ALBUTEROL SULFATE 3 ML: .5; 3 SOLUTION RESPIRATORY (INHALATION) at 00:39

## 2018-04-27 RX ADMIN — IPRATROPIUM BROMIDE AND ALBUTEROL SULFATE 3 ML: .5; 3 SOLUTION RESPIRATORY (INHALATION) at 19:35

## 2018-04-27 RX ADMIN — PROPOFOL 10 MCG/KG/MIN: 10 INJECTION, EMULSION INTRAVENOUS at 17:48

## 2018-04-27 RX ADMIN — Medication 10 ML: at 21:25

## 2018-04-27 RX ADMIN — CHLORHEXIDINE GLUCONATE 15 ML: 1.2 RINSE ORAL at 08:13

## 2018-04-27 RX ADMIN — DANTROLENE SODIUM 25 MG: 25 CAPSULE ORAL at 21:25

## 2018-04-27 RX ADMIN — VECURONIUM BROMIDE 5 MG: 1 INJECTION, POWDER, LYOPHILIZED, FOR SOLUTION INTRAVENOUS at 12:30

## 2018-04-27 RX ADMIN — SODIUM CHLORIDE, PRESERVATIVE FREE 300 UNITS: 5 INJECTION INTRAVENOUS at 21:23

## 2018-04-27 RX ADMIN — HEPARIN SODIUM 5000 UNITS: 5000 INJECTION, SOLUTION INTRAVENOUS; SUBCUTANEOUS at 08:12

## 2018-04-27 RX ADMIN — LINEZOLID 600 MG: 600 INJECTION, SOLUTION INTRAVENOUS at 21:25

## 2018-04-27 RX ADMIN — HEPARIN SODIUM 5000 UNITS: 5000 INJECTION, SOLUTION INTRAVENOUS; SUBCUTANEOUS at 21:24

## 2018-04-27 RX ADMIN — Medication: at 06:25

## 2018-04-27 RX ADMIN — LEVOTHYROXINE SODIUM 75 MCG: 75 TABLET ORAL at 05:24

## 2018-04-27 RX ADMIN — PROPOFOL 10 MCG/KG/MIN: 10 INJECTION, EMULSION INTRAVENOUS at 12:20

## 2018-04-27 RX ADMIN — PROPOFOL 10 MCG/KG/MIN: 10 INJECTION, EMULSION INTRAVENOUS at 05:25

## 2018-04-27 RX ADMIN — ACETAMINOPHEN 650 MG: 650 SOLUTION ORAL at 03:06

## 2018-04-27 RX ADMIN — INSULIN DETEMIR 15 UNITS: 100 INJECTION, SOLUTION SUBCUTANEOUS at 08:13

## 2018-04-27 RX ADMIN — LEVETIRACETAM 750 MG: 100 SOLUTION ORAL at 21:24

## 2018-04-27 RX ADMIN — OXCARBAZEPINE 900 MG: 300 TABLET, FILM COATED ORAL at 21:24

## 2018-04-27 RX ADMIN — IPRATROPIUM BROMIDE AND ALBUTEROL SULFATE 3 ML: .5; 3 SOLUTION RESPIRATORY (INHALATION) at 07:27

## 2018-04-27 RX ADMIN — PANTOPRAZOLE SODIUM 40 MG: 40 INJECTION, POWDER, FOR SOLUTION INTRAVENOUS at 05:26

## 2018-04-27 RX ADMIN — SODIUM HYPOCHLORITE: 1.25 SOLUTION TOPICAL at 08:12

## 2018-04-27 RX ADMIN — METRONIDAZOLE 500 MG: 500 INJECTION, SOLUTION INTRAVENOUS at 02:31

## 2018-04-27 RX ADMIN — DANTROLENE SODIUM 25 MG: 25 CAPSULE ORAL at 08:11

## 2018-04-27 RX ADMIN — PHENYLEPHRINE HYDROCHLORIDE 100 MCG: 10 INJECTION, SOLUTION INTRAMUSCULAR; INTRAVENOUS; SUBCUTANEOUS at 12:49

## 2018-04-27 RX ADMIN — MAGNESIUM SULFATE HEPTAHYDRATE 4 G: 40 INJECTION, SOLUTION INTRAVENOUS at 06:17

## 2018-04-27 RX ADMIN — ESMOLOL HYDROCHLORIDE 10 MG: 10 INJECTION, SOLUTION INTRAVENOUS at 13:00

## 2018-04-27 RX ADMIN — PREDNISONE 20 MG: 20 TABLET ORAL at 08:11

## 2018-04-27 NOTE — ANESTHESIA POSTPROCEDURE EVALUATION
Patient: Dragan Camara    Procedure Summary     Date:  04/27/18 Room / Location:  [facility] OR 04 /  [facility] OR    Anesthesia Start:  1226 Anesthesia Stop:  1322    Procedure:  TRACHEOSTOMY AND PERCUTANEOUS ENDOSCOPIC GASTROSTOMY TUBE INSERTION (N/A Neck) Diagnosis:       Acute respiratory failure with hypoxia      (Acute respiratory failure with hypoxia [J96.01])    Surgeon:  Hussain Stringer MD Provider:  Danny Rodriguez MD    Anesthesia Type:  general ASA Status:  3          Anesthesia Type: general  Last vitals  BP   138/90 (04/27/18 1200)   Temp   99.6 °F (37.6 °C) (04/27/18 1330)   Pulse   106 (04/27/18 1330)   Resp   18 (04/27/18 1330)     SpO2   97 % (04/27/18 1330)     Post Anesthesia Care and Evaluation    Patient location during evaluation: bedside  Patient participation: complete - patient cannot participate  Post-procedure mental status: sedated.  Pain score: 1  Pain management: adequate  Airway patency: patent  Anesthetic complications: No anesthetic complications  PONV Status: controlled  Cardiovascular status: acceptable  Respiratory status: acceptable  Hydration status: acceptable

## 2018-04-27 NOTE — ANESTHESIA PREPROCEDURE EVALUATION
Anesthesia Evaluation     Patient summary reviewed and Nursing notes reviewed   no history of anesthetic complications:  NPO Solid Status: > 8 hours  NPO Liquid Status: > 8 hours           Airway   Comment: Orally intubated  Dental      Pulmonary - normal exam   (+) pneumonia (respiratory failure with sepsis) stable ,   Cardiovascular - negative cardio ROS and normal exam      ROS comment: Anemia, thrombocytopenia    Neuro/Psych  (+) seizures,       ROS Comment: Hx cerebral palsy  GI/Hepatic/Renal/Endo    (+)   hypothyroidism,     Musculoskeletal         ROS comment: Sacral decubitus ulcer  Abdominal  - normal exam   Substance History - negative use     OB/GYN negative ob/gyn ROS         Other   (+) arthritis                   Anesthesia Plan    ASA 3     general     Anesthetic plan and risks discussed with patient, father and mother.    Plan discussed with CRNA.

## 2018-04-28 ENCOUNTER — APPOINTMENT (OUTPATIENT)
Dept: GENERAL RADIOLOGY | Facility: HOSPITAL | Age: 43
End: 2018-04-28

## 2018-04-28 LAB
ANION GAP SERPL CALCULATED.3IONS-SCNC: 10.2 MMOL/L (ref 3.6–11.2)
BACTERIA SPEC RESP CULT: NO GROWTH
BACTERIA SPEC RESP CULT: NO GROWTH
BASOPHILS # BLD AUTO: 0.08 10*3/MM3 (ref 0–0.3)
BASOPHILS NFR BLD AUTO: 0.5 % (ref 0–2)
BUN BLD-MCNC: 6 MG/DL (ref 7–21)
BUN/CREAT SERPL: 18.2 (ref 7–25)
CALCIUM SPEC-SCNC: 8.5 MG/DL (ref 7.7–10)
CHLORIDE SERPL-SCNC: 93 MMOL/L (ref 99–112)
CO2 SERPL-SCNC: 28.8 MMOL/L (ref 24.3–31.9)
CREAT BLD-MCNC: 0.33 MG/DL (ref 0.43–1.29)
CRP SERPL-MCNC: 3.83 MG/DL (ref 0–0.99)
DEPRECATED RDW RBC AUTO: 52.2 FL (ref 37–54)
EOSINOPHIL # BLD AUTO: 0.25 10*3/MM3 (ref 0–0.7)
EOSINOPHIL NFR BLD AUTO: 1.5 % (ref 0–5)
ERYTHROCYTE [DISTWIDTH] IN BLOOD BY AUTOMATED COUNT: 18.1 % (ref 11.5–14.5)
GFR SERPL CREATININE-BSD FRML MDRD: >150 ML/MIN/1.73
GLUCOSE BLD-MCNC: 129 MG/DL (ref 70–110)
GLUCOSE BLDC GLUCOMTR-MCNC: 102 MG/DL (ref 70–130)
GLUCOSE BLDC GLUCOMTR-MCNC: 118 MG/DL (ref 70–130)
GRAM STN SPEC: NORMAL
HCT VFR BLD AUTO: 38.5 % (ref 42–52)
HGB BLD-MCNC: 12.4 G/DL (ref 14–18)
IMM GRANULOCYTES # BLD: 0.34 10*3/MM3 (ref 0–0.03)
IMM GRANULOCYTES NFR BLD: 2.1 % (ref 0–0.5)
LYMPHOCYTES # BLD AUTO: 4.34 10*3/MM3 (ref 1–3)
LYMPHOCYTES NFR BLD AUTO: 26.8 % (ref 21–51)
MAGNESIUM SERPL-MCNC: 1.8 MG/DL (ref 1.7–2.6)
MAGNESIUM SERPL-MCNC: 2.1 MG/DL (ref 1.7–2.6)
MAGNESIUM SERPL-MCNC: 2.2 MG/DL (ref 1.7–2.6)
MCH RBC QN AUTO: 26.3 PG (ref 27–33)
MCHC RBC AUTO-ENTMCNC: 32.2 G/DL (ref 33–37)
MCV RBC AUTO: 81.7 FL (ref 80–94)
MONOCYTES # BLD AUTO: 1.58 10*3/MM3 (ref 0.1–0.9)
MONOCYTES NFR BLD AUTO: 9.7 % (ref 0–10)
NEUTROPHILS # BLD AUTO: 9.62 10*3/MM3 (ref 1.4–6.5)
NEUTROPHILS NFR BLD AUTO: 59.4 % (ref 30–70)
OSMOLALITY SERPL CALC.SUM OF ELEC: 263.8 MOSM/KG (ref 273–305)
PHOSPHATE SERPL-MCNC: 3.6 MG/DL (ref 2.7–4.5)
PHOSPHATE SERPL-MCNC: 3.6 MG/DL (ref 2.7–4.5)
PHOSPHATE SERPL-MCNC: 3.9 MG/DL (ref 2.7–4.5)
PLATELET # BLD AUTO: 408 10*3/MM3 (ref 130–400)
PMV BLD AUTO: 9.3 FL (ref 6–10)
POTASSIUM BLD-SCNC: 3.5 MMOL/L (ref 3.5–5.3)
RBC # BLD AUTO: 4.71 10*6/MM3 (ref 4.7–6.1)
SODIUM BLD-SCNC: 132 MMOL/L (ref 135–153)
WBC NRBC COR # BLD: 16.21 10*3/MM3 (ref 4.5–12.5)

## 2018-04-28 PROCEDURE — 63710000001 INSULIN DETEMIR PER 5 UNITS: Performed by: HOSPITALIST

## 2018-04-28 PROCEDURE — 94799 UNLISTED PULMONARY SVC/PX: CPT

## 2018-04-28 PROCEDURE — 25010000002 HEPARIN FLUSH (PORCINE) 100 UNIT/ML SOLUTION: Performed by: INTERNAL MEDICINE

## 2018-04-28 PROCEDURE — 83735 ASSAY OF MAGNESIUM: CPT | Performed by: INTERNAL MEDICINE

## 2018-04-28 PROCEDURE — 63710000001 PREDNISONE PER 1 MG: Performed by: INTERNAL MEDICINE

## 2018-04-28 PROCEDURE — 25010000002 HEPARIN (PORCINE) PER 1000 UNITS: Performed by: INTERNAL MEDICINE

## 2018-04-28 PROCEDURE — 94003 VENT MGMT INPAT SUBQ DAY: CPT

## 2018-04-28 PROCEDURE — 84100 ASSAY OF PHOSPHORUS: CPT | Performed by: INTERNAL MEDICINE

## 2018-04-28 PROCEDURE — 85025 COMPLETE CBC W/AUTO DIFF WBC: CPT | Performed by: INTERNAL MEDICINE

## 2018-04-28 PROCEDURE — 99024 POSTOP FOLLOW-UP VISIT: CPT | Performed by: SURGERY

## 2018-04-28 PROCEDURE — 25010000002 LINEZOLID 600 MG/300ML SOLUTION: Performed by: INTERNAL MEDICINE

## 2018-04-28 PROCEDURE — 86140 C-REACTIVE PROTEIN: CPT | Performed by: NURSE PRACTITIONER

## 2018-04-28 PROCEDURE — 71045 X-RAY EXAM CHEST 1 VIEW: CPT | Performed by: RADIOLOGY

## 2018-04-28 PROCEDURE — 71045 X-RAY EXAM CHEST 1 VIEW: CPT

## 2018-04-28 PROCEDURE — 80048 BASIC METABOLIC PNL TOTAL CA: CPT | Performed by: HOSPITALIST

## 2018-04-28 PROCEDURE — 82962 GLUCOSE BLOOD TEST: CPT

## 2018-04-28 PROCEDURE — 99232 SBSQ HOSP IP/OBS MODERATE 35: CPT | Performed by: HOSPITALIST

## 2018-04-28 RX ORDER — MAGNESIUM SULFATE HEPTAHYDRATE 40 MG/ML
4 INJECTION, SOLUTION INTRAVENOUS ONCE
Status: COMPLETED | OUTPATIENT
Start: 2018-04-28 | End: 2018-04-29

## 2018-04-28 RX ORDER — POTASSIUM CHLORIDE 1.5 G/1.77G
40 POWDER, FOR SOLUTION ORAL EVERY 4 HOURS
Status: DISPENSED | OUTPATIENT
Start: 2018-04-28 | End: 2018-04-28

## 2018-04-28 RX ADMIN — LINEZOLID 600 MG: 600 INJECTION, SOLUTION INTRAVENOUS at 11:21

## 2018-04-28 RX ADMIN — POTASSIUM CHLORIDE 40 MEQ: 1.5 POWDER, FOR SOLUTION ORAL at 18:33

## 2018-04-28 RX ADMIN — SODIUM CHLORIDE, PRESERVATIVE FREE 300 UNITS: 5 INJECTION INTRAVENOUS at 21:00

## 2018-04-28 RX ADMIN — LINEZOLID 600 MG: 600 INJECTION, SOLUTION INTRAVENOUS at 21:08

## 2018-04-28 RX ADMIN — Medication 1 CAPSULE: at 08:34

## 2018-04-28 RX ADMIN — DANTROLENE SODIUM 25 MG: 25 CAPSULE ORAL at 08:34

## 2018-04-28 RX ADMIN — SODIUM HYPOCHLORITE: 1.25 SOLUTION TOPICAL at 11:29

## 2018-04-28 RX ADMIN — LEVOTHYROXINE SODIUM 75 MCG: 75 TABLET ORAL at 08:34

## 2018-04-28 RX ADMIN — CASTOR OIL AND BALSAM, PERU: 788; 87 OINTMENT TOPICAL at 11:29

## 2018-04-28 RX ADMIN — Medication 10 ML: at 21:33

## 2018-04-28 RX ADMIN — OXCARBAZEPINE 900 MG: 300 TABLET, FILM COATED ORAL at 20:59

## 2018-04-28 RX ADMIN — PREDNISONE 20 MG: 20 TABLET ORAL at 08:34

## 2018-04-28 RX ADMIN — ACETYLCYSTEINE 3 ML: 200 SOLUTION ORAL; RESPIRATORY (INHALATION) at 00:55

## 2018-04-28 RX ADMIN — PANTOPRAZOLE SODIUM 40 MG: 40 INJECTION, POWDER, FOR SOLUTION INTRAVENOUS at 05:06

## 2018-04-28 RX ADMIN — FINASTERIDE 5 MG: 5 TABLET, FILM COATED ORAL at 08:34

## 2018-04-28 RX ADMIN — LEVETIRACETAM 750 MG: 100 SOLUTION ORAL at 08:34

## 2018-04-28 RX ADMIN — MAGNESIUM SULFATE HEPTAHYDRATE 4 G: 40 INJECTION, SOLUTION INTRAVENOUS at 21:08

## 2018-04-28 RX ADMIN — METRONIDAZOLE 500 MG: 500 INJECTION, SOLUTION INTRAVENOUS at 11:21

## 2018-04-28 RX ADMIN — DANTROLENE SODIUM 25 MG: 25 CAPSULE ORAL at 20:59

## 2018-04-28 RX ADMIN — METRONIDAZOLE 500 MG: 500 INJECTION, SOLUTION INTRAVENOUS at 02:10

## 2018-04-28 RX ADMIN — IPRATROPIUM BROMIDE AND ALBUTEROL SULFATE 3 ML: .5; 3 SOLUTION RESPIRATORY (INHALATION) at 19:20

## 2018-04-28 RX ADMIN — HEPARIN SODIUM 5000 UNITS: 5000 INJECTION, SOLUTION INTRAVENOUS; SUBCUTANEOUS at 08:34

## 2018-04-28 RX ADMIN — ACETYLCYSTEINE 3 ML: 200 SOLUTION ORAL; RESPIRATORY (INHALATION) at 19:30

## 2018-04-28 RX ADMIN — OXCARBAZEPINE 900 MG: 300 TABLET, FILM COATED ORAL at 08:34

## 2018-04-28 RX ADMIN — SODIUM HYPOCHLORITE: 1.25 SOLUTION TOPICAL at 21:00

## 2018-04-28 RX ADMIN — CHLORHEXIDINE GLUCONATE 15 ML: 1.2 RINSE ORAL at 11:30

## 2018-04-28 RX ADMIN — IPRATROPIUM BROMIDE AND ALBUTEROL SULFATE 3 ML: .5; 3 SOLUTION RESPIRATORY (INHALATION) at 12:34

## 2018-04-28 RX ADMIN — INSULIN DETEMIR 15 UNITS: 100 INJECTION, SOLUTION SUBCUTANEOUS at 08:35

## 2018-04-28 RX ADMIN — IPRATROPIUM BROMIDE AND ALBUTEROL SULFATE 3 ML: .5; 3 SOLUTION RESPIRATORY (INHALATION) at 00:55

## 2018-04-28 RX ADMIN — BACLOFEN 20 MG: 10 TABLET ORAL at 20:59

## 2018-04-28 RX ADMIN — CASTOR OIL AND BALSAM, PERU: 788; 87 OINTMENT TOPICAL at 21:00

## 2018-04-28 RX ADMIN — BACLOFEN 20 MG: 10 TABLET ORAL at 18:33

## 2018-04-28 RX ADMIN — POTASSIUM CHLORIDE 40 MEQ: 1.5 POWDER, FOR SOLUTION ORAL at 11:29

## 2018-04-28 RX ADMIN — NOREPINEPHRINE BITARTRATE 0.02 MCG/KG/MIN: 1 INJECTION INTRAVENOUS at 16:49

## 2018-04-28 RX ADMIN — CHLORHEXIDINE GLUCONATE 15 ML: 1.2 RINSE ORAL at 20:59

## 2018-04-28 RX ADMIN — IPRATROPIUM BROMIDE AND ALBUTEROL SULFATE 3 ML: .5; 3 SOLUTION RESPIRATORY (INHALATION) at 06:59

## 2018-04-28 RX ADMIN — ATORVASTATIN CALCIUM 10 MG: 10 TABLET, FILM COATED ORAL at 22:04

## 2018-04-28 RX ADMIN — ACETYLCYSTEINE 3 ML: 200 SOLUTION ORAL; RESPIRATORY (INHALATION) at 06:59

## 2018-04-28 RX ADMIN — HEPARIN SODIUM 5000 UNITS: 5000 INJECTION, SOLUTION INTRAVENOUS; SUBCUTANEOUS at 20:59

## 2018-04-28 RX ADMIN — LEVETIRACETAM 750 MG: 100 SOLUTION ORAL at 20:58

## 2018-04-28 RX ADMIN — METRONIDAZOLE 500 MG: 500 INJECTION, SOLUTION INTRAVENOUS at 18:33

## 2018-04-29 ENCOUNTER — APPOINTMENT (OUTPATIENT)
Dept: GENERAL RADIOLOGY | Facility: HOSPITAL | Age: 43
End: 2018-04-29

## 2018-04-29 LAB
027 TOXIN: NORMAL
A-A DO2: 156.8 MMHG (ref 0–300)
ANION GAP SERPL CALCULATED.3IONS-SCNC: 8.8 MMOL/L (ref 3.6–11.2)
ARTERIAL PATENCY WRIST A: POSITIVE
ATMOSPHERIC PRESS: 732 MMHG
BASE EXCESS BLDA CALC-SCNC: 2.3 MMOL/L
BASOPHILS # BLD AUTO: 0.08 10*3/MM3 (ref 0–0.3)
BASOPHILS NFR BLD AUTO: 0.5 % (ref 0–2)
BDY SITE: ABNORMAL
BODY TEMPERATURE: 98.6 C
BUN BLD-MCNC: 6 MG/DL (ref 7–21)
BUN/CREAT SERPL: 18.2 (ref 7–25)
C DIFF TOX GENS STL QL NAA+PROBE: NEGATIVE
CALCIUM SPEC-SCNC: 8.1 MG/DL (ref 7.7–10)
CHLORIDE SERPL-SCNC: 93 MMOL/L (ref 99–112)
CO2 SERPL-SCNC: 27.2 MMOL/L (ref 24.3–31.9)
COHGB MFR BLD: 0.8 % (ref 0–5)
CREAT BLD-MCNC: 0.33 MG/DL (ref 0.43–1.29)
CRP SERPL-MCNC: 5.03 MG/DL (ref 0–0.99)
DEPRECATED RDW RBC AUTO: 53.2 FL (ref 37–54)
EOSINOPHIL # BLD AUTO: 0.37 10*3/MM3 (ref 0–0.7)
EOSINOPHIL NFR BLD AUTO: 2.2 % (ref 0–5)
ERYTHROCYTE [DISTWIDTH] IN BLOOD BY AUTOMATED COUNT: 18.3 % (ref 11.5–14.5)
GFR SERPL CREATININE-BSD FRML MDRD: >150 ML/MIN/1.73
GLUCOSE BLD-MCNC: 159 MG/DL (ref 70–110)
GLUCOSE BLDC GLUCOMTR-MCNC: 142 MG/DL (ref 70–130)
GLUCOSE BLDC GLUCOMTR-MCNC: 204 MG/DL (ref 70–130)
GLUCOSE BLDC GLUCOMTR-MCNC: 95 MG/DL (ref 70–130)
GLUCOSE BLDC GLUCOMTR-MCNC: 96 MG/DL (ref 70–130)
HCO3 BLDA-SCNC: 25.7 MMOL/L (ref 22–26)
HCT VFR BLD AUTO: 37.6 % (ref 42–52)
HCT VFR BLD CALC: 35 % (ref 42–52)
HGB BLD-MCNC: 12.1 G/DL (ref 14–18)
HGB BLDA-MCNC: 11.8 G/DL (ref 12–16)
HOROWITZ INDEX BLD+IHG-RTO: 40 %
IMM GRANULOCYTES # BLD: 0.26 10*3/MM3 (ref 0–0.03)
IMM GRANULOCYTES NFR BLD: 1.6 % (ref 0–0.5)
LYMPHOCYTES # BLD AUTO: 4.14 10*3/MM3 (ref 1–3)
LYMPHOCYTES NFR BLD AUTO: 24.8 % (ref 21–51)
MAGNESIUM SERPL-MCNC: 2.2 MG/DL (ref 1.7–2.6)
MAGNESIUM SERPL-MCNC: 2.5 MG/DL (ref 1.7–2.6)
MAGNESIUM SERPL-MCNC: 3 MG/DL (ref 1.7–2.6)
MCH RBC QN AUTO: 26.5 PG (ref 27–33)
MCHC RBC AUTO-ENTMCNC: 32.2 G/DL (ref 33–37)
MCV RBC AUTO: 82.5 FL (ref 80–94)
METHGB BLD QL: 0.2 % (ref 0–3)
MODALITY: ABNORMAL
MONOCYTES # BLD AUTO: 1.43 10*3/MM3 (ref 0.1–0.9)
MONOCYTES NFR BLD AUTO: 8.6 % (ref 0–10)
NEUTROPHILS # BLD AUTO: 10.41 10*3/MM3 (ref 1.4–6.5)
NEUTROPHILS NFR BLD AUTO: 62.3 % (ref 30–70)
OSMOLALITY SERPL CALC.SUM OF ELEC: 259.9 MOSM/KG (ref 273–305)
OXYHGB MFR BLDV: 94.3 % (ref 85–100)
PCO2 BLDA: 35.8 MM HG (ref 35–45)
PEEP RESPIRATORY: 8 CM[H2O]
PH BLDA: 7.47 PH UNITS (ref 7.35–7.45)
PHOSPHATE SERPL-MCNC: 3.4 MG/DL (ref 2.7–4.5)
PHOSPHATE SERPL-MCNC: 3.5 MG/DL (ref 2.7–4.5)
PHOSPHATE SERPL-MCNC: 4 MG/DL (ref 2.7–4.5)
PLATELET # BLD AUTO: 425 10*3/MM3 (ref 130–400)
PMV BLD AUTO: 9.6 FL (ref 6–10)
PO2 BLDA: 76 MM HG (ref 80–100)
POTASSIUM BLD-SCNC: 4.5 MMOL/L (ref 3.5–5.3)
PSV: 8 CMH2O
RBC # BLD AUTO: 4.56 10*6/MM3 (ref 4.7–6.1)
SAO2 % BLDCOA: 95.3 % (ref 90–100)
SODIUM BLD-SCNC: 129 MMOL/L (ref 135–153)
VENTILATOR MODE: ABNORMAL
WBC NRBC COR # BLD: 16.69 10*3/MM3 (ref 4.5–12.5)

## 2018-04-29 PROCEDURE — 83735 ASSAY OF MAGNESIUM: CPT | Performed by: INTERNAL MEDICINE

## 2018-04-29 PROCEDURE — 82375 ASSAY CARBOXYHB QUANT: CPT | Performed by: HOSPITALIST

## 2018-04-29 PROCEDURE — 86140 C-REACTIVE PROTEIN: CPT | Performed by: NURSE PRACTITIONER

## 2018-04-29 PROCEDURE — 94003 VENT MGMT INPAT SUBQ DAY: CPT

## 2018-04-29 PROCEDURE — 84100 ASSAY OF PHOSPHORUS: CPT | Performed by: INTERNAL MEDICINE

## 2018-04-29 PROCEDURE — 25010000002 HEPARIN (PORCINE) PER 1000 UNITS: Performed by: INTERNAL MEDICINE

## 2018-04-29 PROCEDURE — 63710000001 INSULIN DETEMIR PER 5 UNITS: Performed by: HOSPITALIST

## 2018-04-29 PROCEDURE — 74018 RADEX ABDOMEN 1 VIEW: CPT | Performed by: RADIOLOGY

## 2018-04-29 PROCEDURE — 74018 RADEX ABDOMEN 1 VIEW: CPT

## 2018-04-29 PROCEDURE — 94799 UNLISTED PULMONARY SVC/PX: CPT

## 2018-04-29 PROCEDURE — 85025 COMPLETE CBC W/AUTO DIFF WBC: CPT | Performed by: INTERNAL MEDICINE

## 2018-04-29 PROCEDURE — 83050 HGB METHEMOGLOBIN QUAN: CPT | Performed by: HOSPITALIST

## 2018-04-29 PROCEDURE — 36600 WITHDRAWAL OF ARTERIAL BLOOD: CPT | Performed by: HOSPITALIST

## 2018-04-29 PROCEDURE — 87493 C DIFF AMPLIFIED PROBE: CPT | Performed by: INTERNAL MEDICINE

## 2018-04-29 PROCEDURE — 82805 BLOOD GASES W/O2 SATURATION: CPT | Performed by: HOSPITALIST

## 2018-04-29 PROCEDURE — 25010000002 HEPARIN FLUSH (PORCINE) 100 UNIT/ML SOLUTION: Performed by: INTERNAL MEDICINE

## 2018-04-29 PROCEDURE — 80048 BASIC METABOLIC PNL TOTAL CA: CPT | Performed by: HOSPITALIST

## 2018-04-29 PROCEDURE — 82962 GLUCOSE BLOOD TEST: CPT

## 2018-04-29 PROCEDURE — 63710000001 INSULIN ASPART PER 5 UNITS: Performed by: INTERNAL MEDICINE

## 2018-04-29 PROCEDURE — 99233 SBSQ HOSP IP/OBS HIGH 50: CPT | Performed by: HOSPITALIST

## 2018-04-29 PROCEDURE — 63710000001 PREDNISONE PER 1 MG: Performed by: INTERNAL MEDICINE

## 2018-04-29 RX ORDER — HYOSCYAMINE SULFATE EXTENDED-RELEASE 0.38 MG/1
375 TABLET ORAL EVERY 12 HOURS
Status: DISCONTINUED | OUTPATIENT
Start: 2018-04-29 | End: 2018-05-01

## 2018-04-29 RX ADMIN — ACETYLCYSTEINE 3 ML: 200 SOLUTION ORAL; RESPIRATORY (INHALATION) at 18:09

## 2018-04-29 RX ADMIN — HYOSCYAMINE SULFATE 375 MCG: 0.38 TABLET, EXTENDED RELEASE ORAL at 12:00

## 2018-04-29 RX ADMIN — PREDNISONE 20 MG: 20 TABLET ORAL at 08:36

## 2018-04-29 RX ADMIN — BACLOFEN 20 MG: 10 TABLET ORAL at 20:48

## 2018-04-29 RX ADMIN — ATORVASTATIN CALCIUM 10 MG: 10 TABLET, FILM COATED ORAL at 20:48

## 2018-04-29 RX ADMIN — METRONIDAZOLE 500 MG: 500 INJECTION, SOLUTION INTRAVENOUS at 03:00

## 2018-04-29 RX ADMIN — SODIUM CHLORIDE, PRESERVATIVE FREE 300 UNITS: 5 INJECTION INTRAVENOUS at 20:49

## 2018-04-29 RX ADMIN — OXCARBAZEPINE 900 MG: 300 TABLET, FILM COATED ORAL at 08:36

## 2018-04-29 RX ADMIN — FINASTERIDE 5 MG: 5 TABLET, FILM COATED ORAL at 08:36

## 2018-04-29 RX ADMIN — BACLOFEN 20 MG: 10 TABLET ORAL at 18:09

## 2018-04-29 RX ADMIN — HEPARIN SODIUM 5000 UNITS: 5000 INJECTION, SOLUTION INTRAVENOUS; SUBCUTANEOUS at 20:48

## 2018-04-29 RX ADMIN — CHLORHEXIDINE GLUCONATE 15 ML: 1.2 RINSE ORAL at 20:18

## 2018-04-29 RX ADMIN — CHLORHEXIDINE GLUCONATE 15 ML: 1.2 RINSE ORAL at 08:35

## 2018-04-29 RX ADMIN — INSULIN ASPART 4 UNITS: 100 INJECTION, SOLUTION INTRAVENOUS; SUBCUTANEOUS at 12:28

## 2018-04-29 RX ADMIN — CASTOR OIL AND BALSAM, PERU: 788; 87 OINTMENT TOPICAL at 08:38

## 2018-04-29 RX ADMIN — HEPARIN SODIUM 5000 UNITS: 5000 INJECTION, SOLUTION INTRAVENOUS; SUBCUTANEOUS at 08:36

## 2018-04-29 RX ADMIN — Medication 10 ML: at 20:50

## 2018-04-29 RX ADMIN — Medication 10 ML: at 08:35

## 2018-04-29 RX ADMIN — DANTROLENE SODIUM 25 MG: 25 CAPSULE ORAL at 08:36

## 2018-04-29 RX ADMIN — IPRATROPIUM BROMIDE AND ALBUTEROL SULFATE 3 ML: .5; 3 SOLUTION RESPIRATORY (INHALATION) at 00:00

## 2018-04-29 RX ADMIN — INSULIN DETEMIR 15 UNITS: 100 INJECTION, SOLUTION SUBCUTANEOUS at 08:38

## 2018-04-29 RX ADMIN — SODIUM HYPOCHLORITE: 1.25 SOLUTION TOPICAL at 08:37

## 2018-04-29 RX ADMIN — SODIUM HYPOCHLORITE: 1.25 SOLUTION TOPICAL at 20:19

## 2018-04-29 RX ADMIN — LEVETIRACETAM 750 MG: 100 SOLUTION ORAL at 20:49

## 2018-04-29 RX ADMIN — DANTROLENE SODIUM 25 MG: 25 CAPSULE ORAL at 20:48

## 2018-04-29 RX ADMIN — LEVETIRACETAM 750 MG: 100 SOLUTION ORAL at 11:00

## 2018-04-29 RX ADMIN — PANTOPRAZOLE SODIUM 40 MG: 40 INJECTION, POWDER, FOR SOLUTION INTRAVENOUS at 06:08

## 2018-04-29 RX ADMIN — HYOSCYAMINE SULFATE 375 MCG: 0.38 TABLET, EXTENDED RELEASE ORAL at 22:25

## 2018-04-29 RX ADMIN — LEVOTHYROXINE SODIUM 75 MCG: 75 TABLET ORAL at 06:08

## 2018-04-29 RX ADMIN — CASTOR OIL AND BALSAM, PERU: 788; 87 OINTMENT TOPICAL at 20:17

## 2018-04-29 RX ADMIN — Medication 1 CAPSULE: at 08:36

## 2018-04-29 RX ADMIN — SODIUM CHLORIDE, PRESERVATIVE FREE 300 UNITS: 5 INJECTION INTRAVENOUS at 10:00

## 2018-04-29 RX ADMIN — IPRATROPIUM BROMIDE AND ALBUTEROL SULFATE 3 ML: .5; 3 SOLUTION RESPIRATORY (INHALATION) at 06:30

## 2018-04-29 RX ADMIN — ACETYLCYSTEINE 3 ML: 200 SOLUTION ORAL; RESPIRATORY (INHALATION) at 06:30

## 2018-04-29 RX ADMIN — IPRATROPIUM BROMIDE AND ALBUTEROL SULFATE 3 ML: .5; 3 SOLUTION RESPIRATORY (INHALATION) at 12:20

## 2018-04-29 RX ADMIN — IPRATROPIUM BROMIDE AND ALBUTEROL SULFATE 3 ML: .5; 3 SOLUTION RESPIRATORY (INHALATION) at 18:08

## 2018-04-29 RX ADMIN — METRONIDAZOLE 500 MG: 500 INJECTION, SOLUTION INTRAVENOUS at 10:10

## 2018-04-29 RX ADMIN — METRONIDAZOLE 500 MG: 500 INJECTION, SOLUTION INTRAVENOUS at 18:09

## 2018-04-29 RX ADMIN — OXCARBAZEPINE 900 MG: 300 TABLET, FILM COATED ORAL at 20:49

## 2018-04-30 LAB
ANION GAP SERPL CALCULATED.3IONS-SCNC: 9.8 MMOL/L (ref 3.6–11.2)
BASOPHILS # BLD AUTO: 0.09 10*3/MM3 (ref 0–0.3)
BASOPHILS NFR BLD AUTO: 0.5 % (ref 0–2)
BUN BLD-MCNC: 5 MG/DL (ref 7–21)
BUN/CREAT SERPL: 15.2 (ref 7–25)
CALCIUM SPEC-SCNC: 8.4 MG/DL (ref 7.7–10)
CHLORIDE SERPL-SCNC: 97 MMOL/L (ref 99–112)
CO2 SERPL-SCNC: 26.2 MMOL/L (ref 24.3–31.9)
CREAT BLD-MCNC: 0.33 MG/DL (ref 0.43–1.29)
CRP SERPL-MCNC: 4.5 MG/DL (ref 0–0.99)
DEPRECATED RDW RBC AUTO: 53.9 FL (ref 37–54)
EOSINOPHIL # BLD AUTO: 0.45 10*3/MM3 (ref 0–0.7)
EOSINOPHIL NFR BLD AUTO: 2.7 % (ref 0–5)
ERYTHROCYTE [DISTWIDTH] IN BLOOD BY AUTOMATED COUNT: 18.6 % (ref 11.5–14.5)
GFR SERPL CREATININE-BSD FRML MDRD: >150 ML/MIN/1.73
GLUCOSE BLD-MCNC: 107 MG/DL (ref 70–110)
GLUCOSE BLDC GLUCOMTR-MCNC: 112 MG/DL (ref 70–130)
GLUCOSE BLDC GLUCOMTR-MCNC: 116 MG/DL (ref 70–130)
GLUCOSE BLDC GLUCOMTR-MCNC: 157 MG/DL (ref 70–130)
GLUCOSE BLDC GLUCOMTR-MCNC: 263 MG/DL (ref 70–130)
HCT VFR BLD AUTO: 38.9 % (ref 42–52)
HGB BLD-MCNC: 12.6 G/DL (ref 14–18)
IMM GRANULOCYTES # BLD: 0.43 10*3/MM3 (ref 0–0.03)
IMM GRANULOCYTES NFR BLD: 2.6 % (ref 0–0.5)
LYMPHOCYTES # BLD AUTO: 3.87 10*3/MM3 (ref 1–3)
LYMPHOCYTES NFR BLD AUTO: 23.1 % (ref 21–51)
MAGNESIUM SERPL-MCNC: 1.9 MG/DL (ref 1.7–2.6)
MAGNESIUM SERPL-MCNC: 2 MG/DL (ref 1.7–2.6)
MAGNESIUM SERPL-MCNC: 3 MG/DL (ref 1.7–2.6)
MCH RBC QN AUTO: 26.8 PG (ref 27–33)
MCHC RBC AUTO-ENTMCNC: 32.4 G/DL (ref 33–37)
MCV RBC AUTO: 82.8 FL (ref 80–94)
MONOCYTES # BLD AUTO: 1.84 10*3/MM3 (ref 0.1–0.9)
MONOCYTES NFR BLD AUTO: 11 % (ref 0–10)
NEUTROPHILS # BLD AUTO: 10.08 10*3/MM3 (ref 1.4–6.5)
NEUTROPHILS NFR BLD AUTO: 60.1 % (ref 30–70)
OSMOLALITY SERPL CALC.SUM OF ELEC: 264.1 MOSM/KG (ref 273–305)
PHOSPHATE SERPL-MCNC: 3 MG/DL (ref 2.7–4.5)
PHOSPHATE SERPL-MCNC: 4 MG/DL (ref 2.7–4.5)
PHOSPHATE SERPL-MCNC: 4.1 MG/DL (ref 2.7–4.5)
PLATELET # BLD AUTO: 380 10*3/MM3 (ref 130–400)
PMV BLD AUTO: 9.3 FL (ref 6–10)
POTASSIUM BLD-SCNC: 3.5 MMOL/L (ref 3.5–5.3)
RBC # BLD AUTO: 4.7 10*6/MM3 (ref 4.7–6.1)
SODIUM BLD-SCNC: 133 MMOL/L (ref 135–153)
WBC NRBC COR # BLD: 16.76 10*3/MM3 (ref 4.5–12.5)

## 2018-04-30 PROCEDURE — 63710000001 INSULIN DETEMIR PER 5 UNITS: Performed by: HOSPITALIST

## 2018-04-30 PROCEDURE — P9046 ALBUMIN (HUMAN), 25%, 20 ML: HCPCS | Performed by: INTERNAL MEDICINE

## 2018-04-30 PROCEDURE — 99291 CRITICAL CARE FIRST HOUR: CPT | Performed by: INTERNAL MEDICINE

## 2018-04-30 PROCEDURE — 25010000002 HEPARIN FLUSH (PORCINE) 100 UNIT/ML SOLUTION: Performed by: INTERNAL MEDICINE

## 2018-04-30 PROCEDURE — 84100 ASSAY OF PHOSPHORUS: CPT | Performed by: INTERNAL MEDICINE

## 2018-04-30 PROCEDURE — 82962 GLUCOSE BLOOD TEST: CPT

## 2018-04-30 PROCEDURE — 25010000002 HEPARIN (PORCINE) PER 1000 UNITS: Performed by: INTERNAL MEDICINE

## 2018-04-30 PROCEDURE — 25010000002 ALBUMIN HUMAN 25% PER 50 ML: Performed by: INTERNAL MEDICINE

## 2018-04-30 PROCEDURE — 94799 UNLISTED PULMONARY SVC/PX: CPT

## 2018-04-30 PROCEDURE — 25010000002 HYDROCORTISONE SODIUM SUCCINATE 100 MG RECONSTITUTED SOLUTION: Performed by: INTERNAL MEDICINE

## 2018-04-30 PROCEDURE — 63710000001 PREDNISONE PER 1 MG: Performed by: INTERNAL MEDICINE

## 2018-04-30 PROCEDURE — 83735 ASSAY OF MAGNESIUM: CPT | Performed by: INTERNAL MEDICINE

## 2018-04-30 PROCEDURE — 85025 COMPLETE CBC W/AUTO DIFF WBC: CPT | Performed by: INTERNAL MEDICINE

## 2018-04-30 PROCEDURE — 80048 BASIC METABOLIC PNL TOTAL CA: CPT | Performed by: HOSPITALIST

## 2018-04-30 PROCEDURE — 63710000001 INSULIN ASPART PER 5 UNITS: Performed by: INTERNAL MEDICINE

## 2018-04-30 PROCEDURE — 94003 VENT MGMT INPAT SUBQ DAY: CPT

## 2018-04-30 PROCEDURE — 99232 SBSQ HOSP IP/OBS MODERATE 35: CPT | Performed by: HOSPITALIST

## 2018-04-30 PROCEDURE — 25010000002 LINEZOLID 600 MG/300ML SOLUTION: Performed by: INTERNAL MEDICINE

## 2018-04-30 PROCEDURE — 86140 C-REACTIVE PROTEIN: CPT | Performed by: NURSE PRACTITIONER

## 2018-04-30 RX ORDER — POTASSIUM CHLORIDE 1.5 G/1.77G
40 POWDER, FOR SOLUTION ORAL EVERY 4 HOURS
Status: COMPLETED | OUTPATIENT
Start: 2018-04-30 | End: 2018-04-30

## 2018-04-30 RX ORDER — MAGNESIUM SULFATE HEPTAHYDRATE 40 MG/ML
4 INJECTION, SOLUTION INTRAVENOUS ONCE
Status: COMPLETED | OUTPATIENT
Start: 2018-04-30 | End: 2018-04-30

## 2018-04-30 RX ORDER — ALBUMIN (HUMAN) 12.5 G/50ML
25 SOLUTION INTRAVENOUS ONCE
Status: COMPLETED | OUTPATIENT
Start: 2018-04-30 | End: 2018-04-30

## 2018-04-30 RX ORDER — LINEZOLID 2 MG/ML
600 INJECTION, SOLUTION INTRAVENOUS EVERY 12 HOURS
Status: DISCONTINUED | OUTPATIENT
Start: 2018-04-30 | End: 2018-05-02

## 2018-04-30 RX ORDER — PREDNISONE 10 MG/1
10 TABLET ORAL
Status: DISCONTINUED | OUTPATIENT
Start: 2018-05-01 | End: 2018-04-30

## 2018-04-30 RX ADMIN — INSULIN ASPART 2 UNITS: 100 INJECTION, SOLUTION INTRAVENOUS; SUBCUTANEOUS at 17:49

## 2018-04-30 RX ADMIN — MAGNESIUM SULFATE HEPTAHYDRATE 4 G: 40 INJECTION, SOLUTION INTRAVENOUS at 10:23

## 2018-04-30 RX ADMIN — DANTROLENE SODIUM 25 MG: 25 CAPSULE ORAL at 08:15

## 2018-04-30 RX ADMIN — IPRATROPIUM BROMIDE AND ALBUTEROL SULFATE 3 ML: .5; 3 SOLUTION RESPIRATORY (INHALATION) at 18:55

## 2018-04-30 RX ADMIN — HYDROCORTISONE SODIUM SUCCINATE 50 MG: 100 INJECTION, POWDER, FOR SOLUTION INTRAMUSCULAR; INTRAVENOUS at 13:25

## 2018-04-30 RX ADMIN — ACETYLCYSTEINE 3 ML: 200 SOLUTION ORAL; RESPIRATORY (INHALATION) at 07:10

## 2018-04-30 RX ADMIN — IPRATROPIUM BROMIDE AND ALBUTEROL SULFATE 3 ML: .5; 3 SOLUTION RESPIRATORY (INHALATION) at 00:03

## 2018-04-30 RX ADMIN — LEVETIRACETAM 750 MG: 100 SOLUTION ORAL at 20:58

## 2018-04-30 RX ADMIN — DANTROLENE SODIUM 25 MG: 25 CAPSULE ORAL at 21:00

## 2018-04-30 RX ADMIN — CHLORHEXIDINE GLUCONATE 15 ML: 1.2 RINSE ORAL at 20:59

## 2018-04-30 RX ADMIN — OXCARBAZEPINE 900 MG: 300 TABLET, FILM COATED ORAL at 08:15

## 2018-04-30 RX ADMIN — FINASTERIDE 5 MG: 5 TABLET, FILM COATED ORAL at 08:15

## 2018-04-30 RX ADMIN — LINEZOLID 600 MG: 2 INJECTION, SOLUTION INTRAVENOUS at 09:37

## 2018-04-30 RX ADMIN — IPRATROPIUM BROMIDE AND ALBUTEROL SULFATE 3 ML: .5; 3 SOLUTION RESPIRATORY (INHALATION) at 07:10

## 2018-04-30 RX ADMIN — BACLOFEN 20 MG: 10 TABLET ORAL at 20:59

## 2018-04-30 RX ADMIN — Medication 10 ML: at 08:16

## 2018-04-30 RX ADMIN — OXCARBAZEPINE 900 MG: 300 TABLET, FILM COATED ORAL at 20:58

## 2018-04-30 RX ADMIN — SODIUM CHLORIDE, PRESERVATIVE FREE 300 UNITS: 5 INJECTION INTRAVENOUS at 21:00

## 2018-04-30 RX ADMIN — POTASSIUM CHLORIDE 40 MEQ: 1.5 POWDER, FOR SOLUTION ORAL at 08:15

## 2018-04-30 RX ADMIN — ALBUMIN HUMAN 25 G: 0.25 SOLUTION INTRAVENOUS at 13:26

## 2018-04-30 RX ADMIN — IPRATROPIUM BROMIDE AND ALBUTEROL SULFATE 3 ML: .5; 3 SOLUTION RESPIRATORY (INHALATION) at 12:19

## 2018-04-30 RX ADMIN — INSULIN DETEMIR 15 UNITS: 100 INJECTION, SOLUTION SUBCUTANEOUS at 09:37

## 2018-04-30 RX ADMIN — METRONIDAZOLE 500 MG: 500 INJECTION, SOLUTION INTRAVENOUS at 01:25

## 2018-04-30 RX ADMIN — SODIUM HYPOCHLORITE: 1.25 SOLUTION TOPICAL at 08:15

## 2018-04-30 RX ADMIN — CHLORHEXIDINE GLUCONATE 15 ML: 1.2 RINSE ORAL at 08:14

## 2018-04-30 RX ADMIN — METRONIDAZOLE 500 MG: 500 INJECTION, SOLUTION INTRAVENOUS at 17:49

## 2018-04-30 RX ADMIN — LINEZOLID 600 MG: 2 INJECTION, SOLUTION INTRAVENOUS at 20:58

## 2018-04-30 RX ADMIN — LEVOTHYROXINE SODIUM 75 MCG: 75 TABLET ORAL at 06:11

## 2018-04-30 RX ADMIN — HYOSCYAMINE SULFATE 375 MCG: 0.38 TABLET, EXTENDED RELEASE ORAL at 10:22

## 2018-04-30 RX ADMIN — HEPARIN SODIUM 5000 UNITS: 5000 INJECTION, SOLUTION INTRAVENOUS; SUBCUTANEOUS at 08:15

## 2018-04-30 RX ADMIN — POTASSIUM CHLORIDE 40 MEQ: 1.5 POWDER, FOR SOLUTION ORAL at 11:54

## 2018-04-30 RX ADMIN — ACETYLCYSTEINE 3 ML: 200 SOLUTION ORAL; RESPIRATORY (INHALATION) at 18:55

## 2018-04-30 RX ADMIN — CASTOR OIL AND BALSAM, PERU: 788; 87 OINTMENT TOPICAL at 08:16

## 2018-04-30 RX ADMIN — LEVETIRACETAM 750 MG: 100 SOLUTION ORAL at 08:16

## 2018-04-30 RX ADMIN — SODIUM HYPOCHLORITE: 1.25 SOLUTION TOPICAL at 21:00

## 2018-04-30 RX ADMIN — HEPARIN SODIUM 5000 UNITS: 5000 INJECTION, SOLUTION INTRAVENOUS; SUBCUTANEOUS at 20:59

## 2018-04-30 RX ADMIN — METRONIDAZOLE 500 MG: 500 INJECTION, SOLUTION INTRAVENOUS at 10:22

## 2018-04-30 RX ADMIN — Medication 1 CAPSULE: at 08:15

## 2018-04-30 RX ADMIN — PREDNISONE 20 MG: 20 TABLET ORAL at 08:15

## 2018-04-30 RX ADMIN — Medication 10 ML: at 21:00

## 2018-04-30 RX ADMIN — BACLOFEN 20 MG: 10 TABLET ORAL at 17:49

## 2018-04-30 RX ADMIN — PANTOPRAZOLE SODIUM 40 MG: 40 INJECTION, POWDER, FOR SOLUTION INTRAVENOUS at 05:12

## 2018-04-30 RX ADMIN — CASTOR OIL AND BALSAM, PERU: 788; 87 OINTMENT TOPICAL at 21:00

## 2018-04-30 RX ADMIN — ATORVASTATIN CALCIUM 10 MG: 10 TABLET, FILM COATED ORAL at 20:59

## 2018-04-30 RX ADMIN — INSULIN ASPART 6 UNITS: 100 INJECTION, SOLUTION INTRAVENOUS; SUBCUTANEOUS at 11:54

## 2018-04-30 RX ADMIN — HYDROCORTISONE SODIUM SUCCINATE 50 MG: 100 INJECTION, POWDER, FOR SOLUTION INTRAMUSCULAR; INTRAVENOUS at 21:01

## 2018-04-30 RX ADMIN — SODIUM CHLORIDE, PRESERVATIVE FREE 300 UNITS: 5 INJECTION INTRAVENOUS at 08:15

## 2018-05-01 LAB
ANION GAP SERPL CALCULATED.3IONS-SCNC: 10.9 MMOL/L (ref 3.6–11.2)
BACTERIA SPEC AEROBE CULT: NORMAL
BACTERIA SPEC AEROBE CULT: NORMAL
BASOPHILS # BLD AUTO: 0.04 10*3/MM3 (ref 0–0.3)
BASOPHILS NFR BLD AUTO: 0.3 % (ref 0–2)
BUN BLD-MCNC: 6 MG/DL (ref 7–21)
BUN/CREAT SERPL: 18.8 (ref 7–25)
CALCIUM SPEC-SCNC: 8.5 MG/DL (ref 7.7–10)
CHLORIDE SERPL-SCNC: 97 MMOL/L (ref 99–112)
CO2 SERPL-SCNC: 26.1 MMOL/L (ref 24.3–31.9)
CREAT BLD-MCNC: 0.32 MG/DL (ref 0.43–1.29)
CRP SERPL-MCNC: 4.46 MG/DL (ref 0–0.99)
DEPRECATED RDW RBC AUTO: 54 FL (ref 37–54)
EOSINOPHIL # BLD AUTO: 0.03 10*3/MM3 (ref 0–0.7)
EOSINOPHIL NFR BLD AUTO: 0.3 % (ref 0–5)
ERYTHROCYTE [DISTWIDTH] IN BLOOD BY AUTOMATED COUNT: 18.3 % (ref 11.5–14.5)
GFR SERPL CREATININE-BSD FRML MDRD: >150 ML/MIN/1.73
GLUCOSE BLD-MCNC: 198 MG/DL (ref 70–110)
GLUCOSE BLDC GLUCOMTR-MCNC: 157 MG/DL (ref 70–130)
GLUCOSE BLDC GLUCOMTR-MCNC: 214 MG/DL (ref 70–130)
GLUCOSE BLDC GLUCOMTR-MCNC: 217 MG/DL (ref 70–130)
GLUCOSE BLDC GLUCOMTR-MCNC: 221 MG/DL (ref 70–130)
HCT VFR BLD AUTO: 36.9 % (ref 42–52)
HGB BLD-MCNC: 11.9 G/DL (ref 14–18)
IMM GRANULOCYTES # BLD: 0.26 10*3/MM3 (ref 0–0.03)
IMM GRANULOCYTES NFR BLD: 2.3 % (ref 0–0.5)
LYMPHOCYTES # BLD AUTO: 1.09 10*3/MM3 (ref 1–3)
LYMPHOCYTES NFR BLD AUTO: 9.5 % (ref 21–51)
MAGNESIUM SERPL-MCNC: 1.9 MG/DL (ref 1.7–2.6)
MAGNESIUM SERPL-MCNC: 2.2 MG/DL (ref 1.7–2.6)
MAGNESIUM SERPL-MCNC: 2.4 MG/DL (ref 1.7–2.6)
MCH RBC QN AUTO: 26.7 PG (ref 27–33)
MCHC RBC AUTO-ENTMCNC: 32.2 G/DL (ref 33–37)
MCV RBC AUTO: 82.9 FL (ref 80–94)
MONOCYTES # BLD AUTO: 0.67 10*3/MM3 (ref 0.1–0.9)
MONOCYTES NFR BLD AUTO: 5.9 % (ref 0–10)
NEUTROPHILS # BLD AUTO: 9.34 10*3/MM3 (ref 1.4–6.5)
NEUTROPHILS NFR BLD AUTO: 81.7 % (ref 30–70)
OSMOLALITY SERPL CALC.SUM OF ELEC: 271.4 MOSM/KG (ref 273–305)
PHOSPHATE SERPL-MCNC: 1.9 MG/DL (ref 2.7–4.5)
PHOSPHATE SERPL-MCNC: 2.2 MG/DL (ref 2.7–4.5)
PHOSPHATE SERPL-MCNC: 3.1 MG/DL (ref 2.7–4.5)
PLATELET # BLD AUTO: 243 10*3/MM3 (ref 130–400)
PMV BLD AUTO: 9.7 FL (ref 6–10)
POTASSIUM BLD-SCNC: 3.5 MMOL/L (ref 3.5–5.3)
POTASSIUM BLD-SCNC: 3.6 MMOL/L (ref 3.5–5.3)
RBC # BLD AUTO: 4.45 10*6/MM3 (ref 4.7–6.1)
SODIUM BLD-SCNC: 134 MMOL/L (ref 135–153)
WBC NRBC COR # BLD: 11.43 10*3/MM3 (ref 4.5–12.5)

## 2018-05-01 PROCEDURE — 25010000002 LINEZOLID 600 MG/300ML SOLUTION: Performed by: INTERNAL MEDICINE

## 2018-05-01 PROCEDURE — 80048 BASIC METABOLIC PNL TOTAL CA: CPT | Performed by: HOSPITALIST

## 2018-05-01 PROCEDURE — 99291 CRITICAL CARE FIRST HOUR: CPT | Performed by: INTERNAL MEDICINE

## 2018-05-01 PROCEDURE — 84100 ASSAY OF PHOSPHORUS: CPT | Performed by: INTERNAL MEDICINE

## 2018-05-01 PROCEDURE — 94799 UNLISTED PULMONARY SVC/PX: CPT

## 2018-05-01 PROCEDURE — 83735 ASSAY OF MAGNESIUM: CPT | Performed by: INTERNAL MEDICINE

## 2018-05-01 PROCEDURE — 63710000001 INSULIN DETEMIR PER 5 UNITS: Performed by: HOSPITALIST

## 2018-05-01 PROCEDURE — 25010000002 FUROSEMIDE PER 20 MG: Performed by: INTERNAL MEDICINE

## 2018-05-01 PROCEDURE — 82962 GLUCOSE BLOOD TEST: CPT

## 2018-05-01 PROCEDURE — 94003 VENT MGMT INPAT SUBQ DAY: CPT

## 2018-05-01 PROCEDURE — 99233 SBSQ HOSP IP/OBS HIGH 50: CPT | Performed by: INTERNAL MEDICINE

## 2018-05-01 PROCEDURE — 25010000002 HYDROCORTISONE SODIUM SUCCINATE 100 MG RECONSTITUTED SOLUTION: Performed by: INTERNAL MEDICINE

## 2018-05-01 PROCEDURE — 25010000002 HEPARIN (PORCINE) PER 1000 UNITS: Performed by: INTERNAL MEDICINE

## 2018-05-01 PROCEDURE — 86140 C-REACTIVE PROTEIN: CPT | Performed by: NURSE PRACTITIONER

## 2018-05-01 PROCEDURE — 85025 COMPLETE CBC W/AUTO DIFF WBC: CPT | Performed by: INTERNAL MEDICINE

## 2018-05-01 PROCEDURE — P9046 ALBUMIN (HUMAN), 25%, 20 ML: HCPCS | Performed by: INTERNAL MEDICINE

## 2018-05-01 PROCEDURE — 84132 ASSAY OF SERUM POTASSIUM: CPT | Performed by: INTERNAL MEDICINE

## 2018-05-01 PROCEDURE — 63710000001 INSULIN ASPART PER 5 UNITS: Performed by: INTERNAL MEDICINE

## 2018-05-01 PROCEDURE — 25010000002 ALBUMIN HUMAN 25% PER 50 ML: Performed by: INTERNAL MEDICINE

## 2018-05-01 PROCEDURE — 25010000002 HEPARIN FLUSH (PORCINE) 100 UNIT/ML SOLUTION: Performed by: INTERNAL MEDICINE

## 2018-05-01 RX ORDER — ALBUMIN (HUMAN) 12.5 G/50ML
25 SOLUTION INTRAVENOUS ONCE
Status: COMPLETED | OUTPATIENT
Start: 2018-05-01 | End: 2018-05-01

## 2018-05-01 RX ORDER — POTASSIUM CHLORIDE 1.5 G/1.77G
40 POWDER, FOR SOLUTION ORAL EVERY 4 HOURS
Status: COMPLETED | OUTPATIENT
Start: 2018-05-01 | End: 2018-05-01

## 2018-05-01 RX ORDER — FUROSEMIDE 10 MG/ML
20 INJECTION INTRAMUSCULAR; INTRAVENOUS ONCE
Status: COMPLETED | OUTPATIENT
Start: 2018-05-01 | End: 2018-05-01

## 2018-05-01 RX ORDER — POTASSIUM CHLORIDE 1.5 G/1.77G
40 POWDER, FOR SOLUTION ORAL EVERY 4 HOURS
Status: COMPLETED | OUTPATIENT
Start: 2018-05-01 | End: 2018-05-02

## 2018-05-01 RX ADMIN — POTASSIUM CHLORIDE 40 MEQ: 1.5 POWDER, FOR SOLUTION ORAL at 04:21

## 2018-05-01 RX ADMIN — FUROSEMIDE 20 MG: 10 INJECTION, SOLUTION INTRAMUSCULAR; INTRAVENOUS at 12:14

## 2018-05-01 RX ADMIN — LEVETIRACETAM 750 MG: 100 SOLUTION ORAL at 09:26

## 2018-05-01 RX ADMIN — SODIUM HYPOCHLORITE: 1.25 SOLUTION TOPICAL at 20:31

## 2018-05-01 RX ADMIN — HYDROCORTISONE SODIUM SUCCINATE 50 MG: 100 INJECTION, POWDER, FOR SOLUTION INTRAMUSCULAR; INTRAVENOUS at 21:30

## 2018-05-01 RX ADMIN — INSULIN DETEMIR 15 UNITS: 100 INJECTION, SOLUTION SUBCUTANEOUS at 09:27

## 2018-05-01 RX ADMIN — Medication 1 CAPSULE: at 09:17

## 2018-05-01 RX ADMIN — FINASTERIDE 5 MG: 5 TABLET, FILM COATED ORAL at 09:17

## 2018-05-01 RX ADMIN — LEVETIRACETAM 750 MG: 100 SOLUTION ORAL at 20:29

## 2018-05-01 RX ADMIN — IPRATROPIUM BROMIDE AND ALBUTEROL SULFATE 3 ML: .5; 3 SOLUTION RESPIRATORY (INHALATION) at 13:49

## 2018-05-01 RX ADMIN — CHLORHEXIDINE GLUCONATE 15 ML: 1.2 RINSE ORAL at 09:18

## 2018-05-01 RX ADMIN — OXCARBAZEPINE 900 MG: 300 TABLET, FILM COATED ORAL at 09:16

## 2018-05-01 RX ADMIN — METRONIDAZOLE 500 MG: 500 INJECTION, SOLUTION INTRAVENOUS at 02:08

## 2018-05-01 RX ADMIN — INSULIN ASPART 4 UNITS: 100 INJECTION, SOLUTION INTRAVENOUS; SUBCUTANEOUS at 17:41

## 2018-05-01 RX ADMIN — CHLORHEXIDINE GLUCONATE 15 ML: 1.2 RINSE ORAL at 20:30

## 2018-05-01 RX ADMIN — SODIUM HYPOCHLORITE: 1.25 SOLUTION TOPICAL at 09:19

## 2018-05-01 RX ADMIN — HEPARIN SODIUM 5000 UNITS: 5000 INJECTION, SOLUTION INTRAVENOUS; SUBCUTANEOUS at 09:18

## 2018-05-01 RX ADMIN — SODIUM CHLORIDE, PRESERVATIVE FREE 300 UNITS: 5 INJECTION INTRAVENOUS at 09:18

## 2018-05-01 RX ADMIN — DANTROLENE SODIUM 25 MG: 25 CAPSULE ORAL at 20:30

## 2018-05-01 RX ADMIN — PANTOPRAZOLE SODIUM 40 MG: 40 INJECTION, POWDER, FOR SOLUTION INTRAVENOUS at 05:00

## 2018-05-01 RX ADMIN — BACLOFEN 20 MG: 10 TABLET ORAL at 20:29

## 2018-05-01 RX ADMIN — LINEZOLID 600 MG: 2 INJECTION, SOLUTION INTRAVENOUS at 09:26

## 2018-05-01 RX ADMIN — IPRATROPIUM BROMIDE AND ALBUTEROL SULFATE 3 ML: .5; 3 SOLUTION RESPIRATORY (INHALATION) at 19:21

## 2018-05-01 RX ADMIN — ALBUMIN HUMAN 25 G: 0.25 SOLUTION INTRAVENOUS at 12:14

## 2018-05-01 RX ADMIN — ACETYLCYSTEINE 3 ML: 200 SOLUTION ORAL; RESPIRATORY (INHALATION) at 06:00

## 2018-05-01 RX ADMIN — LINEZOLID 600 MG: 2 INJECTION, SOLUTION INTRAVENOUS at 20:29

## 2018-05-01 RX ADMIN — INSULIN ASPART 2 UNITS: 100 INJECTION, SOLUTION INTRAVENOUS; SUBCUTANEOUS at 06:24

## 2018-05-01 RX ADMIN — INSULIN ASPART 4 UNITS: 100 INJECTION, SOLUTION INTRAVENOUS; SUBCUTANEOUS at 20:35

## 2018-05-01 RX ADMIN — ATORVASTATIN CALCIUM 10 MG: 10 TABLET, FILM COATED ORAL at 20:29

## 2018-05-01 RX ADMIN — HYOSCYAMINE SULFATE 125 MCG: 0.12 TABLET ORAL at 16:34

## 2018-05-01 RX ADMIN — HYDROCORTISONE SODIUM SUCCINATE 50 MG: 100 INJECTION, POWDER, FOR SOLUTION INTRAMUSCULAR; INTRAVENOUS at 14:34

## 2018-05-01 RX ADMIN — DANTROLENE SODIUM 25 MG: 25 CAPSULE ORAL at 09:17

## 2018-05-01 RX ADMIN — INSULIN ASPART 4 UNITS: 100 INJECTION, SOLUTION INTRAVENOUS; SUBCUTANEOUS at 11:00

## 2018-05-01 RX ADMIN — BACLOFEN 20 MG: 10 TABLET ORAL at 17:44

## 2018-05-01 RX ADMIN — ACETYLCYSTEINE 3 ML: 200 SOLUTION ORAL; RESPIRATORY (INHALATION) at 19:22

## 2018-05-01 RX ADMIN — SODIUM CHLORIDE, PRESERVATIVE FREE 300 UNITS: 5 INJECTION INTRAVENOUS at 20:30

## 2018-05-01 RX ADMIN — HYOSCYAMINE SULFATE 125 MCG: 0.12 TABLET ORAL at 12:13

## 2018-05-01 RX ADMIN — CASTOR OIL AND BALSAM, PERU: 788; 87 OINTMENT TOPICAL at 09:17

## 2018-05-01 RX ADMIN — POTASSIUM CHLORIDE 40 MEQ: 1.5 POWDER, FOR SOLUTION ORAL at 21:30

## 2018-05-01 RX ADMIN — LEVOTHYROXINE SODIUM 75 MCG: 75 TABLET ORAL at 06:23

## 2018-05-01 RX ADMIN — HYDROCORTISONE SODIUM SUCCINATE 50 MG: 100 INJECTION, POWDER, FOR SOLUTION INTRAMUSCULAR; INTRAVENOUS at 05:00

## 2018-05-01 RX ADMIN — IPRATROPIUM BROMIDE AND ALBUTEROL SULFATE 3 ML: .5; 3 SOLUTION RESPIRATORY (INHALATION) at 06:00

## 2018-05-01 RX ADMIN — HEPARIN SODIUM 5000 UNITS: 5000 INJECTION, SOLUTION INTRAVENOUS; SUBCUTANEOUS at 20:30

## 2018-05-01 RX ADMIN — OXCARBAZEPINE 900 MG: 300 TABLET, FILM COATED ORAL at 20:29

## 2018-05-01 RX ADMIN — Medication 10 ML: at 20:30

## 2018-05-01 RX ADMIN — IPRATROPIUM BROMIDE AND ALBUTEROL SULFATE 3 ML: .5; 3 SOLUTION RESPIRATORY (INHALATION) at 01:10

## 2018-05-01 RX ADMIN — METRONIDAZOLE 500 MG: 500 INJECTION, SOLUTION INTRAVENOUS at 10:54

## 2018-05-01 RX ADMIN — HYOSCYAMINE SULFATE 125 MCG: 0.12 TABLET ORAL at 20:29

## 2018-05-01 RX ADMIN — CASTOR OIL AND BALSAM, PERU: 788; 87 OINTMENT TOPICAL at 20:30

## 2018-05-01 RX ADMIN — POTASSIUM CHLORIDE 40 MEQ: 1.5 POWDER, FOR SOLUTION ORAL at 09:16

## 2018-05-01 RX ADMIN — Medication 10 ML: at 09:19

## 2018-05-01 NOTE — PROGRESS NOTES
[facility] HOSPITALIST PROGRESS NOTE     Patient Identification:  Name:  Dragan Camara  Age:  43 y.o.  Sex:  male  :  1975  MRN:  [id number]  Visit Number:  [id number]  Primary Care Provider:  Neo Tompkins MD    Length of stay:  20    Subjective:  Patient is a 43-year-old  male with history of cerebral palsy with admitted on  with acute respiratory failure with hypoxia and subsequently patient was intubated in the first 24 hours and has been on mechanical ventilation since.  He subsequently hasn't tracheostomy and PEG tube placement and  Long term Vent setting were recommended to which patient's parents were very resistant. They don't want to move the patient to another institution. Dr. Chen is trying to see if Pt can come off the vent and is currently undergoing weaning parameter. Pt without any new complaints.  ----------------------------------------------------------------------------------------------------------------------  Current Hospital Meds:    acetylcysteine 3 mL Inhalation BID - RT   atorvastatin 10 mg Oral Nightly   baclofen 20 mg Oral 2 times per day   castor oil-balsam peru  Topical Q12H   chlorhexidine 15 mL Mouth/Throat Q12H   dantrolene 25 mg Oral BID   finasteride 5 mg Oral Daily   heparin (porcine) 5,000 Units Subcutaneous Q12H   heparin flush (porcine) 3 mL Intracatheter Q12H   hydrocortisone sodium succinate 50 mg Intravenous Q8H   hyoscyamine 375 mcg Oral Q12H   insulin aspart 0-9 Units Subcutaneous 4x Daily AC & at Bedtime   insulin detemir 15 Units Subcutaneous Daily   ipratropium-albuterol 3 mL Nebulization Q6H - RT   lactobacillus acidophilus 1 capsule Oral Daily   levETIRAcetam 750 mg Oral Q12H   levothyroxine 75 mcg Oral QAM   Linezolid 600 mg Intravenous Q12H   metroNIDAZOLE 500 mg Intravenous Q8H   OXcarbazepine 900 mg Oral BID   pantoprazole 40 mg Intravenous Q AM   sodium chloride 10 mL Intracatheter Q12H   sodium hypochlorite  Topical  BID       dexmedetomidine 0.2-1.5 mcg/kg/hr Last Rate: Stopped (04/27/18 0820)   fentaNYL (SUBLIMAZE) PCA 1500 mcg/30 mL syringe     norepinephrine 0.02-0.3 mcg/kg/min Last Rate: Stopped (04/30/18 1700)   propofol 5-50 mcg/kg/min Last Rate: 10 mcg/kg/min (04/27/18 1748)     ----------------------------------------------------------------------------------------------------------------------  Vital Signs:  Temp:  [98.4 °F (36.9 °C)-99.6 °F (37.6 °C)] 98.4 °F (36.9 °C)  Heart Rate:  [] 78  Resp:  [18-24] 18  BP: ()/(45-99) 91/53  FiO2 (%):  [40 %] 40 %  1    04/28/18  0500 04/30/18  0510 05/01/18  0400   Weight: 70.3 kg (154 lb 15 oz) 69.5 kg (153 lb 4.8 oz) 72.1 kg (159 lb)     Body mass index is 22.81 kg/m².    Intake/Output Summary (Last 24 hours) at 05/01/18 0943  Last data filed at 05/01/18 0926   Gross per 24 hour   Intake             1622 ml   Output             1475 ml   Net              147 ml     NPO Diet  ----------------------------------------------------------------------------------------------------------------------  Physical exam:  Constitutional:  Cp.  No respiratory distress. Currently on pressure support 12 and FiO2 of 50%      HENT:  Head:  Normocephalic and atraumatic.  Mouth:  Moist mucous membranes.    Eyes:  Conjunctivae and EOM are normal.  Pupils are equal, round, and reactive to light.  No scleral icterus.    Neck:  Neck supple.  No JVD present.  Trach  Cardiovascular:  Normal rate, regular rhythm and normal heart sounds with no murmur.  Pulmonary/Chest:  No respiratory distress, no wheezes, no crackles, with normal breath sounds and good air movement.  Abdominal:  Soft.  Bowel sounds are normal.  No distension and no tenderness. G-tube in place.  Musculoskeletal:  No edema, no tenderness, and no deformity.  No red or swollen joints anywhere.  contractures  Neurological: awake and alert.   Skin:  Skin is warm and dry. No rash noted. No pallor.   Peripheral vascular:  Strong  pulses in all 4 extremities with no clubbing, no cyanosis, no edema    ----------------------------------------------------------------------------------------------------------------------    ----------------------------------------------------------------------------------------------------------------------        Results from last 7 days  Lab Units 05/01/18 0048 04/30/18 0052 04/29/18 0041   CRP mg/dL 4.46* 4.50* 5.03*   WBC 10*3/mm3 11.43 16.76* 16.69*   HEMOGLOBIN g/dL 11.9* 12.6* 12.1*   HEMATOCRIT % 36.9* 38.9* 37.6*   MCV fL 82.9 82.8 82.5   MCHC g/dL 32.2* 32.4* 32.2*   PLATELETS 10*3/mm3 243 380 425*       Results from last 7 days  Lab Units 04/29/18  0941   PH, ARTERIAL pH units 7.474*   PO2 ART mm Hg 76.0*   PCO2, ARTERIAL mm Hg 35.8   HCO3 ART mmol/L 25.7       Results from last 7 days  Lab Units 05/01/18  0815 05/01/18 0048 04/30/18  1608  04/30/18  0052  04/29/18  0041 04/25/18  0532   SODIUM mmol/L  --  134*  --   --  133*  --  129*  < > 130*   POTASSIUM mmol/L  --  3.6  --   --  3.5  --  4.5  < > 3.3*   MAGNESIUM mg/dL 2.2 2.4 3.0*  < > 2.0  < > 3.0*  < >  --    CHLORIDE mmol/L  --  97*  --   --  97*  --  93*  < > 94*   CO2 mmol/L  --  26.1  --   --  26.2  --  27.2  < > 23.7*   BUN mg/dL  --  6*  --   --  5*  --  6*  < > 7   CREATININE mg/dL  --  0.32*  --   --  0.33*  --  0.33*  < > 0.43   EGFR IF NONAFRICN AM mL/min/1.73  --  >150  --   --  >150  --  >150  < > >150   CALCIUM mg/dL  --  8.5  --   --  8.4  --  8.1  < > 8.6   GLUCOSE mg/dL  --  198*  --   --  107  --  159*  < > 279*   ALBUMIN g/dL  --   --   --   --   --   --   --   --  3.20*   BILIRUBIN mg/dL  --   --   --   --   --   --   --   --  0.3   ALK PHOS U/L  --   --   --   --   --   --   --   --  67   AST (SGOT) U/L  --   --   --   --   --   --   --   --  26   ALT (SGPT) U/L  --   --   --   --   --   --   --   --  24   < > = values in this interval not displayed.Estimated Creatinine Clearance: 297.3 mL/min (by C-G formula based on  SCr of 0.32 mg/dL (L)).  No results found for: AMMONIA      Blood Culture   Date Value Ref Range Status   04/26/2018 No growth at 4 days  Preliminary   04/26/2018 No growth at 4 days  Preliminary              ----------------------------------------------------------------------------------------------------------------------  Imaging Results (last 24 hours)     ** No results found for the last 24 hours. **        ----------------------------------------------------------------------------------------------------------------------  Assessment and Plan:  Active Problems:  Acute respiratory failure with hypoxia  Severe sepsis  Aspiration pneumonia  Acute hypoxic respiratory failure secondary to sepsis and secretions  Possible aspiration pneumonia  Likely UTI  Sepsis  Persistent fever   Hyponatremia  Diabetes mellitus type 2  Cerebral palsy  Decubitus ulcer  Seizure disorder        Re-intubated 4/26  Patient is currently undergoing weaning parameter stated above on pressure support.  contt current abx per Id.  Surgery following. T/P 4/27  SBT 2 hours yesterday.   Dr. Chen's help is appreciated.   continue Levemir  Use sliding scale coverage with low threshold to resume IV insulin  continue home regimen  Wound care and pressure avoidance  The decubitus ulcer does not appear infected at this time  Will check CBC and BMP in am.     DVT prophylaxis  Subcutaneous heparin    Pt has been visited and examined. His management was discussed with his RN : [place].     Kenneth Bansal MD  05/01/18  9:43 AM

## 2018-05-01 NOTE — PROGRESS NOTES
LOS: 20 days     Chief Complaint:  Pulmonology is following for respiratory failure    Subjective     Interval History: Straight leg resting in bed.  No acute distress noted.    History taken from: chart RN    Review of Systems:     Review of Systems - History unobtainable from patient due to mental status                    Objective     Vital Signs  Temp:  [98.2 °F (36.8 °C)-99.6 °F (37.6 °C)] 98.2 °F (36.8 °C)  Heart Rate:  [] 78  Resp:  [18-24] 18  BP: ()/(52-99) 91/53  FiO2 (%):  [40 %] 40 %  Body mass index is 22.81 kg/m².    Intake/Output Summary (Last 24 hours) at 05/01/18 1025  Last data filed at 05/01/18 0926   Gross per 24 hour   Intake             1522 ml   Output             1630 ml   Net             -108 ml     I/O this shift:  In: 300 [IV Piggyback:300]  Out: 155 [Urine:155]    Physical Exam:  GENERAL APPEARANCE: Chronically ill appearing, muscular deformity due to cerebral palsy.  He is awake and alert.  No acute distress noted.    HEAD: normocephalic.    EYES: PERRL    NECK: Neck supple.     CARDIAC: Normal S1 and S2. No S3, S4 or murmurs. Rhythm is regular. There is no peripheral edema, cyanosis or pallor. Extremities are warm and well perfused. Capillary refill is less than 2 seconds. No carotid bruits.    RESPIRATORY: Clear to auscultation and percussion without rales, rhonchi, wheezing or diminished breath sounds.    GI: Positive bowel sounds. Soft, nondistended, nontender.     MUSCULOSKELTAL: Giller deformity due to cerebral palsy.    NEUROLOGICAL: Unable to assess due to mental status.    PSYCHIATRIC: Unable to assess due to mental status.                Results Review:                I reviewed the patient's new clinical results.  I reviewed the patient's new imaging results and agree with the interpretation.    Results from last 7 days  Lab Units 05/01/18  0048 04/30/18  0052 04/29/18  0041   WBC 10*3/mm3 11.43 16.76* 16.69*   HEMOGLOBIN g/dL 11.9* 12.6* 12.1*   PLATELETS  10*3/mm3 243 380 425*       Results from last 7 days  Lab Units 05/01/18  0815 05/01/18  0048 04/30/18  1608  04/30/18  0052  04/29/18  0041   SODIUM mmol/L  --  134*  --   --  133*  --  129*   POTASSIUM mmol/L  --  3.6  --   --  3.5  --  4.5   CHLORIDE mmol/L  --  97*  --   --  97*  --  93*   CO2 mmol/L  --  26.1  --   --  26.2  --  27.2   BUN mg/dL  --  6*  --   --  5*  --  6*   CREATININE mg/dL  --  0.32*  --   --  0.33*  --  0.33*   CALCIUM mg/dL  --  8.5  --   --  8.4  --  8.1   GLUCOSE mg/dL  --  198*  --   --  107  --  159*   MAGNESIUM mg/dL 2.2 2.4 3.0*  < > 2.0  < > 3.0*   < > = values in this interval not displayed.  No results found for: INR, PROTIME    Results from last 7 days  Lab Units 04/25/18  0532   ALK PHOS U/L 67   BILIRUBIN mg/dL 0.3   ALT (SGPT) U/L 24   AST (SGOT) U/L 26       Results from last 7 days  Lab Units 04/29/18  0941   PH, ARTERIAL pH units 7.474*   PO2 ART mm Hg 76.0*   PCO2, ARTERIAL mm Hg 35.8   HCO3 ART mmol/L 25.7     Imaging Results (last 24 hours)     ** No results found for the last 24 hours. **             Medication Review:   Scheduled Medications:    acetylcysteine 3 mL Inhalation BID - RT   atorvastatin 10 mg Oral Nightly   baclofen 20 mg Oral 2 times per day   castor oil-balsam peru  Topical Q12H   chlorhexidine 15 mL Mouth/Throat Q12H   dantrolene 25 mg Oral BID   finasteride 5 mg Oral Daily   heparin (porcine) 5,000 Units Subcutaneous Q12H   heparin flush (porcine) 3 mL Intracatheter Q12H   hydrocortisone sodium succinate 50 mg Intravenous Q8H   hyoscyamine 125 mcg Sublingual Q4H   insulin aspart 0-9 Units Subcutaneous 4x Daily AC & at Bedtime   insulin detemir 15 Units Subcutaneous Daily   ipratropium-albuterol 3 mL Nebulization Q6H - RT   lactobacillus acidophilus 1 capsule Oral Daily   levETIRAcetam 750 mg Oral Q12H   levothyroxine 75 mcg Oral QAM   Linezolid 600 mg Intravenous Q12H   metroNIDAZOLE 500 mg Intravenous Q8H   OXcarbazepine 900 mg Oral BID   pantoprazole  40 mg Intravenous Q AM   sodium chloride 10 mL Intracatheter Q12H   sodium hypochlorite  Topical BID     Continuous infusions:    dexmedetomidine 0.2-1.5 mcg/kg/hr Last Rate: Stopped (04/27/18 0820)   fentaNYL (SUBLIMAZE) PCA 1500 mcg/30 mL syringe     norepinephrine 0.02-0.3 mcg/kg/min Last Rate: Stopped (04/30/18 1700)   propofol 5-50 mcg/kg/min Last Rate: 10 mcg/kg/min (04/27/18 1748)       Assessment/Plan      Neuro: Mental status is at baseline.  Continue to monitor.     Respiratory Failure-Hypoxic: likely related to aspiration pna.     Tracheostomy placed on 4/27/18.  Pressure support trial done today.  Patient became tachypneic and started to desaturate after about 5 minutes.  Placed him back on previous ventilator settings.  We will order him 20 of IV Lasix and albumin today.  Continue scheduled inhalants and as needed neb treatments..   All the ventilator settings and graphics were reviewed.  Hypotension: He is off pressors today.  Continue hydrocortisone. BP within normal range.  NSR.  Continuous ECG, BP and pulse ox monitoring. Maintain MAP > 65.     Renal: BUN/Creatinine WNL.  Strict I&Os.   Continue to monitor.     GI: Continue GI prophylaxis and tube feedings.     DVT prophylaxis: heparin BID.     IV access: [place] left chest wall. Mother reports this is his third line, was placed in September and can keep up to 1 year.      Decubitus: does have a large decubitus ulcer on coccyx, primary is managing.      ID: Leukocytosis, Febrile: Anthony's to have low-grade fever.     Endocrine: monitor glucose targeting 140-180.  CBC is 11.43, neutrophils are 81. Continue antibiotic management per infectious disease.      Patient is waiting for placement in LTAC facility.        Patient Active Problem List   Diagnosis Code   • Acute respiratory failure with hypoxia J96.01   • Severe sepsis A41.9, R65.20   • Aspiration pneumonia J69.0             Kathy Hyman, JOAQUIM  05/01/18  10:25 AM        Attestation:  Scribed for Dr. Chen, by JOAQUIM Atwood, Leonardo Chen M.D. attest that the above note accurately reflects the work and decisions made  by me.  Patient was seen and evaluated by Dr. Chen, including history of present illness, physical exam, assessment, and treatment plan.  The above note was reviewed and edited by Dr. Chen.  Cc time 32 minutes

## 2018-05-01 NOTE — PROGRESS NOTES
"  I have personally seen and examined the patient today and discussed overnight interval progress and pertinent issues with nursing staff.    Subjective:    Patient more awake and alert this morning low-grade fever 99 overnight improving CRP level with normal white count.  40% FiO2 via trach collar.  Family at bedside.    History taken from: chart family RN      Vital Signs    BP 91/53   Pulse 78   Temp 98.2 °F (36.8 °C) (Axillary)   Resp 18   Ht 177.8 cm (70\")   Wt 72.1 kg (159 lb)   SpO2 96%   BMI 22.81 kg/m²     Temp:  [98.2 °F (36.8 °C)-99.6 °F (37.6 °C)] 98.2 °F (36.8 °C)      Intake/Output Summary (Last 24 hours) at 05/01/18 1145  Last data filed at 05/01/18 1054   Gross per 24 hour   Intake             1622 ml   Output             1630 ml   Net               -8 ml     Intake & Output (last 3 days)       04/28 0701 - 04/29 0700 04/29 0701 - 04/30 0700 04/30 0701 - 05/01 0700 05/01 0701 - 05/02 0700    I.V. (mL/kg) 220.2 (3.1)  100 (1.4)     Other 130 180 90     NG/  432     IV Piggyback 6090 537 8829 400    Total Intake(mL/kg) 1934.2 (27.5) 480 (6.9) 1622 (22.5) 400 (5.5)    Urine (mL/kg/hr) 1525 (0.9) 1390 (0.8) 1475 (0.9) 155 (0.5)    Emesis/NG output 220 (0.1)       Stool 0 (0) 0 (0) 0 (0)     Total Output 1745 1390 1475 155    Net +189.2 -910 +147 +245            Unmeasured Stool Occurrence 1 x 3 x 1 x           Physical exam:    General Appearance:    Ventilated via trach    Head:    Normocephalic, without obvious abnormality, atraumatic   Eyes:            Lids and lashes normal, conjunctivae and sclerae normal, no   icterus, no pallor, corneas clear, PERRLA   Ears:    Ears appear intact with no abnormalities noted   Throat:   No oral lesions, no thrush, oral mucosa moist   Neck:   No adenopathy, supple, trachea midline, no thyromegaly, no   carotid bruit, no JVD   Back:     No tenderness to percussion or palpation, range of motion   normal   Lungs:     Clear to auscultation,respirations " "regular, even and unlabored. No wheezing, no ronchi and no crackles.    Heart:    Regular rhythm and normal rate, normal S1 and S2, no            murmur, no gallop, no rub, no click   Chest Wall:    No abnormalities observed   Abdomen:     Normal bowel sounds, no masses, no organomegaly, soft        non-tender, non-distended, no guarding, no rebound                tenderness   Rectal:     Deferred   Extremities:   Cerebral palsy, contractures   Pulses:   Pulses palpable and equal bilaterally   Skin:   No bleeding, bruising or rash   Lymph nodes:   No palpable adenopathy   Neurologic:   Ventilated sedated, quadraplegia           Results:      Results from last 7 days  Lab Units 05/01/18  0048 04/30/18  0052 04/29/18  0041 04/28/18  0049 04/27/18  0040 04/26/18  0043 04/25/18  0030   WBC 10*3/mm3 11.43 16.76* 16.69* 16.21* 11.78 23.42* 14.77*     Lab Results   Component Value Date    NEUTROABS 9.34 (H) 05/01/2018         Results from last 7 days  Lab Units 05/01/18  0048   CREATININE mg/dL 0.32*         Results from last 7 days  Lab Units 05/01/18  0048 04/30/18  0052 04/29/18  0041   CRP mg/dL 4.46* 4.50* 5.03*     Results Review:    I have personally reviewed laboratory data, culture results, radiology studies and antimicrobial therapy.    Hospital Medications (active)       Dose Frequency Start End    acetaminophen (TYLENOL) suppository 650 mg 650 mg Every 6 Hours 4/15/2018     Sig - Route: Insert 1 suppository into the rectum Every 6 (Six) Hours. - Rectal    Linked Group 1:  \"Or\" Linked Group Details        acetaminophen (TYLENOL) tablet 650 mg 650 mg Every 6 Hours 4/15/2018     Sig - Route: Take 2 tablets by mouth Every 6 (Six) Hours. - Oral    Linked Group 1:  \"Or\" Linked Group Details        atorvastatin (LIPITOR) tablet 10 mg 10 mg Nightly 4/11/2018     Sig - Route: Take 1 tablet by mouth Every Night. - Oral    baclofen (LIORESAL) tablet 20 mg 20 mg User Specified 4/11/2018     Sig - Route: Take 2 tablets by " mouth 2 times per day. - Oral    castor oil-balsam peru (VENELEX) ointment  Every 12 Hours Scheduled 4/12/2018     Sig - Route: Apply  topically Every 12 (Twelve) Hours. - Topical    cefepime (MAXIPIME) 2 g/100 mL 0.9% NS (mbp) 2 g Once 4/15/2018 4/15/2018    Sig - Route: Infuse 100 mL into a venous catheter 1 (One) Time. - Intravenous    cefepime (MAXIPIME) 2 g/100 mL 0.9% NS (mbp) 2 g Every 8 Hours 4/16/2018 4/21/2018    Sig - Route: Infuse 100 mL into a venous catheter Every 8 (Eight) Hours. - Intravenous    chlorhexidine (PERIDEX) 0.12 % solution 15 mL 15 mL Every 12 Hours Scheduled 4/12/2018     Sig - Route: Apply 15 mL to the mouth or throat Every 12 (Twelve) Hours. - Mouth/Throat    citalopram (CeleXA) tablet 20 mg 20 mg Nightly 4/11/2018     Sig - Route: Take 1 tablet by mouth Every Night. - Oral    dantrolene (DANTRIUM) capsule 25 mg 25 mg 2 Times Daily 4/11/2018     Sig - Route: Take 1 capsule by mouth 2 (Two) Times a Day. - Oral    dexmedetomidine HCl (PRECEDEX) 400 mcg in sodium chloride 0.9 % 100 mL (4 mcg/mL) infusion 0.2-1.5 mcg/kg/hr × 71.7 kg Titrated 4/13/2018     Sig - Route: Infuse 14..55 mcg/hr into a venous catheter Dose Adjusted By Provider As Needed. - Intravenous    dextrose (D50W) solution 25 g 25 g Every 15 Minutes PRN 4/11/2018     Sig - Route: Infuse 50 mL into a venous catheter Every 15 (Fifteen) Minutes As Needed for Low Blood Sugar (Blood Sugar Less Than 70). - Intravenous    dextrose (GLUTOSE) oral gel 15 g 15 g Every 15 Minutes PRN 4/11/2018     Sig - Route: Take 15 g by mouth Every 15 (Fifteen) Minutes As Needed for Low Blood Sugar (Blood sugar less than 70). - Oral    furosemide (LASIX) injection 40 mg 40 mg Once 4/16/2018 4/16/2018    Sig - Route: Infuse 4 mL into a venous catheter 1 (One) Time. - Intravenous    glucagon (human recombinant) (GLUCAGEN DIAGNOSTIC) injection 1 mg 1 mg As Needed 4/11/2018     Sig - Route: Inject 1 mg under the skin As Needed (Blood Glucose Less  Than 70). - Subcutaneous    heparin (porcine) 5000 UNIT/ML injection 5,000 Units 5,000 Units Every 12 Hours Scheduled 4/11/2018     Sig - Route: Inject 1 mL under the skin Every 12 (Twelve) Hours. - Subcutaneous    heparin flush (porcine) 100 UNIT/ML injection 300 Units 3 mL Every 12 Hours Scheduled 4/12/2018     Sig - Route: 3 mL by Intracatheter route Every 12 (Twelve) Hours. - Intracatheter    heparin flush (porcine) 100 UNIT/ML injection 300 Units 3 mL As Needed 4/11/2018     Sig - Route: 3 mL by Intracatheter route As Needed for Line Care (After Medication Administration or Blood Draw If Not Going to Be Utilized Immediately). - Intracatheter    ibuprofen (ADVIL,MOTRIN) tablet 600 mg 600 mg Every 6 Hours PRN 4/14/2018     Sig - Route: Take 1 tablet by mouth Every 6 (Six) Hours As Needed for Mild Pain . - Oral    insulin aspart (novoLOG) injection 0-7 Units 0-7 Units 4 Times Daily Before Meals & Nightly 4/11/2018     Sig - Route: Inject 0-7 Units under the skin 4 (Four) Times a Day Before Meals & at Bedtime. - Subcutaneous    ipratropium-albuterol (DUO-NEB) nebulizer solution 3 mL 3 mL Every 6 Hours - RT 4/11/2018     Sig - Route: Take 3 mL by nebulization Every 6 (Six) Hours. - Nebulization    lactobacillus acidophilus (RISAQUAD) capsule 1 capsule 1 capsule Daily 4/12/2018     Sig - Route: Take 1 capsule by mouth Daily. - Oral    levETIRAcetam (KEPPRA) 100 MG/ML solution 750 mg 750 mg Every 12 Hours Scheduled 4/12/2018     Sig - Route: Take 7.5 mL by mouth Every 12 (Twelve) Hours. - Oral    levothyroxine (SYNTHROID, LEVOTHROID) tablet 75 mcg 75 mcg Every Morning 4/12/2018     Sig - Route: Take 1 tablet by mouth Every Morning. - Oral    metroNIDAZOLE (FLAGYL) IVPB 500 mg 500 mg Every 8 Hours 4/15/2018 4/20/2018    Sig - Route: Infuse 100 mL into a venous catheter Every 8 (Eight) Hours. - Intravenous    ondansetron (ZOFRAN) injection 4 mg 4 mg Every 6 Hours PRN 4/11/2018     Sig - Route: Infuse 2 mL into a venous  "catheter Every 6 (Six) Hours As Needed for Nausea or Vomiting. - Intravenous    OXcarbazepine (TRILEPTAL) tablet 900 mg 900 mg 2 Times Daily 4/11/2018     Sig - Route: Take 3 tablets by mouth 2 (Two) Times a Day. - Oral    pantoprazole (PROTONIX) injection 40 mg 40 mg Every Early Morning 4/11/2018     Sig - Route: Infuse 10 mL into a venous catheter Every Morning. - Intravenous    potassium chloride (KLOR-CON) packet 40 mEq 40 mEq As Needed 4/13/2018     Sig - Route: Take 40 mEq by mouth As Needed (potassium replacement, see admin instructions). - Oral    Linked Group 2:  \"Or\" Linked Group Details        potassium chloride (KLOR-CON) packet 40 mEq 40 mEq Once 4/16/2018 4/16/2018    Sig - Route: Take 40 mEq by mouth 1 (One) Time. - Oral    potassium chloride (MICRO-K) CR capsule 40 mEq 40 mEq As Needed 4/13/2018     Sig - Route: Take 4 capsules by mouth As Needed (potassium replacement.  see admin instructions). - Oral    Linked Group 2:  \"Or\" Linked Group Details        potassium chloride 10 mEq in 100 mL IVPB 10 mEq Every 1 Hour PRN 4/13/2018     Sig - Route: Infuse 100 mL into a venous catheter Every 1 (One) Hour As Needed (potassium protocol PERIPHERAL - see admin instructions). - Intravenous    Linked Group 2:  \"Or\" Linked Group Details        sodium chloride 0.9 % flush 1-10 mL 1-10 mL As Needed 4/11/2018     Sig - Route: Infuse 1-10 mL into a venous catheter As Needed for Line Care. - Intravenous    sodium chloride 0.9 % flush 10 mL 10 mL As Needed 4/11/2018     Sig - Route: Infuse 10 mL into a venous catheter As Needed for Line Care. - Intravenous    Linked Group 3:  \"And\" Linked Group Details        sodium chloride 0.9 % flush 10 mL 10 mL As Needed 4/11/2018     Sig - Route: Infuse 10 mL into a venous catheter As Needed for Line Care. - Intravenous    Linked Group 4:  \"And\" Linked Group Details        sodium chloride 0.9 % flush 10 mL 10 mL Every 12 Hours Scheduled 4/12/2018     Sig - Route: 10 mL by " Intracatheter route Every 12 (Twelve) Hours. - Intracatheter    sodium chloride 0.9 % flush 10 mL 10 mL As Needed 4/11/2018     Sig - Route: 10 mL by Intracatheter route As Needed for Line Care (After Medication Administration or Blood Draw). - Intracatheter    sodium hypochlorite (DAKIN'S 1/4 STRENGTH) 0.125 % topical solution 0.125% solution  2 Times Daily 4/12/2018     Sig - Route: Apply  topically 2 (Two) Times a Day. - Topical    vancomycin (VANCOCIN) 1,000 mg in sodium chloride 0.9 % 250 mL IVPB 1,000 mg Every 12 Hours 4/12/2018 4/19/2018    Sig - Route: Infuse 1,000 mg into a venous catheter Every 12 (Twelve) Hours. - Intravenous    aztreonam (AZACTAM) 2 g/100 mL 0.9% NS (mbp) (Discontinued) 2 g Every 8 Hours 4/12/2018 4/15/2018    Sig - Route: Infuse 100 mL into a venous catheter Every 8 (Eight) Hours. - Intravenous    Pharmacy Consult - Pharmacy to dose (Discontinued)  Continuous PRN 4/15/2018 4/15/2018    Sig - Route: Continuous As Needed for Consult. - Does not apply    Reason for Discontinue: Dose adjustment    propofol (DIPRIVAN) infusion 10 mg/mL 100 mL (Discontinued) 5-50 mcg/kg/min × 68 kg Titrated 4/11/2018 4/16/2018    Sig - Route: Infuse 340-3,400 mcg/min into a venous catheter Dose Adjusted By Provider As Needed. - Intravenous    sodium chloride 0.9 % infusion (Discontinued) 125 mL/hr Continuous 4/11/2018 4/16/2018    Sig - Route: Infuse 125 mL/hr into a venous catheter Continuous. - Intravenous            Cultures:    Blood Culture   Date Value Ref Range Status   04/15/2018 No growth at less than 24 hours  Preliminary   04/15/2018 No growth at 24 hours  Preliminary   04/11/2018 No growth at 4 days  Preliminary   04/11/2018 No growth at 4 days  Preliminary     Urine Culture   Date Value Ref Range Status   04/11/2018 >100,000 CFU/mL Normal Urogenital Lachelle  Final     Respiratory Culture   Date Value Ref Range Status   04/12/2018 (A)  Preliminary    Scant growth (1+) Gram positive cocci, consistent  with Staph spp   04/12/2018 (A)  Preliminary    Scant growth (1+) Gram positive cocci, consistent with Staph spp   04/12/2018 (A)  Preliminary    Light growth (2+) Gram positive cocci, consistent with Staph spp           Assessment/Plan     ASSESSMENT:       1.  Severe sepsis with acute respiratory failure requiring mechanical ventilation  2.  Aspiration pneumonia    PLAN:    Patient more awake and alert this morning low-grade fever 99 overnight improving CRP level with normal white count.  40% FiO2 via trach collar.  Family at bedside.    Patient continues on mechanical ventilation 40% FiO2 via trach with sats 95%.  Vital signs stable.   Case discussed with family and nurse.  Would recommend to continue to follow off of cefepime with concerns of  drug fever.    Would recommend to continue Zyvox 600 mg IV every 12 hours and IV Flagyl through 5/15/18 as patient continues to be at high risk for MRSA pneumonia and aspiration pneumonia .    Celexa 20 mg daily was DC'd at this time.  Would recommend to restart Celexa when Zyvox therapy has completed.     We'll continue to follow finalization of cultures and adjust antibiotic therapy if indicated.    CRP in a.m.    Patient's findings and recommendations were discussed with family and nursing staff    Code Status: Full Code     Sharee Anne, JOAQUIM  05/01/18  11:45 AM

## 2018-05-01 NOTE — PROGRESS NOTES
Discharge Planning Assessment   Killian     Patient Name: Dragan Camara  MRN: [id number]  Today's Date: 5/1/2018    Admit Date: 4/11/2018      Discharge Plan     Row Name 05/01/18 1013       Plan    Plan SS spoke with Dr. Chen and he is agreeable to complete the peer to peer. SS spoke with the pt's mother and father and they are agreeable with the transfer if the pt is approved.  SS contacted Marisa at [facility] and informed.  SS will follow.         Expected Discharge Date and Time     Expected Discharge Date Expected Discharge Time    May 2, 2018               Jamila Riddle

## 2018-05-01 NOTE — PLAN OF CARE
Problem: Patient Care Overview  Goal: Plan of Care Review  Outcome: Ongoing (interventions implemented as appropriate)      Problem: Ventilation, Mechanical Invasive (Adult)  Goal: Signs and Symptoms of Listed Potential Problems Will be Absent, Minimized or Managed (Ventilation, Mechanical Invasive)  Outcome: Ongoing (interventions implemented as appropriate)    Goal: Signs and Symptoms of Listed Potential Problems Will be Absent, Minimized or Managed (Ventilation, Mechanical Invasive)  Outcome: Ongoing (interventions implemented as appropriate)      Problem: Diabetes, Type 2 (Adult)  Goal: Signs and Symptoms of Listed Potential Problems Will be Absent, Minimized or Managed (Diabetes, Type 2)  Outcome: Ongoing (interventions implemented as appropriate)

## 2018-05-01 NOTE — PLAN OF CARE
Problem: Fall Risk (Adult)  Goal: Absence of Fall  Outcome: Ongoing (interventions implemented as appropriate)      Problem: Patient Care Overview  Goal: Plan of Care Review  Outcome: Ongoing (interventions implemented as appropriate)    Goal: Discharge Needs Assessment  Outcome: Ongoing (interventions implemented as appropriate)      Problem: Ventilation, Mechanical Invasive (Adult)  Goal: Signs and Symptoms of Listed Potential Problems Will be Absent, Minimized or Managed (Ventilation, Mechanical Invasive)  Outcome: Ongoing (interventions implemented as appropriate)      Problem: Skin Injury Risk (Adult)  Goal: Skin Health and Integrity  Outcome: Ongoing (interventions implemented as appropriate)

## 2018-05-02 ENCOUNTER — APPOINTMENT (OUTPATIENT)
Dept: GENERAL RADIOLOGY | Facility: HOSPITAL | Age: 43
End: 2018-05-02

## 2018-05-02 ENCOUNTER — HOSPITAL ENCOUNTER (OUTPATIENT)
Facility: HOSPITAL | Age: 43
Discharge: NURSING, REHAB OR LONG-TERM CARE | End: 2018-05-08
Attending: INTERNAL MEDICINE | Admitting: INTERNAL MEDICINE

## 2018-05-02 VITALS
BODY MASS INDEX: 23.09 KG/M2 | TEMPERATURE: 98.3 F | DIASTOLIC BLOOD PRESSURE: 111 MMHG | HEIGHT: 70 IN | OXYGEN SATURATION: 98 % | RESPIRATION RATE: 18 BRPM | SYSTOLIC BLOOD PRESSURE: 137 MMHG | HEART RATE: 98 BPM | WEIGHT: 161.3 LBS

## 2018-05-02 LAB
ANION GAP SERPL CALCULATED.3IONS-SCNC: 6.8 MMOL/L (ref 3.6–11.2)
BASOPHILS # BLD AUTO: 0.05 10*3/MM3 (ref 0–0.3)
BASOPHILS NFR BLD AUTO: 0.4 % (ref 0–2)
BILIRUB UR QL STRIP: NEGATIVE
BUN BLD-MCNC: 12 MG/DL (ref 7–21)
BUN/CREAT SERPL: 31.6 (ref 7–25)
CALCIUM SPEC-SCNC: 8.8 MG/DL (ref 7.7–10)
CHLORIDE SERPL-SCNC: 99 MMOL/L (ref 99–112)
CLARITY UR: CLEAR
CO2 SERPL-SCNC: 27.2 MMOL/L (ref 24.3–31.9)
COLOR UR: ABNORMAL
CREAT BLD-MCNC: 0.38 MG/DL (ref 0.43–1.29)
CRP SERPL-MCNC: 1.54 MG/DL (ref 0–0.99)
DEPRECATED RDW RBC AUTO: 54.7 FL (ref 37–54)
EOSINOPHIL # BLD AUTO: 0.06 10*3/MM3 (ref 0–0.7)
EOSINOPHIL NFR BLD AUTO: 0.5 % (ref 0–5)
ERYTHROCYTE [DISTWIDTH] IN BLOOD BY AUTOMATED COUNT: 18.5 % (ref 11.5–14.5)
GFR SERPL CREATININE-BSD FRML MDRD: >150 ML/MIN/1.73
GLUCOSE BLD-MCNC: 247 MG/DL (ref 70–110)
GLUCOSE BLDC GLUCOMTR-MCNC: 134 MG/DL (ref 70–130)
GLUCOSE BLDC GLUCOMTR-MCNC: 184 MG/DL (ref 70–130)
GLUCOSE BLDC GLUCOMTR-MCNC: 235 MG/DL (ref 70–130)
GLUCOSE BLDC GLUCOMTR-MCNC: 296 MG/DL (ref 70–130)
GLUCOSE UR STRIP-MCNC: ABNORMAL MG/DL
HCT VFR BLD AUTO: 35.8 % (ref 42–52)
HGB BLD-MCNC: 11.2 G/DL (ref 14–18)
HGB UR QL STRIP.AUTO: ABNORMAL
IMM GRANULOCYTES # BLD: 0.43 10*3/MM3 (ref 0–0.03)
IMM GRANULOCYTES NFR BLD: 3.5 % (ref 0–0.5)
KETONES UR QL STRIP: NEGATIVE
LEUKOCYTE ESTERASE UR QL STRIP.AUTO: ABNORMAL
LYMPHOCYTES # BLD AUTO: 1.96 10*3/MM3 (ref 1–3)
LYMPHOCYTES NFR BLD AUTO: 16.1 % (ref 21–51)
MAGNESIUM SERPL-MCNC: 1.8 MG/DL (ref 1.7–2.6)
MAGNESIUM SERPL-MCNC: 2.6 MG/DL (ref 1.7–2.6)
MCH RBC QN AUTO: 26.2 PG (ref 27–33)
MCHC RBC AUTO-ENTMCNC: 31.3 G/DL (ref 33–37)
MCV RBC AUTO: 83.8 FL (ref 80–94)
MONOCYTES # BLD AUTO: 0.97 10*3/MM3 (ref 0.1–0.9)
MONOCYTES NFR BLD AUTO: 8 % (ref 0–10)
NEUTROPHILS # BLD AUTO: 8.67 10*3/MM3 (ref 1.4–6.5)
NEUTROPHILS NFR BLD AUTO: 71.5 % (ref 30–70)
NITRITE UR QL STRIP: POSITIVE
OSMOLALITY SERPL CALC.SUM OF ELEC: 274.4 MOSM/KG (ref 273–305)
PH UR STRIP.AUTO: 7 [PH] (ref 5–8)
PHOSPHATE SERPL-MCNC: 1.6 MG/DL (ref 2.7–4.5)
PHOSPHATE SERPL-MCNC: 2.6 MG/DL (ref 2.7–4.5)
PLATELET # BLD AUTO: 254 10*3/MM3 (ref 130–400)
PMV BLD AUTO: 9.9 FL (ref 6–10)
POTASSIUM BLD-SCNC: 4.3 MMOL/L (ref 3.5–5.3)
PROT UR QL STRIP: NEGATIVE
RBC # BLD AUTO: 4.27 10*6/MM3 (ref 4.7–6.1)
SODIUM BLD-SCNC: 133 MMOL/L (ref 135–153)
SP GR UR STRIP: >1.03 (ref 1–1.03)
UROBILINOGEN UR QL STRIP: ABNORMAL
WBC NRBC COR # BLD: 12.14 10*3/MM3 (ref 4.5–12.5)

## 2018-05-02 PROCEDURE — 94799 UNLISTED PULMONARY SVC/PX: CPT

## 2018-05-02 PROCEDURE — 82962 GLUCOSE BLOOD TEST: CPT

## 2018-05-02 PROCEDURE — 84100 ASSAY OF PHOSPHORUS: CPT | Performed by: INTERNAL MEDICINE

## 2018-05-02 PROCEDURE — 87186 SC STD MICRODIL/AGAR DIL: CPT | Performed by: INTERNAL MEDICINE

## 2018-05-02 PROCEDURE — 25010000002 HYDROCORTISONE SODIUM SUCCINATE 100 MG RECONSTITUTED SOLUTION: Performed by: INTERNAL MEDICINE

## 2018-05-02 PROCEDURE — 99291 CRITICAL CARE FIRST HOUR: CPT | Performed by: INTERNAL MEDICINE

## 2018-05-02 PROCEDURE — 63710000001 INSULIN DETEMIR PER 5 UNITS: Performed by: HOSPITALIST

## 2018-05-02 PROCEDURE — 87086 URINE CULTURE/COLONY COUNT: CPT | Performed by: INTERNAL MEDICINE

## 2018-05-02 PROCEDURE — 94003 VENT MGMT INPAT SUBQ DAY: CPT

## 2018-05-02 PROCEDURE — 86140 C-REACTIVE PROTEIN: CPT | Performed by: NURSE PRACTITIONER

## 2018-05-02 PROCEDURE — 87077 CULTURE AEROBIC IDENTIFY: CPT

## 2018-05-02 PROCEDURE — 71045 X-RAY EXAM CHEST 1 VIEW: CPT

## 2018-05-02 PROCEDURE — 87070 CULTURE OTHR SPECIMN AEROBIC: CPT | Performed by: INTERNAL MEDICINE

## 2018-05-02 PROCEDURE — 87077 CULTURE AEROBIC IDENTIFY: CPT | Performed by: INTERNAL MEDICINE

## 2018-05-02 PROCEDURE — 87205 SMEAR GRAM STAIN: CPT | Performed by: INTERNAL MEDICINE

## 2018-05-02 PROCEDURE — 83735 ASSAY OF MAGNESIUM: CPT | Performed by: INTERNAL MEDICINE

## 2018-05-02 PROCEDURE — 93010 ELECTROCARDIOGRAM REPORT: CPT | Performed by: INTERNAL MEDICINE

## 2018-05-02 PROCEDURE — 25010000002 LINEZOLID 600 MG/300ML SOLUTION: Performed by: INTERNAL MEDICINE

## 2018-05-02 PROCEDURE — 25010000002 HEPARIN (PORCINE) PER 1000 UNITS: Performed by: INTERNAL MEDICINE

## 2018-05-02 PROCEDURE — 93005 ELECTROCARDIOGRAM TRACING: CPT | Performed by: INTERNAL MEDICINE

## 2018-05-02 PROCEDURE — 25010000002 HEPARIN FLUSH (PORCINE) 100 UNIT/ML SOLUTION: Performed by: INTERNAL MEDICINE

## 2018-05-02 PROCEDURE — 87186 SC STD MICRODIL/AGAR DIL: CPT

## 2018-05-02 PROCEDURE — 71045 X-RAY EXAM CHEST 1 VIEW: CPT | Performed by: RADIOLOGY

## 2018-05-02 PROCEDURE — 63710000001 INSULIN ASPART PER 5 UNITS: Performed by: INTERNAL MEDICINE

## 2018-05-02 PROCEDURE — 81003 URINALYSIS AUTO W/O SCOPE: CPT | Performed by: INTERNAL MEDICINE

## 2018-05-02 PROCEDURE — 85025 COMPLETE CBC W/AUTO DIFF WBC: CPT | Performed by: INTERNAL MEDICINE

## 2018-05-02 PROCEDURE — 80048 BASIC METABOLIC PNL TOTAL CA: CPT | Performed by: HOSPITALIST

## 2018-05-02 PROCEDURE — 99239 HOSP IP/OBS DSCHRG MGMT >30: CPT | Performed by: INTERNAL MEDICINE

## 2018-05-02 RX ORDER — ATORVASTATIN CALCIUM 10 MG/1
10 TABLET, FILM COATED ORAL NIGHTLY
Start: 2018-05-02

## 2018-05-02 RX ORDER — MAGNESIUM SULFATE HEPTAHYDRATE 40 MG/ML
4 INJECTION, SOLUTION INTRAVENOUS ONCE
Status: COMPLETED | OUTPATIENT
Start: 2018-05-02 | End: 2018-05-02

## 2018-05-02 RX ORDER — ONDANSETRON 2 MG/ML
4 INJECTION INTRAMUSCULAR; INTRAVENOUS EVERY 6 HOURS PRN
Start: 2018-05-02

## 2018-05-02 RX ORDER — MAGNESIUM SULFATE HEPTAHYDRATE 40 MG/ML
2 INJECTION, SOLUTION INTRAVENOUS AS NEEDED
Start: 2018-05-02

## 2018-05-02 RX ORDER — BACLOFEN 20 MG/1
20 TABLET ORAL 2 TIMES DAILY
Start: 2018-05-02

## 2018-05-02 RX ORDER — MAGNESIUM SULFATE HEPTAHYDRATE 40 MG/ML
4 INJECTION, SOLUTION INTRAVENOUS ONCE
Qty: 100 ML | Refills: 0 | Status: SHIPPED | OUTPATIENT
Start: 2018-05-02 | End: 2018-05-02

## 2018-05-02 RX ORDER — OXCARBAZEPINE 300 MG/1
900 TABLET, FILM COATED ORAL 2 TIMES DAILY
Start: 2018-05-02

## 2018-05-02 RX ORDER — DANTROLENE SODIUM 25 MG/1
25 CAPSULE ORAL 2 TIMES DAILY
Start: 2018-05-02

## 2018-05-02 RX ORDER — FINASTERIDE 5 MG/1
5 TABLET, FILM COATED ORAL DAILY
Start: 2018-05-03

## 2018-05-02 RX ORDER — CASTOR OIL AND BALSAM, PERU 788; 87 MG/G; MG/G
OINTMENT TOPICAL
Start: 2018-05-02

## 2018-05-02 RX ORDER — SODIUM CHLORIDE 0.9 % (FLUSH) 0.9 %
10 SYRINGE (ML) INJECTION AS NEEDED
Start: 2018-05-02

## 2018-05-02 RX ORDER — BISACODYL 10 MG
10 SUPPOSITORY, RECTAL RECTAL DAILY PRN
Start: 2018-05-02

## 2018-05-02 RX ORDER — DEXTROSE MONOHYDRATE 25 G/50ML
25 INJECTION, SOLUTION INTRAVENOUS
Start: 2018-05-02

## 2018-05-02 RX ORDER — NICOTINE POLACRILEX 4 MG
15 LOZENGE BUCCAL
Start: 2018-05-02

## 2018-05-02 RX ORDER — IPRATROPIUM BROMIDE AND ALBUTEROL SULFATE 2.5; .5 MG/3ML; MG/3ML
3 SOLUTION RESPIRATORY (INHALATION)
Qty: 360 ML
Start: 2018-05-02

## 2018-05-02 RX ORDER — ACETAMINOPHEN 160 MG/5ML
650 SOLUTION ORAL EVERY 6 HOURS PRN
Start: 2018-05-02

## 2018-05-02 RX ORDER — HEPARIN SODIUM 5000 [USP'U]/ML
5000 INJECTION, SOLUTION INTRAVENOUS; SUBCUTANEOUS EVERY 12 HOURS SCHEDULED
Start: 2018-05-02

## 2018-05-02 RX ORDER — POTASSIUM CHLORIDE 7.45 MG/ML
10 INJECTION INTRAVENOUS
Start: 2018-05-02

## 2018-05-02 RX ORDER — SODIUM HYPOCHLORITE 1.25 MG/ML
SOLUTION TOPICAL
Start: 2018-05-02

## 2018-05-02 RX ORDER — CHLORHEXIDINE GLUCONATE 0.12 MG/ML
15 RINSE ORAL EVERY 12 HOURS SCHEDULED
Start: 2018-05-02

## 2018-05-02 RX ORDER — LEVOTHYROXINE SODIUM 0.07 MG/1
75 TABLET ORAL EVERY MORNING
Start: 2018-05-03

## 2018-05-02 RX ORDER — LINEZOLID 2 MG/ML
600 INJECTION, SOLUTION INTRAVENOUS EVERY 12 HOURS
Qty: 2100 ML | Refills: 0
Start: 2018-05-02 | End: 2018-05-06

## 2018-05-02 RX ORDER — POTASSIUM CHLORIDE 750 MG/1
40 CAPSULE, EXTENDED RELEASE ORAL AS NEEDED
Start: 2018-05-02

## 2018-05-02 RX ORDER — PANTOPRAZOLE SODIUM 40 MG/10ML
40 INJECTION, POWDER, LYOPHILIZED, FOR SOLUTION INTRAVENOUS
Start: 2018-05-02

## 2018-05-02 RX ORDER — LEVETIRACETAM 100 MG/ML
750 SOLUTION ORAL EVERY 12 HOURS SCHEDULED
Start: 2018-05-02

## 2018-05-02 RX ORDER — ACETYLCYSTEINE 200 MG/ML
3 SOLUTION ORAL; RESPIRATORY (INHALATION)
Start: 2018-05-02

## 2018-05-02 RX ORDER — L.ACID,PARA/B.BIFIDUM/S.THERM 8B CELL
1 CAPSULE ORAL DAILY
Start: 2018-05-03

## 2018-05-02 RX ADMIN — INSULIN DETEMIR 15 UNITS: 100 INJECTION, SOLUTION SUBCUTANEOUS at 08:54

## 2018-05-02 RX ADMIN — LINEZOLID 600 MG: 2 INJECTION, SOLUTION INTRAVENOUS at 08:52

## 2018-05-02 RX ADMIN — OXCARBAZEPINE 900 MG: 300 TABLET, FILM COATED ORAL at 08:53

## 2018-05-02 RX ADMIN — LEVETIRACETAM 750 MG: 100 SOLUTION ORAL at 08:54

## 2018-05-02 RX ADMIN — DANTROLENE SODIUM 25 MG: 25 CAPSULE ORAL at 08:53

## 2018-05-02 RX ADMIN — SODIUM HYPOCHLORITE: 1.25 SOLUTION TOPICAL at 08:55

## 2018-05-02 RX ADMIN — INSULIN ASPART 6 UNITS: 100 INJECTION, SOLUTION INTRAVENOUS; SUBCUTANEOUS at 11:35

## 2018-05-02 RX ADMIN — HYDROCORTISONE SODIUM SUCCINATE 50 MG: 100 INJECTION, POWDER, FOR SOLUTION INTRAMUSCULAR; INTRAVENOUS at 14:07

## 2018-05-02 RX ADMIN — MAGNESIUM SULFATE HEPTAHYDRATE 4 G: 40 INJECTION, SOLUTION INTRAVENOUS at 08:52

## 2018-05-02 RX ADMIN — HYDROCORTISONE SODIUM SUCCINATE 50 MG: 100 INJECTION, POWDER, FOR SOLUTION INTRAMUSCULAR; INTRAVENOUS at 05:16

## 2018-05-02 RX ADMIN — CHLORHEXIDINE GLUCONATE 15 ML: 1.2 RINSE ORAL at 08:53

## 2018-05-02 RX ADMIN — Medication 1 CAPSULE: at 08:53

## 2018-05-02 RX ADMIN — IPRATROPIUM BROMIDE AND ALBUTEROL SULFATE 3 ML: .5; 3 SOLUTION RESPIRATORY (INHALATION) at 00:48

## 2018-05-02 RX ADMIN — CASTOR OIL AND BALSAM, PERU: 788; 87 OINTMENT TOPICAL at 09:00

## 2018-05-02 RX ADMIN — PANTOPRAZOLE SODIUM 40 MG: 40 INJECTION, POWDER, FOR SOLUTION INTRAVENOUS at 05:16

## 2018-05-02 RX ADMIN — POTASSIUM PHOSPHATE, MONOBASIC AND POTASSIUM PHOSPHATE, DIBASIC 20 MMOL: 224; 236 INJECTION, SOLUTION INTRAVENOUS at 13:52

## 2018-05-02 RX ADMIN — HYOSCYAMINE SULFATE 125 MCG: 0.12 TABLET ORAL at 08:53

## 2018-05-02 RX ADMIN — HYOSCYAMINE SULFATE 125 MCG: 0.12 TABLET ORAL at 00:23

## 2018-05-02 RX ADMIN — IPRATROPIUM BROMIDE AND ALBUTEROL SULFATE 3 ML: .5; 3 SOLUTION RESPIRATORY (INHALATION) at 07:03

## 2018-05-02 RX ADMIN — FINASTERIDE 5 MG: 5 TABLET, FILM COATED ORAL at 08:53

## 2018-05-02 RX ADMIN — INSULIN ASPART 4 UNITS: 100 INJECTION, SOLUTION INTRAVENOUS; SUBCUTANEOUS at 06:15

## 2018-05-02 RX ADMIN — SODIUM CHLORIDE, PRESERVATIVE FREE 300 UNITS: 5 INJECTION INTRAVENOUS at 08:54

## 2018-05-02 RX ADMIN — HYOSCYAMINE SULFATE 125 MCG: 0.12 TABLET ORAL at 05:16

## 2018-05-02 RX ADMIN — POTASSIUM CHLORIDE 40 MEQ: 1.5 POWDER, FOR SOLUTION ORAL at 02:04

## 2018-05-02 RX ADMIN — HYOSCYAMINE SULFATE 125 MCG: 0.12 TABLET ORAL at 12:52

## 2018-05-02 RX ADMIN — LEVOTHYROXINE SODIUM 75 MCG: 75 TABLET ORAL at 06:15

## 2018-05-02 RX ADMIN — HEPARIN SODIUM 5000 UNITS: 5000 INJECTION, SOLUTION INTRAVENOUS; SUBCUTANEOUS at 08:53

## 2018-05-02 RX ADMIN — Medication 10 ML: at 08:55

## 2018-05-02 RX ADMIN — ACETYLCYSTEINE 3 ML: 200 SOLUTION ORAL; RESPIRATORY (INHALATION) at 07:03

## 2018-05-02 RX ADMIN — IPRATROPIUM BROMIDE AND ALBUTEROL SULFATE 3 ML: .5; 3 SOLUTION RESPIRATORY (INHALATION) at 12:56

## 2018-05-02 NOTE — PLAN OF CARE
Problem: Patient Care Overview  Goal: Plan of Care Review  Outcome: Ongoing (interventions implemented as appropriate)      Problem: Ventilation, Mechanical Invasive (Adult)  Goal: Signs and Symptoms of Listed Potential Problems Will be Absent, Minimized or Managed (Ventilation, Mechanical Invasive)  Outcome: Ongoing (interventions implemented as appropriate)      Problem: Diabetes, Type 2 (Adult)  Goal: Signs and Symptoms of Listed Potential Problems Will be Absent, Minimized or Managed (Diabetes, Type 2)  Outcome: Ongoing (interventions implemented as appropriate)

## 2018-05-02 NOTE — PROGRESS NOTES
Discharge Planning Assessment  FREDERICK Daily     Patient Name: Dragan Camara  MRN: [id number]  Today's Date: 5/2/2018    Admit Date: 4/11/2018         Discharge Plan     Row Name 05/02/18 1142       Plan    Final Discharge Disposition Code 63 - [facility]    Final Note Pt being discharged to [facility] on this date.   faxed AVS and Order to [facility] at [phone].  No other needs at this time.         Expected Discharge Date and Time     Expected Discharge Date Expected Discharge Time    May 2, 2018           Jamila Riddle

## 2018-05-02 NOTE — PROGRESS NOTES
"  I have personally seen and examined the patient today and discussed overnight interval progress and pertinent issues with nursing staff.    Subjective:    Continues with diarrhea on tube feed.  Fever resolved off of cefepime.  Mother at bedside.    History taken from: chart family RN      Vital Signs    /89   Pulse 82   Temp 98.3 °F (36.8 °C) (Axillary)   Resp 18   Ht 177.8 cm (70\")   Wt 73.2 kg (161 lb 4.8 oz)   SpO2 100%   BMI 23.14 kg/m²     Temp:  [97.7 °F (36.5 °C)-99.3 °F (37.4 °C)] 98.3 °F (36.8 °C)      Intake/Output Summary (Last 24 hours) at 05/02/18 1137  Last data filed at 05/02/18 0852   Gross per 24 hour   Intake             2183 ml   Output             1230 ml   Net              953 ml     Intake & Output (last 3 days)       04/29 0701 - 04/30 0700 04/30 0701 - 05/01 0700 05/01 0701 - 05/02 0700 05/02 0701 - 05/03 0700    I.V. (mL/kg)  100 (1.4)      Other 180 90 200 30    NG/GT  432 1353     IV Piggyback 300 1000 700 300    Total Intake(mL/kg) 480 (6.9) 1622 (22.5) 2253 (30.8) 330 (4.5)    Urine (mL/kg/hr) 1390 (0.8) 1475 (0.9) 1385 (0.8)     Emesis/NG output        Stool 0 (0) 0 (0) 0 (0)     Total Output 1390 1475 1385      Net -910 +147 +868 +330            Unmeasured Stool Occurrence 3 x 1 x 5 x           Physical exam:    General Appearance:    Ventilated via trach    Head:    Normocephalic, without obvious abnormality, atraumatic   Eyes:            Lids and lashes normal, conjunctivae and sclerae normal, no   icterus, no pallor, corneas clear, PERRLA   Ears:    Ears appear intact with no abnormalities noted   Throat:   No oral lesions, no thrush, oral mucosa moist   Neck:   No adenopathy, supple, trachea midline, no thyromegaly, no   carotid bruit, no JVD   Back:     No tenderness to percussion or palpation, range of motion   normal   Lungs:     Clear to auscultation,respirations regular, even and unlabored. No wheezing, no ronchi and no crackles.    Heart:    Regular rhythm and " "normal rate, normal S1 and S2, no            murmur, no gallop, no rub, no click   Chest Wall:    No abnormalities observed   Abdomen:     Normal bowel sounds, no masses, no organomegaly, soft        non-tender, non-distended, no guarding, no rebound                tenderness   Rectal:     Deferred   Extremities:   Cerebral palsy, contractures   Pulses:   Pulses palpable and equal bilaterally   Skin:   No bleeding, bruising or rash   Lymph nodes:   No palpable adenopathy   Neurologic:   Ventilated sedated, quadraplegia           Results:      Results from last 7 days  Lab Units 05/02/18  0533 05/01/18  0048 04/30/18  0052 04/29/18  0041 04/28/18  0049 04/27/18  0040 04/26/18  0043   WBC 10*3/mm3 12.14 11.43 16.76* 16.69* 16.21* 11.78 23.42*     Lab Results   Component Value Date    NEUTROABS 8.67 (H) 05/02/2018         Results from last 7 days  Lab Units 05/02/18 0533   CREATININE mg/dL 0.38*         Results from last 7 days  Lab Units 05/02/18  0533 05/01/18 0048 04/30/18 0052   CRP mg/dL 1.54* 4.46* 4.50*     Results Review:    I have personally reviewed laboratory data, culture results, radiology studies and antimicrobial therapy.    Hospital Medications (active)       Dose Frequency Start End    acetaminophen (TYLENOL) suppository 650 mg 650 mg Every 6 Hours 4/15/2018     Sig - Route: Insert 1 suppository into the rectum Every 6 (Six) Hours. - Rectal    Linked Group 1:  \"Or\" Linked Group Details        acetaminophen (TYLENOL) tablet 650 mg 650 mg Every 6 Hours 4/15/2018     Sig - Route: Take 2 tablets by mouth Every 6 (Six) Hours. - Oral    Linked Group 1:  \"Or\" Linked Group Details        atorvastatin (LIPITOR) tablet 10 mg 10 mg Nightly 4/11/2018     Sig - Route: Take 1 tablet by mouth Every Night. - Oral    baclofen (LIORESAL) tablet 20 mg 20 mg User Specified 4/11/2018     Sig - Route: Take 2 tablets by mouth 2 times per day. - Oral    castor oil-balsam peru (VENELEX) ointment  Every 12 Hours Scheduled " 4/12/2018     Sig - Route: Apply  topically Every 12 (Twelve) Hours. - Topical    cefepime (MAXIPIME) 2 g/100 mL 0.9% NS (mbp) 2 g Once 4/15/2018 4/15/2018    Sig - Route: Infuse 100 mL into a venous catheter 1 (One) Time. - Intravenous    cefepime (MAXIPIME) 2 g/100 mL 0.9% NS (mbp) 2 g Every 8 Hours 4/16/2018 4/21/2018    Sig - Route: Infuse 100 mL into a venous catheter Every 8 (Eight) Hours. - Intravenous    chlorhexidine (PERIDEX) 0.12 % solution 15 mL 15 mL Every 12 Hours Scheduled 4/12/2018     Sig - Route: Apply 15 mL to the mouth or throat Every 12 (Twelve) Hours. - Mouth/Throat    citalopram (CeleXA) tablet 20 mg 20 mg Nightly 4/11/2018     Sig - Route: Take 1 tablet by mouth Every Night. - Oral    dantrolene (DANTRIUM) capsule 25 mg 25 mg 2 Times Daily 4/11/2018     Sig - Route: Take 1 capsule by mouth 2 (Two) Times a Day. - Oral    dexmedetomidine HCl (PRECEDEX) 400 mcg in sodium chloride 0.9 % 100 mL (4 mcg/mL) infusion 0.2-1.5 mcg/kg/hr × 71.7 kg Titrated 4/13/2018     Sig - Route: Infuse 14..55 mcg/hr into a venous catheter Dose Adjusted By Provider As Needed. - Intravenous    dextrose (D50W) solution 25 g 25 g Every 15 Minutes PRN 4/11/2018     Sig - Route: Infuse 50 mL into a venous catheter Every 15 (Fifteen) Minutes As Needed for Low Blood Sugar (Blood Sugar Less Than 70). - Intravenous    dextrose (GLUTOSE) oral gel 15 g 15 g Every 15 Minutes PRN 4/11/2018     Sig - Route: Take 15 g by mouth Every 15 (Fifteen) Minutes As Needed for Low Blood Sugar (Blood sugar less than 70). - Oral    furosemide (LASIX) injection 40 mg 40 mg Once 4/16/2018 4/16/2018    Sig - Route: Infuse 4 mL into a venous catheter 1 (One) Time. - Intravenous    glucagon (human recombinant) (GLUCAGEN DIAGNOSTIC) injection 1 mg 1 mg As Needed 4/11/2018     Sig - Route: Inject 1 mg under the skin As Needed (Blood Glucose Less Than 70). - Subcutaneous    heparin (porcine) 5000 UNIT/ML injection 5,000 Units 5,000 Units Every  12 Hours Scheduled 4/11/2018     Sig - Route: Inject 1 mL under the skin Every 12 (Twelve) Hours. - Subcutaneous    heparin flush (porcine) 100 UNIT/ML injection 300 Units 3 mL Every 12 Hours Scheduled 4/12/2018     Sig - Route: 3 mL by Intracatheter route Every 12 (Twelve) Hours. - Intracatheter    heparin flush (porcine) 100 UNIT/ML injection 300 Units 3 mL As Needed 4/11/2018     Sig - Route: 3 mL by Intracatheter route As Needed for Line Care (After Medication Administration or Blood Draw If Not Going to Be Utilized Immediately). - Intracatheter    ibuprofen (ADVIL,MOTRIN) tablet 600 mg 600 mg Every 6 Hours PRN 4/14/2018     Sig - Route: Take 1 tablet by mouth Every 6 (Six) Hours As Needed for Mild Pain . - Oral    insulin aspart (novoLOG) injection 0-7 Units 0-7 Units 4 Times Daily Before Meals & Nightly 4/11/2018     Sig - Route: Inject 0-7 Units under the skin 4 (Four) Times a Day Before Meals & at Bedtime. - Subcutaneous    ipratropium-albuterol (DUO-NEB) nebulizer solution 3 mL 3 mL Every 6 Hours - RT 4/11/2018     Sig - Route: Take 3 mL by nebulization Every 6 (Six) Hours. - Nebulization    lactobacillus acidophilus (RISAQUAD) capsule 1 capsule 1 capsule Daily 4/12/2018     Sig - Route: Take 1 capsule by mouth Daily. - Oral    levETIRAcetam (KEPPRA) 100 MG/ML solution 750 mg 750 mg Every 12 Hours Scheduled 4/12/2018     Sig - Route: Take 7.5 mL by mouth Every 12 (Twelve) Hours. - Oral    levothyroxine (SYNTHROID, LEVOTHROID) tablet 75 mcg 75 mcg Every Morning 4/12/2018     Sig - Route: Take 1 tablet by mouth Every Morning. - Oral    metroNIDAZOLE (FLAGYL) IVPB 500 mg 500 mg Every 8 Hours 4/15/2018 4/20/2018    Sig - Route: Infuse 100 mL into a venous catheter Every 8 (Eight) Hours. - Intravenous    ondansetron (ZOFRAN) injection 4 mg 4 mg Every 6 Hours PRN 4/11/2018     Sig - Route: Infuse 2 mL into a venous catheter Every 6 (Six) Hours As Needed for Nausea or Vomiting. - Intravenous    OXcarbazepine  "(TRILEPTAL) tablet 900 mg 900 mg 2 Times Daily 4/11/2018     Sig - Route: Take 3 tablets by mouth 2 (Two) Times a Day. - Oral    pantoprazole (PROTONIX) injection 40 mg 40 mg Every Early Morning 4/11/2018     Sig - Route: Infuse 10 mL into a venous catheter Every Morning. - Intravenous    potassium chloride (KLOR-CON) packet 40 mEq 40 mEq As Needed 4/13/2018     Sig - Route: Take 40 mEq by mouth As Needed (potassium replacement, see admin instructions). - Oral    Linked Group 2:  \"Or\" Linked Group Details        potassium chloride (KLOR-CON) packet 40 mEq 40 mEq Once 4/16/2018 4/16/2018    Sig - Route: Take 40 mEq by mouth 1 (One) Time. - Oral    potassium chloride (MICRO-K) CR capsule 40 mEq 40 mEq As Needed 4/13/2018     Sig - Route: Take 4 capsules by mouth As Needed (potassium replacement.  see admin instructions). - Oral    Linked Group 2:  \"Or\" Linked Group Details        potassium chloride 10 mEq in 100 mL IVPB 10 mEq Every 1 Hour PRN 4/13/2018     Sig - Route: Infuse 100 mL into a venous catheter Every 1 (One) Hour As Needed (potassium protocol PERIPHERAL - see admin instructions). - Intravenous    Linked Group 2:  \"Or\" Linked Group Details        sodium chloride 0.9 % flush 1-10 mL 1-10 mL As Needed 4/11/2018     Sig - Route: Infuse 1-10 mL into a venous catheter As Needed for Line Care. - Intravenous    sodium chloride 0.9 % flush 10 mL 10 mL As Needed 4/11/2018     Sig - Route: Infuse 10 mL into a venous catheter As Needed for Line Care. - Intravenous    Linked Group 3:  \"And\" Linked Group Details        sodium chloride 0.9 % flush 10 mL 10 mL As Needed 4/11/2018     Sig - Route: Infuse 10 mL into a venous catheter As Needed for Line Care. - Intravenous    Linked Group 4:  \"And\" Linked Group Details        sodium chloride 0.9 % flush 10 mL 10 mL Every 12 Hours Scheduled 4/12/2018     Sig - Route: 10 mL by Intracatheter route Every 12 (Twelve) Hours. - Intracatheter    sodium chloride 0.9 % flush 10 mL 10 " mL As Needed 4/11/2018     Sig - Route: 10 mL by Intracatheter route As Needed for Line Care (After Medication Administration or Blood Draw). - Intracatheter    sodium hypochlorite (DAKIN'S 1/4 STRENGTH) 0.125 % topical solution 0.125% solution  2 Times Daily 4/12/2018     Sig - Route: Apply  topically 2 (Two) Times a Day. - Topical    vancomycin (VANCOCIN) 1,000 mg in sodium chloride 0.9 % 250 mL IVPB 1,000 mg Every 12 Hours 4/12/2018 4/19/2018    Sig - Route: Infuse 1,000 mg into a venous catheter Every 12 (Twelve) Hours. - Intravenous    aztreonam (AZACTAM) 2 g/100 mL 0.9% NS (mbp) (Discontinued) 2 g Every 8 Hours 4/12/2018 4/15/2018    Sig - Route: Infuse 100 mL into a venous catheter Every 8 (Eight) Hours. - Intravenous    Pharmacy Consult - Pharmacy to dose (Discontinued)  Continuous PRN 4/15/2018 4/15/2018    Sig - Route: Continuous As Needed for Consult. - Does not apply    Reason for Discontinue: Dose adjustment    propofol (DIPRIVAN) infusion 10 mg/mL 100 mL (Discontinued) 5-50 mcg/kg/min × 68 kg Titrated 4/11/2018 4/16/2018    Sig - Route: Infuse 340-3,400 mcg/min into a venous catheter Dose Adjusted By Provider As Needed. - Intravenous    sodium chloride 0.9 % infusion (Discontinued) 125 mL/hr Continuous 4/11/2018 4/16/2018    Sig - Route: Infuse 125 mL/hr into a venous catheter Continuous. - Intravenous            Cultures:    Blood Culture   Date Value Ref Range Status   04/15/2018 No growth at less than 24 hours  Preliminary   04/15/2018 No growth at 24 hours  Preliminary   04/11/2018 No growth at 4 days  Preliminary   04/11/2018 No growth at 4 days  Preliminary     Urine Culture   Date Value Ref Range Status   04/11/2018 >100,000 CFU/mL Normal Urogenital Lachelle  Final     Respiratory Culture   Date Value Ref Range Status   04/12/2018 (A)  Preliminary    Scant growth (1+) Gram positive cocci, consistent with Staph spp   04/12/2018 (A)  Preliminary    Scant growth (1+) Gram positive cocci, consistent  with Staph spp   04/12/2018 (A)  Preliminary    Light growth (2+) Gram positive cocci, consistent with Staph spp           Assessment/Plan     ASSESSMENT:       1.  Severe sepsis with acute respiratory failure requiring mechanical ventilation  2.  Aspiration pneumonia    PLAN:    Continues with diarrhea on tube feed.  Fever resolved off of cefepime.  Mother at bedside.     Cefepime was added as an allergy with drug fever.    Continue Zyvox 600 mg IV every 12 hours and IV Flagyl through 5/15/18 as patient continues to be at high risk for MRSA pneumonia and aspiration pneumonia .    Celexa 20 mg daily was DC'd at this time.  Would recommend to restart Celexa when Zyvox therapy has completed.     We'll continue to follow finalization of cultures and adjust antibiotic therapy if indicated.    CRP in a.m.    Patient's findings and recommendations were discussed with family and nursing staff    Code Status: Full Code     Scribed for Dr. Patrick Bryant.    JOAQUIM Fairbanks  05/02/18  11:37 AM       Physician Attestation:    The documentation recorded by the scribe accurately reflects the service I personally performed and the decisions made by me.    Patrick Bryant MD  Infectious Diseases  05/02/18  11:43 AM

## 2018-05-02 NOTE — PROGRESS NOTES
LOS: 21 days     Chief Complaint:  Pulmonology is following for respiratory failure    Subjective     Interval History: Mr. Camara is resting in bed.  No acute distress noted.    History taken from: chart RN    Review of Systems:   Review of Systems - History unobtainable from patient due to mental status                    Objective     Vital Signs  Temp:  [97.7 °F (36.5 °C)-99.3 °F (37.4 °C)] 98.3 °F (36.8 °C)  Heart Rate:  [] 82  Resp:  [18-32] 18  BP: ()/(63-94) 128/89  FiO2 (%):  [40 %-50 %] 40 %  Body mass index is 23.14 kg/m².    Intake/Output Summary (Last 24 hours) at 05/02/18 1051  Last data filed at 05/02/18 0852   Gross per 24 hour   Intake             2283 ml   Output             1230 ml   Net             1053 ml     I/O this shift:  In: 330 [Other:30; IV Piggyback:300]  Out: -     Physical Exam:  GENERAL APPEARANCE: chronically ill appearing, muscular deformity due to cerebral palsy.  No acute distress noted.    HEAD: normocephalic.    EYES: PERRL    NECK: Neck supple. Tracheostomy noted.    CARDIAC: Normal S1 and S2. No S3, S4 or murmurs. Rhythm is regular. There is no peripheral edema, cyanosis or pallor. Extremities are warm and well perfused. Capillary refill is less than 2 seconds. No carotid bruits.    RESPIRATORY: Clear to auscultation and percussion without rales, rhonchi, wheezing or diminished breath sounds.    GI: Positive bowel sounds. Soft, nondistended, nontender.     MUSCULOSKELTAL:  muscular deformity due to cerebral palsy.    NEUROLOGICAL: Unable to assess due to mental status.    PSYCHIATRIC: Unable to assess due to mental status.                Results Review:                I reviewed the patient's new clinical results.  I reviewed the patient's new imaging results and agree with the interpretation.    Results from last 7 days  Lab Units 05/02/18  0533 05/01/18  0048 04/30/18  0052   WBC 10*3/mm3 12.14 11.43 16.76*   HEMOGLOBIN g/dL 11.2* 11.9* 12.6*   PLATELETS 10*3/mm3  254 243 380       Results from last 7 days  Lab Units 05/02/18  0533 05/01/18  1927 05/01/18  1537 05/01/18  0815 05/01/18  0048  04/30/18  0052   SODIUM mmol/L 133*  --   --   --  134*  --  133*   POTASSIUM mmol/L 4.3 3.5  --   --  3.6  --  3.5   CHLORIDE mmol/L 99  --   --   --  97*  --  97*   CO2 mmol/L 27.2  --   --   --  26.1  --  26.2   BUN mg/dL 12  --   --   --  6*  --  5*   CREATININE mg/dL 0.38*  --   --   --  0.32*  --  0.33*   CALCIUM mg/dL 8.8  --   --   --  8.5  --  8.4   GLUCOSE mg/dL 247*  --   --   --  198*  --  107   MAGNESIUM mg/dL 1.8  --  1.9 2.2 2.4  < > 2.0   < > = values in this interval not displayed.  No results found for: INR, PROTIME        Invalid input(s): PROT, LABALBU    Results from last 7 days  Lab Units 04/29/18  0941   PH, ARTERIAL pH units 7.474*   PO2 ART mm Hg 76.0*   PCO2, ARTERIAL mm Hg 35.8   HCO3 ART mmol/L 25.7     Imaging Results (last 24 hours)     ** No results found for the last 24 hours. **             Medication Review:   Scheduled Medications:    acetylcysteine 3 mL Inhalation BID - RT   atorvastatin 10 mg Oral Nightly   baclofen 20 mg Oral 2 times per day   castor oil-balsam peru  Topical Q12H   chlorhexidine 15 mL Mouth/Throat Q12H   dantrolene 25 mg Oral BID   finasteride 5 mg Oral Daily   heparin (porcine) 5,000 Units Subcutaneous Q12H   heparin flush (porcine) 3 mL Intracatheter Q12H   hydrocortisone sodium succinate 50 mg Intravenous Q8H   hyoscyamine 125 mcg Sublingual Q4H   insulin aspart 0-9 Units Subcutaneous 4x Daily AC & at Bedtime   insulin detemir 15 Units Subcutaneous Daily   ipratropium-albuterol 3 mL Nebulization Q6H - RT   lactobacillus acidophilus 1 capsule Oral Daily   levETIRAcetam 750 mg Oral Q12H   levothyroxine 75 mcg Oral QAM   Linezolid 600 mg Intravenous Q12H   magnesium sulfate 4 g Intravenous Once   OXcarbazepine 900 mg Oral BID   pantoprazole 40 mg Intravenous Q AM   sodium chloride 10 mL Intracatheter Q12H   sodium hypochlorite   Topical BID     Continuous infusions:    dexmedetomidine 0.2-1.5 mcg/kg/hr Last Rate: Stopped (04/27/18 0820)   fentaNYL (SUBLIMAZE) PCA 1500 mcg/30 mL syringe     norepinephrine 0.02-0.3 mcg/kg/min Last Rate: Stopped (04/30/18 1700)   propofol 5-50 mcg/kg/min Last Rate: 10 mcg/kg/min (04/27/18 1748)       Assessment/Plan        Neuro: Mental status remains at baseline.     Respiratory Failure-Hypoxic: likely related to aspiration pna.     Tracheostomy placed on 4/27/18.    Will try a pressure support trial again today.  Ordered lasix 40 mg IV and potassium replacement.  Continue current vent settings.    Hypotension: resolved.  BP within normal range.  NSR.  Continuous ECG, BP and pulse ox monitoring. Maintain MAP > 65.     Renal: Creatinine is 0.38.  Strict I&Os.   Continue to monitor.     GI: Continue GI prophylaxis and tube feedings.     DVT prophylaxis: heparin BID.     IV access: [place]ong left chest wall. Mother reports this is his third line, was placed in September and can keep up to 1 year.      Decubitus: does have a large decubitus ulcer on coccyx, primary is managing.      ID: Leukocytosis, Febrile: no fever. WBC is 12.14, neutrophils are 71.  Continue to monitor. Continue antibiotic management per infectious disease.     Endocrine: monitor glucose targeting 140-180.    Hypomagnesemia:  Level 1.8. Replaced, continue to monitor.    Hypophosphatemia:  Level 1.6. Replaced, continue to monitor.    Will go to [facility] care hospital today.     Microbiology Results (last 10 days)     Procedure Component Value - Date/Time    Clostridium Difficile Toxin - Stool, Per Rectum [[id number]] Collected:  04/29/18 2014    Lab Status:  Final result Specimen:  Stool from Per Rectum Updated:  04/29/18 2202    Narrative:       The following orders were created for panel order Clostridium Difficile Toxin - Stool, Per Rectum.  Procedure                               Abnormality         Status                     ---------                                -----------         ------                     Clostridium Difficile To...[[id number]]                      Final result                 Please view results for these tests on the individual orders.    Clostridium Difficile Toxin, PCR - Stool, Per Rectum [[id number]] Collected:  04/29/18 2014    Lab Status:  Final result Specimen:  Stool from Per Rectum Updated:  04/29/18 2202     C. Difficile Toxins by PCR Negative     027 Toxin Presumptive Negative    Narrative:         For In Vitro Diagnostic use only.  027-NAP1-BI results are NOT intended to guide treatment. 027 typing is relative to PCR ribotyping and shown to be equivalent to B1 typing by restriction endonuclease analysis or NAP1 typing by pulse field gel electrophoresis.    Blood Culture - Blood, [[id number]]  (Normal) Collected:  04/26/18 1009    Lab Status:  Final result Specimen:  Blood from Arm, Right Updated:  05/01/18 1045     Blood Culture No growth at 5 days    Blood Culture - Blood, [[id number]]  (Normal) Collected:  04/26/18 0815    Lab Status:  Final result Specimen:  Blood from Arm, Right Updated:  05/01/18 1045     Blood Culture No growth at 5 days    Respiratory Culture - Sputum, Lung, Right Lower Lobe [[id number]] Collected:  04/25/18 1922    Lab Status:  Final result Specimen:  Sputum from Lung, Right Lower Lobe Updated:  04/28/18 0917     Respiratory Culture No growth     Gram Stain Result Occasional WBCs seen      No organisms seen    Respiratory Culture - Sputum, Lung, Left Lower Lobe [[id number]] Collected:  04/25/18 1652    Lab Status:  Final result Specimen:  Sputum from Lung, Left Lower Lobe Updated:  04/28/18 0917     Respiratory Culture No growth     Gram Stain Result Occasional WBCs seen      No organisms seen            Patient Active Problem List   Diagnosis Code   • Acute respiratory failure with hypoxia J96.01   • Severe sepsis A41.9, R65.20   • Aspiration pneumonia J69.0             Kathy Hyman  JOAQUIM  05/02/18  10:51 AM        Attestation: Scribed for Dr. Chen, by JOAQUIM Atwood, Leonardo Chen M.D. attest that the above note accurately reflects the work and decisions made  by me.  Patient was seen and evaluated by Dr. Chen, including history of present illness, physical exam, assessment, and treatment plan.  The above note was reviewed and edited by Dr. Chen. Cc time 34 minutes

## 2018-05-02 NOTE — PLAN OF CARE
Problem: Fall Risk (Adult)  Goal: Absence of Fall  Outcome: Ongoing (interventions implemented as appropriate)      Problem: Patient Care Overview  Goal: Plan of Care Review  Outcome: Ongoing (interventions implemented as appropriate)    Goal: Discharge Needs Assessment  Outcome: Ongoing (interventions implemented as appropriate)      Problem: Ventilation, Mechanical Invasive (Adult)  Goal: Signs and Symptoms of Listed Potential Problems Will be Absent, Minimized or Managed (Ventilation, Mechanical Invasive)  Outcome: Ongoing (interventions implemented as appropriate)    Goal: Signs and Symptoms of Listed Potential Problems Will be Absent, Minimized or Managed (Ventilation, Mechanical Invasive)  Outcome: Ongoing (interventions implemented as appropriate)      Problem: Infection, Risk/Actual (Adult)  Goal: Infection Prevention/Resolution  Outcome: Ongoing (interventions implemented as appropriate)

## 2018-05-02 NOTE — DISCHARGE INSTR - APPOINTMENTS
Appointments would need to be scheduled for when patient leaves [facility].

## 2018-05-02 NOTE — DISCHARGE SUMMARY
[facility] HOSPITALIST MEDICINE DISCHARGE SUMMARY    Patient Identification:  Name:  Dragan Camara  Age:  43 y.o.  Sex:  male  :  1975  MRN:  [id number]  Visit Number:  [id number]    Date of Admission: 2018  Date of Discharge:  2018     PCP: Neo Tompkins MD    DISCHARGE DIAGNOSIS   Acute respiratory failure with hypoxia  Severe sepsis  Aspiration pneumonia  Acute hypoxic respiratory failure secondary to sepsis and secretions  Possible aspiration pneumonia  Likely UTI  Sepsis  Persistent fever   Hyponatremia  Diabetes mellitus type 2  Cerebral palsy  Decubitus ulcer  Seizure disorder    CONSULTS   Dr. Leonardo Valenzuela Dr, Nhan Kfaranzay       PROCEDURES PERFORMED   Bronchoscopy by Dr. Chen  Intubation x 2.      HOSPITAL COURSE  Patient is a 43-year-old  male with history of cerebral palsy presented to [facility] complaining of cough and shortness of breath on .  Please see the admitting history and physical for further details.      With an impression of acute respiratory failure with hypoxia possibly secondary to aspiration versus PE the patient was intubated and subsequently admitted to CCU.  A pulmonary consult from Dr. Chen was requested.  Patient was started on broad-spectrum IV antibiotics including vancomycin and aztreonam. Pt antibiotic has been escalated to cefepime from azithromycin.     Pt has had bronchoscopy.  Had a spike of fever on the first day of admission.  Has been on antibiotics.  Had another spike last night.  Of note patient has been having recurrent urinary tract infection cultures showed that he has Enterococcus faecalis exact reason why he is having it is not known.  Recommend patient work up with cystoscopy on discharge by urologist.  Has a triple lumen catheter in the left anterior chest wall has been there for more than 3 months blood cultures are negative    18:  He has been unable to come off the  ventilator due to secretions Vs due to lung non-compliance secondary to fluid overload.  He has had a persistent fever with a normal CRP and white count.  ID thinks it may be drug fever.  Cefepime was stopped today and will monitor his temp curve.  He also probably has a UTI.  Additionally his sodium has been low, originally thought to be due to hyperglycemia since he was on insulin drip.  Nephrology was consulted .  Hydrocortisone has been started. Hyponatremia was thought to be secondary to NSAIDs and intravascular fluid volume from Furosemide.     4/23/18: Pt was able to be extubated and IV Cefepime was planned to be discontinued. But later in the day he became more dyspneic and was placed on BIPAP.     Pt ws thought to be in baseline on 4/24 and he was given IV Lasix. His CRP was less than 0.5 with new onset Leukocytosis but thought it could be secondary to steroid. He was continued with IV Zyvox and Flagyl.     On 4/25/18:Pt was noted to be in more respiratory distress and has thick secretions. He underwent diagnositic bronchoscopy. Left lower lobe BAL and right middle lobe BAL was done. And patient subsecuently was intubated.     The following several days has shown improvement in his respiratory status but he was unable to come off the vent and failed weaning parameter. It was recommended for patient to go LTAC but family refused to go outside town. Finally on 5/2/18 patient ws accepted on Continued care at [facility] and he was transferred there. He will be followed by Annette Ortiz and Bianca over there.     See discharge instructions.         VITAL SIGNS:  1    04/30/18  0510 05/01/18  0400 05/02/18  0500   Weight: 69.5 kg (153 lb 4.8 oz) 72.1 kg (159 lb) 73.2 kg (161 lb 4.8 oz)     Body mass index is 23.14 kg/m².    PHYSICAL EXAM:  Constitutional:  Cp.  No respiratory distress. Currently on pressure support 12 and FiO2 of 50%      HENT:  Head:  Normocephalic and atraumatic.  Mouth:   Moist mucous membranes.    Eyes:  Conjunctivae and EOM are normal.  Pupils are equal, round, and reactive to light.  No scleral icterus.    Neck:  Neck supple.  No JVD present.  Trach  Cardiovascular:  Normal rate, regular rhythm and normal heart sounds with no murmur.  Pulmonary/Chest:  No respiratory distress, no wheezes, no crackles, with normal breath sounds and good air movement.  Abdominal:  Soft.  Bowel sounds are normal.  No distension and no tenderness. G-tube in place.  Musculoskeletal:  No edema, no tenderness, and no deformity.  No red or swollen joints anywhere.  contractures  Neurological: awake and alert.   Skin:  Skin is warm and dry. No rash noted. No pallor.   Peripheral vascular:  Strong pulses in all 4 extremities with no clubbing, no cyanosis, no edema  DISCHARGE DISPOSITION   Stable    DISCHARGE MEDICATIONS:   Lay, Lendon   Home Medication Instructions YANELIS:[id number]    Printed on:05/02/18 4385   Medication Information                      baclofen (LIORESAL) 20 MG tablet  Take 20 mg by mouth 2 (Two) Times a Day. 1800 & Bedtime             citalopram (CeleXA) 20 MG tablet  Take 20 mg by mouth Every Night.             dantrolene (DANTRIUM) 25 MG capsule  Take 25 mg by mouth 2 (Two) Times a Day.             finasteride (PROSCAR) 5 MG tablet  Take 1 tablet by mouth Daily.             levETIRAcetam (KEPPRA) 750 MG tablet  Take 750 mg by mouth 2 (Two) Times a Day.             levothyroxine (SYNTHROID, LEVOTHROID) 75 MCG tablet  Take 75 mcg by mouth Every Morning.             megestrol (MEGACE) 40 MG/ML suspension  Take 400 mg by mouth 2 (Two) Times a Day.             melatonin 5 MG tablet tablet  Take 10 mg by mouth Every Night.             OXcarbazepine (TRILEPTAL) 300 MG tablet  Take 900 mg by mouth 2 (Two) Times a Day.             pantoprazole (PROTONIX) 40 MG EC tablet  Take 40 mg by mouth Every Morning.             pravastatin (PRAVACHOL) 20 MG tablet  Take 20 mg by mouth Every Night.                        Follow-up Information     Sea Palomino MD Follow up in 1 month(s).    Specialty:  Urology  Contact information:  [address] [facility] [address]  [facility] [address] [phone]                   TEST  RESULTS PENDING AT DISCHARGE     This discharge summary required more than 45 minutes to complete.         Kenneth Bansal MD  05/02/18  8:56 AM

## 2018-05-02 NOTE — PROGRESS NOTES
Discharge Planning Assessment   Killian     Patient Name: Dragan Camara  MRN: [id number]  Today's Date: 5/2/2018    Admit Date: 4/11/2018         Discharge Plan     Row Name 05/02/18 0824       Plan    Plan SS received call from Marisa at [facility] and the peer to peer has been approved.  Pt has been accepted and Dr. Mayberry is agreeable to be primary.  SS will continue to follow.            Expected Discharge Date and Time     Expected Discharge Date Expected Discharge Time    May 2, 2018                  Jamila Riddle

## 2018-05-03 ENCOUNTER — APPOINTMENT (OUTPATIENT)
Dept: GENERAL RADIOLOGY | Facility: HOSPITAL | Age: 43
End: 2018-05-03

## 2018-05-03 ENCOUNTER — OUTSIDE FACILITY SERVICE (OUTPATIENT)
Dept: PULMONOLOGY | Facility: CLINIC | Age: 43
End: 2018-05-03

## 2018-05-03 LAB
ANION GAP SERPL CALCULATED.3IONS-SCNC: 11.5 MMOL/L (ref 3.6–11.2)
BASOPHILS # BLD AUTO: 0.05 10*3/MM3 (ref 0–0.3)
BASOPHILS NFR BLD AUTO: 0.4 % (ref 0–2)
BUN BLD-MCNC: 10 MG/DL (ref 7–21)
BUN/CREAT SERPL: 33.3 (ref 7–25)
CALCIUM SPEC-SCNC: 8.9 MG/DL (ref 7.7–10)
CHLORIDE SERPL-SCNC: 100 MMOL/L (ref 99–112)
CO2 SERPL-SCNC: 23.5 MMOL/L (ref 24.3–31.9)
CREAT BLD-MCNC: 0.3 MG/DL (ref 0.43–1.29)
DEPRECATED RDW RBC AUTO: 57 FL (ref 37–54)
EOSINOPHIL # BLD AUTO: 0.16 10*3/MM3 (ref 0–0.7)
EOSINOPHIL NFR BLD AUTO: 1.3 % (ref 0–5)
ERYTHROCYTE [DISTWIDTH] IN BLOOD BY AUTOMATED COUNT: 18.9 % (ref 11.5–14.5)
GFR SERPL CREATININE-BSD FRML MDRD: >150 ML/MIN/1.73
GLUCOSE BLD-MCNC: 108 MG/DL (ref 70–110)
GLUCOSE BLDC GLUCOMTR-MCNC: 153 MG/DL (ref 70–130)
GLUCOSE BLDC GLUCOMTR-MCNC: 226 MG/DL (ref 70–130)
GLUCOSE BLDC GLUCOMTR-MCNC: 79 MG/DL (ref 70–130)
HCT VFR BLD AUTO: 36.1 % (ref 42–52)
HGB BLD-MCNC: 11.5 G/DL (ref 14–18)
IMM GRANULOCYTES # BLD: 0.28 10*3/MM3 (ref 0–0.03)
IMM GRANULOCYTES NFR BLD: 2.3 % (ref 0–0.5)
LYMPHOCYTES # BLD AUTO: 3.6 10*3/MM3 (ref 1–3)
LYMPHOCYTES NFR BLD AUTO: 29.1 % (ref 21–51)
MCH RBC QN AUTO: 26.8 PG (ref 27–33)
MCHC RBC AUTO-ENTMCNC: 31.9 G/DL (ref 33–37)
MCV RBC AUTO: 84.1 FL (ref 80–94)
MONOCYTES # BLD AUTO: 1.44 10*3/MM3 (ref 0.1–0.9)
MONOCYTES NFR BLD AUTO: 11.7 % (ref 0–10)
NEUTROPHILS # BLD AUTO: 6.82 10*3/MM3 (ref 1.4–6.5)
NEUTROPHILS NFR BLD AUTO: 55.2 % (ref 30–70)
OSMOLALITY SERPL CALC.SUM OF ELEC: 269.7 MOSM/KG (ref 273–305)
PLATELET # BLD AUTO: 215 10*3/MM3 (ref 130–400)
PMV BLD AUTO: 10 FL (ref 6–10)
POTASSIUM BLD-SCNC: 3.6 MMOL/L (ref 3.5–5.3)
RBC # BLD AUTO: 4.29 10*6/MM3 (ref 4.7–6.1)
SODIUM BLD-SCNC: 135 MMOL/L (ref 135–153)
WBC NRBC COR # BLD: 12.35 10*3/MM3 (ref 4.5–12.5)

## 2018-05-03 PROCEDURE — 87040 BLOOD CULTURE FOR BACTERIA: CPT | Performed by: INTERNAL MEDICINE

## 2018-05-03 PROCEDURE — 82962 GLUCOSE BLOOD TEST: CPT

## 2018-05-03 PROCEDURE — 80048 BASIC METABOLIC PNL TOTAL CA: CPT | Performed by: INTERNAL MEDICINE

## 2018-05-03 PROCEDURE — 71045 X-RAY EXAM CHEST 1 VIEW: CPT | Performed by: RADIOLOGY

## 2018-05-03 PROCEDURE — 85025 COMPLETE CBC W/AUTO DIFF WBC: CPT | Performed by: INTERNAL MEDICINE

## 2018-05-03 PROCEDURE — 71045 X-RAY EXAM CHEST 1 VIEW: CPT

## 2018-05-03 PROCEDURE — 99291 CRITICAL CARE FIRST HOUR: CPT | Performed by: INTERNAL MEDICINE

## 2018-05-04 ENCOUNTER — OUTSIDE FACILITY SERVICE (OUTPATIENT)
Dept: PULMONOLOGY | Facility: CLINIC | Age: 43
End: 2018-05-04

## 2018-05-04 LAB
A-A DO2: 140 MMHG (ref 0–300)
AMORPH URATE CRY URNS QL MICRO: ABNORMAL /HPF
ANION GAP SERPL CALCULATED.3IONS-SCNC: 9.8 MMOL/L (ref 3.6–11.2)
ATMOSPHERIC PRESS: 732 MMHG
BACTERIA UR QL AUTO: ABNORMAL /HPF
BASE EXCESS BLDV CALC-SCNC: 4.3 MMOL/L
BDY SITE: ABNORMAL
BILIRUB UR QL STRIP: NEGATIVE
BODY TEMPERATURE: 98.6 C
BUN BLD-MCNC: 9 MG/DL (ref 7–21)
BUN/CREAT SERPL: 21.4 (ref 7–25)
CALCIUM SPEC-SCNC: 9.3 MG/DL (ref 7.7–10)
CHLORIDE SERPL-SCNC: 94 MMOL/L (ref 99–112)
CLARITY UR: ABNORMAL
CO2 SERPL-SCNC: 29.2 MMOL/L (ref 24.3–31.9)
COLOR UR: YELLOW
CREAT BLD-MCNC: 0.42 MG/DL (ref 0.43–1.29)
DEPRECATED RDW RBC AUTO: 53.8 FL (ref 37–54)
ERYTHROCYTE [DISTWIDTH] IN BLOOD BY AUTOMATED COUNT: 18.6 % (ref 11.5–14.5)
GFR SERPL CREATININE-BSD FRML MDRD: >150 ML/MIN/1.73
GLUCOSE BLD-MCNC: 204 MG/DL (ref 70–110)
GLUCOSE BLDC GLUCOMTR-MCNC: 154 MG/DL (ref 70–130)
GLUCOSE BLDC GLUCOMTR-MCNC: 169 MG/DL (ref 70–130)
GLUCOSE BLDC GLUCOMTR-MCNC: 204 MG/DL (ref 70–130)
GLUCOSE BLDC GLUCOMTR-MCNC: 250 MG/DL (ref 70–130)
GLUCOSE UR STRIP-MCNC: NEGATIVE MG/DL
HCO3 BLDV-SCNC: 26.6 MMOL/L
HCT VFR BLD AUTO: 33.3 % (ref 42–52)
HGB BLD-MCNC: 10.8 G/DL (ref 14–18)
HGB BLDA-MCNC: 11.5 G/DL (ref 12–16)
HGB UR QL STRIP.AUTO: ABNORMAL
HOROWITZ INDEX BLD+IHG-RTO: 35 %
HYALINE CASTS UR QL AUTO: ABNORMAL /LPF
KETONES UR QL STRIP: NEGATIVE
LEUKOCYTE ESTERASE UR QL STRIP.AUTO: ABNORMAL
MAGNESIUM SERPL-MCNC: 1.8 MG/DL (ref 1.7–2.6)
MAGNESIUM SERPL-MCNC: 2.6 MG/DL (ref 1.7–2.6)
MCH RBC QN AUTO: 26.7 PG (ref 27–33)
MCHC RBC AUTO-ENTMCNC: 32.4 G/DL (ref 33–37)
MCV RBC AUTO: 82.2 FL (ref 80–94)
MODALITY: ABNORMAL
NITRITE UR QL STRIP: POSITIVE
OSMOLALITY SERPL CALC.SUM OF ELEC: 270.9 MOSM/KG (ref 273–305)
PCO2 BLDV: 31.8 MM HG
PEEP RESPIRATORY: 8 CM[H2O]
PH BLDV: 7.54 PH UNITS
PH UR STRIP.AUTO: 8.5 [PH] (ref 5–8)
PHOSPHATE SERPL-MCNC: 3.8 MG/DL (ref 2.7–4.5)
PLATELET # BLD AUTO: 212 10*3/MM3 (ref 130–400)
PMV BLD AUTO: 10 FL (ref 6–10)
PO2 BLDV: 62.8 MM HG
POTASSIUM BLD-SCNC: 3.4 MMOL/L (ref 3.5–5.3)
POTASSIUM BLD-SCNC: 4 MMOL/L (ref 3.5–5.3)
PROT UR QL STRIP: ABNORMAL
RBC # BLD AUTO: 4.05 10*6/MM3 (ref 4.7–6.1)
RBC # UR: ABNORMAL /HPF
REF LAB TEST METHOD: ABNORMAL
SAO2 % BLDCOV: 93.7 %
SET MECH RESP RATE: 18
SODIUM BLD-SCNC: 133 MMOL/L (ref 135–153)
SP GR UR STRIP: 1.02 (ref 1–1.03)
SQUAMOUS #/AREA URNS HPF: ABNORMAL /HPF
UROBILINOGEN UR QL STRIP: ABNORMAL
VENTILATOR MODE: AC
VT ON VENT VENT: 380 ML
WBC NRBC COR # BLD: 10.54 10*3/MM3 (ref 4.5–12.5)
WBC UR QL AUTO: ABNORMAL /HPF

## 2018-05-04 PROCEDURE — 87086 URINE CULTURE/COLONY COUNT: CPT | Performed by: INTERNAL MEDICINE

## 2018-05-04 PROCEDURE — 84132 ASSAY OF SERUM POTASSIUM: CPT | Performed by: INTERNAL MEDICINE

## 2018-05-04 PROCEDURE — 83735 ASSAY OF MAGNESIUM: CPT | Performed by: INTERNAL MEDICINE

## 2018-05-04 PROCEDURE — 82962 GLUCOSE BLOOD TEST: CPT

## 2018-05-04 PROCEDURE — 82803 BLOOD GASES ANY COMBINATION: CPT | Performed by: INTERNAL MEDICINE

## 2018-05-04 PROCEDURE — 99291 CRITICAL CARE FIRST HOUR: CPT | Performed by: INTERNAL MEDICINE

## 2018-05-04 PROCEDURE — 81001 URINALYSIS AUTO W/SCOPE: CPT | Performed by: INTERNAL MEDICINE

## 2018-05-04 PROCEDURE — 87077 CULTURE AEROBIC IDENTIFY: CPT | Performed by: INTERNAL MEDICINE

## 2018-05-04 PROCEDURE — 85027 COMPLETE CBC AUTOMATED: CPT | Performed by: INTERNAL MEDICINE

## 2018-05-04 PROCEDURE — 84100 ASSAY OF PHOSPHORUS: CPT | Performed by: INTERNAL MEDICINE

## 2018-05-04 PROCEDURE — 87070 CULTURE OTHR SPECIMN AEROBIC: CPT | Performed by: INTERNAL MEDICINE

## 2018-05-04 PROCEDURE — 87205 SMEAR GRAM STAIN: CPT | Performed by: INTERNAL MEDICINE

## 2018-05-04 PROCEDURE — 87186 SC STD MICRODIL/AGAR DIL: CPT | Performed by: INTERNAL MEDICINE

## 2018-05-04 PROCEDURE — 80048 BASIC METABOLIC PNL TOTAL CA: CPT | Performed by: INTERNAL MEDICINE

## 2018-05-05 LAB
ANION GAP SERPL CALCULATED.3IONS-SCNC: 8.4 MMOL/L (ref 3.6–11.2)
BASOPHILS # BLD AUTO: 0.04 10*3/MM3 (ref 0–0.3)
BASOPHILS NFR BLD AUTO: 0.3 % (ref 0–2)
BUN BLD-MCNC: 9 MG/DL (ref 7–21)
BUN/CREAT SERPL: 24.3 (ref 7–25)
CALCIUM SPEC-SCNC: 9.6 MG/DL (ref 7.7–10)
CHLORIDE SERPL-SCNC: 96 MMOL/L (ref 99–112)
CO2 SERPL-SCNC: 26.6 MMOL/L (ref 24.3–31.9)
CREAT BLD-MCNC: 0.37 MG/DL (ref 0.43–1.29)
CRP SERPL-MCNC: 2.18 MG/DL (ref 0–0.99)
DEPRECATED RDW RBC AUTO: 53.4 FL (ref 37–54)
EOSINOPHIL # BLD AUTO: 0.01 10*3/MM3 (ref 0–0.7)
EOSINOPHIL NFR BLD AUTO: 0.1 % (ref 0–5)
ERYTHROCYTE [DISTWIDTH] IN BLOOD BY AUTOMATED COUNT: 18.3 % (ref 11.5–14.5)
GFR SERPL CREATININE-BSD FRML MDRD: >150 ML/MIN/1.73
GLUCOSE BLD-MCNC: 143 MG/DL (ref 70–110)
GLUCOSE BLDC GLUCOMTR-MCNC: 159 MG/DL (ref 70–130)
GLUCOSE BLDC GLUCOMTR-MCNC: 178 MG/DL (ref 70–130)
GLUCOSE BLDC GLUCOMTR-MCNC: 204 MG/DL (ref 70–130)
GLUCOSE BLDC GLUCOMTR-MCNC: 274 MG/DL (ref 70–130)
HCT VFR BLD AUTO: 33.1 % (ref 42–52)
HGB BLD-MCNC: 10.6 G/DL (ref 14–18)
IMM GRANULOCYTES # BLD: 0.15 10*3/MM3 (ref 0–0.03)
IMM GRANULOCYTES NFR BLD: 1.2 % (ref 0–0.5)
LYMPHOCYTES # BLD AUTO: 1.53 10*3/MM3 (ref 1–3)
LYMPHOCYTES NFR BLD AUTO: 11.9 % (ref 21–51)
MAGNESIUM SERPL-MCNC: 2.3 MG/DL (ref 1.7–2.6)
MCH RBC QN AUTO: 26.4 PG (ref 27–33)
MCHC RBC AUTO-ENTMCNC: 32 G/DL (ref 33–37)
MCV RBC AUTO: 82.5 FL (ref 80–94)
MONOCYTES # BLD AUTO: 1.27 10*3/MM3 (ref 0.1–0.9)
MONOCYTES NFR BLD AUTO: 9.9 % (ref 0–10)
NEUTROPHILS # BLD AUTO: 9.87 10*3/MM3 (ref 1.4–6.5)
NEUTROPHILS NFR BLD AUTO: 76.6 % (ref 30–70)
OSMOLALITY SERPL CALC.SUM OF ELEC: 263.8 MOSM/KG (ref 273–305)
PHOSPHATE SERPL-MCNC: 2.8 MG/DL (ref 2.7–4.5)
PLATELET # BLD AUTO: 201 10*3/MM3 (ref 130–400)
PMV BLD AUTO: 10.1 FL (ref 6–10)
POTASSIUM BLD-SCNC: 4.1 MMOL/L (ref 3.5–5.3)
RBC # BLD AUTO: 4.01 10*6/MM3 (ref 4.7–6.1)
SODIUM BLD-SCNC: 131 MMOL/L (ref 135–153)
VANCOMYCIN TROUGH SERPL-MCNC: 17.3 MCG/ML (ref 5–15)
WBC NRBC COR # BLD: 12.87 10*3/MM3 (ref 4.5–12.5)

## 2018-05-05 PROCEDURE — 80048 BASIC METABOLIC PNL TOTAL CA: CPT | Performed by: INTERNAL MEDICINE

## 2018-05-05 PROCEDURE — 85025 COMPLETE CBC W/AUTO DIFF WBC: CPT | Performed by: INTERNAL MEDICINE

## 2018-05-05 PROCEDURE — 84100 ASSAY OF PHOSPHORUS: CPT | Performed by: INTERNAL MEDICINE

## 2018-05-05 PROCEDURE — 86140 C-REACTIVE PROTEIN: CPT | Performed by: NURSE PRACTITIONER

## 2018-05-05 PROCEDURE — 82962 GLUCOSE BLOOD TEST: CPT

## 2018-05-05 PROCEDURE — 80202 ASSAY OF VANCOMYCIN: CPT | Performed by: INTERNAL MEDICINE

## 2018-05-05 PROCEDURE — 83735 ASSAY OF MAGNESIUM: CPT | Performed by: INTERNAL MEDICINE

## 2018-05-06 LAB
ANION GAP SERPL CALCULATED.3IONS-SCNC: 6.6 MMOL/L (ref 3.6–11.2)
ATMOSPHERIC PRESS: 729 MMHG
BACTERIA SPEC RESP CULT: ABNORMAL
BACTERIA SPEC RESP CULT: ABNORMAL
BASE EXCESS BLDV CALC-SCNC: 2.5 MMOL/L
BASOPHILS # BLD AUTO: 0.02 10*3/MM3 (ref 0–0.3)
BASOPHILS NFR BLD AUTO: 0.2 % (ref 0–2)
BDY SITE: ABNORMAL
BODY TEMPERATURE: 98.6 C
BUN BLD-MCNC: 10 MG/DL (ref 7–21)
BUN/CREAT SERPL: 27 (ref 7–25)
CALCIUM SPEC-SCNC: 9.2 MG/DL (ref 7.7–10)
CHLORIDE SERPL-SCNC: 95 MMOL/L (ref 99–112)
CO2 SERPL-SCNC: 27.4 MMOL/L (ref 24.3–31.9)
CREAT BLD-MCNC: 0.37 MG/DL (ref 0.43–1.29)
CRP SERPL-MCNC: 1.32 MG/DL (ref 0–0.99)
DEPRECATED RDW RBC AUTO: 51.5 FL (ref 37–54)
EOSINOPHIL # BLD AUTO: 0.01 10*3/MM3 (ref 0–0.7)
EOSINOPHIL NFR BLD AUTO: 0.1 % (ref 0–5)
ERYTHROCYTE [DISTWIDTH] IN BLOOD BY AUTOMATED COUNT: 17.8 % (ref 11.5–14.5)
GFR SERPL CREATININE-BSD FRML MDRD: >150 ML/MIN/1.73
GLUCOSE BLD-MCNC: 178 MG/DL (ref 70–110)
GLUCOSE BLDC GLUCOMTR-MCNC: 185 MG/DL (ref 70–130)
GLUCOSE BLDC GLUCOMTR-MCNC: 220 MG/DL (ref 70–130)
GRAM STN SPEC: ABNORMAL
HCO3 BLDV-SCNC: 25.6 MMOL/L
HCT VFR BLD AUTO: 30.8 % (ref 42–52)
HGB BLD-MCNC: 10 G/DL (ref 14–18)
HGB BLDA-MCNC: 10.7 G/DL (ref 12–16)
HOROWITZ INDEX BLD+IHG-RTO: 35 %
IMM GRANULOCYTES # BLD: 0.09 10*3/MM3 (ref 0–0.03)
IMM GRANULOCYTES NFR BLD: 0.7 % (ref 0–0.5)
LYMPHOCYTES # BLD AUTO: 1.07 10*3/MM3 (ref 1–3)
LYMPHOCYTES NFR BLD AUTO: 8.6 % (ref 21–51)
MAGNESIUM SERPL-MCNC: 2.1 MG/DL (ref 1.7–2.6)
MCH RBC QN AUTO: 27 PG (ref 27–33)
MCHC RBC AUTO-ENTMCNC: 32.5 G/DL (ref 33–37)
MCV RBC AUTO: 83 FL (ref 80–94)
MODALITY: ABNORMAL
MONOCYTES # BLD AUTO: 0.96 10*3/MM3 (ref 0.1–0.9)
MONOCYTES NFR BLD AUTO: 7.7 % (ref 0–10)
NEUTROPHILS # BLD AUTO: 10.31 10*3/MM3 (ref 1.4–6.5)
NEUTROPHILS NFR BLD AUTO: 82.7 % (ref 30–70)
OSMOLALITY SERPL CALC.SUM OF ELEC: 262.4 MOSM/KG (ref 273–305)
PCO2 BLDV: 34.2 MM HG
PEEP RESPIRATORY: 8 CM[H2O]
PH BLDV: 7.49 PH UNITS
PHOSPHATE SERPL-MCNC: 2.3 MG/DL (ref 2.7–4.5)
PLATELET # BLD AUTO: 176 10*3/MM3 (ref 130–400)
PMV BLD AUTO: 10 FL (ref 6–10)
PO2 BLDV: 0 MM HG
POTASSIUM BLD-SCNC: 3.9 MMOL/L (ref 3.5–5.3)
RBC # BLD AUTO: 3.71 10*6/MM3 (ref 4.7–6.1)
SAO2 % BLDCOV: 97.6 %
SET MECH RESP RATE: 18
SODIUM BLD-SCNC: 129 MMOL/L (ref 135–153)
VENTILATOR MODE: AC
VT ON VENT VENT: 380 ML
WBC NRBC COR # BLD: 12.46 10*3/MM3 (ref 4.5–12.5)

## 2018-05-06 PROCEDURE — 82805 BLOOD GASES W/O2 SATURATION: CPT | Performed by: INTERNAL MEDICINE

## 2018-05-06 PROCEDURE — 86140 C-REACTIVE PROTEIN: CPT | Performed by: NURSE PRACTITIONER

## 2018-05-06 PROCEDURE — 87040 BLOOD CULTURE FOR BACTERIA: CPT | Performed by: INTERNAL MEDICINE

## 2018-05-06 PROCEDURE — 84100 ASSAY OF PHOSPHORUS: CPT | Performed by: INTERNAL MEDICINE

## 2018-05-06 PROCEDURE — 85025 COMPLETE CBC W/AUTO DIFF WBC: CPT | Performed by: PHYSICIAN ASSISTANT

## 2018-05-06 PROCEDURE — 80048 BASIC METABOLIC PNL TOTAL CA: CPT | Performed by: INTERNAL MEDICINE

## 2018-05-06 PROCEDURE — 82962 GLUCOSE BLOOD TEST: CPT

## 2018-05-06 PROCEDURE — 83735 ASSAY OF MAGNESIUM: CPT | Performed by: INTERNAL MEDICINE

## 2018-05-07 LAB
A-A DO2: 128.6 MMHG (ref 0–300)
ANION GAP SERPL CALCULATED.3IONS-SCNC: 8.1 MMOL/L (ref 3.6–11.2)
ATMOSPHERIC PRESS: 725 MMHG
BASE EXCESS BLDV CALC-SCNC: 1.9 MMOL/L
BASOPHILS # BLD AUTO: 0.06 10*3/MM3 (ref 0–0.3)
BASOPHILS NFR BLD AUTO: 0.7 % (ref 0–2)
BDY SITE: ABNORMAL
BODY TEMPERATURE: 98.6 C
BUN BLD-MCNC: 8 MG/DL (ref 7–21)
BUN/CREAT SERPL: 22.9 (ref 7–25)
CALCIUM SPEC-SCNC: 9.4 MG/DL (ref 7.7–10)
CHLORIDE SERPL-SCNC: 92 MMOL/L (ref 99–112)
CO2 SERPL-SCNC: 26.9 MMOL/L (ref 24.3–31.9)
CREAT BLD-MCNC: 0.35 MG/DL (ref 0.43–1.29)
CRP SERPL-MCNC: 2.22 MG/DL (ref 0–0.99)
DEPRECATED RDW RBC AUTO: 50.9 FL (ref 37–54)
EOSINOPHIL # BLD AUTO: 0.07 10*3/MM3 (ref 0–0.7)
EOSINOPHIL NFR BLD AUTO: 0.8 % (ref 0–5)
ERYTHROCYTE [DISTWIDTH] IN BLOOD BY AUTOMATED COUNT: 17.6 % (ref 11.5–14.5)
GFR SERPL CREATININE-BSD FRML MDRD: >150 ML/MIN/1.73
GLUCOSE BLD-MCNC: 146 MG/DL (ref 70–110)
GLUCOSE BLDC GLUCOMTR-MCNC: 153 MG/DL (ref 70–130)
GLUCOSE BLDC GLUCOMTR-MCNC: 184 MG/DL (ref 70–130)
GLUCOSE BLDC GLUCOMTR-MCNC: 206 MG/DL (ref 70–130)
GLUCOSE BLDC GLUCOMTR-MCNC: 243 MG/DL (ref 70–130)
HCO3 BLDV-SCNC: 25.5 MMOL/L
HCT VFR BLD AUTO: 31.1 % (ref 42–52)
HGB BLD-MCNC: 10.1 G/DL (ref 14–18)
HGB BLDA-MCNC: 10.9 G/DL (ref 12–16)
HOROWITZ INDEX BLD+IHG-RTO: 35 %
IMM GRANULOCYTES # BLD: 0.08 10*3/MM3 (ref 0–0.03)
IMM GRANULOCYTES NFR BLD: 1 % (ref 0–0.5)
LYMPHOCYTES # BLD AUTO: 2 10*3/MM3 (ref 1–3)
LYMPHOCYTES NFR BLD AUTO: 24.3 % (ref 21–51)
MCH RBC QN AUTO: 26.9 PG (ref 27–33)
MCHC RBC AUTO-ENTMCNC: 32.5 G/DL (ref 33–37)
MCV RBC AUTO: 82.7 FL (ref 80–94)
MODALITY: ABNORMAL
MONOCYTES # BLD AUTO: 1.26 10*3/MM3 (ref 0.1–0.9)
MONOCYTES NFR BLD AUTO: 15.3 % (ref 0–10)
NEUTROPHILS # BLD AUTO: 4.77 10*3/MM3 (ref 1.4–6.5)
NEUTROPHILS NFR BLD AUTO: 57.9 % (ref 30–70)
OSMOLALITY SERPL CALC.SUM OF ELEC: 256.2 MOSM/KG (ref 273–305)
PCO2 BLDV: 36 MM HG
PEEP RESPIRATORY: 8 CM[H2O]
PH BLDV: 7.47 PH UNITS
PLATELET # BLD AUTO: 159 10*3/MM3 (ref 130–400)
PMV BLD AUTO: 9.9 FL (ref 6–10)
PO2 BLDV: 66.9 MM HG
POTASSIUM BLD-SCNC: 3.5 MMOL/L (ref 3.5–5.3)
RBC # BLD AUTO: 3.76 10*6/MM3 (ref 4.7–6.1)
SAO2 % BLDCOV: 94.2 %
SET MECH RESP RATE: 15
SODIUM BLD-SCNC: 127 MMOL/L (ref 135–153)
VANCOMYCIN TROUGH SERPL-MCNC: 15.5 MCG/ML (ref 5–15)
VENTILATOR MODE: AC
VT ON VENT VENT: 350 ML
WBC NRBC COR # BLD: 8.24 10*3/MM3 (ref 4.5–12.5)

## 2018-05-07 PROCEDURE — 86140 C-REACTIVE PROTEIN: CPT | Performed by: NURSE PRACTITIONER

## 2018-05-07 PROCEDURE — 80048 BASIC METABOLIC PNL TOTAL CA: CPT | Performed by: INTERNAL MEDICINE

## 2018-05-07 PROCEDURE — 93010 ELECTROCARDIOGRAM REPORT: CPT | Performed by: INTERNAL MEDICINE

## 2018-05-07 PROCEDURE — 82803 BLOOD GASES ANY COMBINATION: CPT | Performed by: INTERNAL MEDICINE

## 2018-05-07 PROCEDURE — 93005 ELECTROCARDIOGRAM TRACING: CPT | Performed by: INTERNAL MEDICINE

## 2018-05-07 PROCEDURE — 80202 ASSAY OF VANCOMYCIN: CPT | Performed by: INTERNAL MEDICINE

## 2018-05-07 PROCEDURE — 85025 COMPLETE CBC W/AUTO DIFF WBC: CPT | Performed by: INTERNAL MEDICINE

## 2018-05-07 PROCEDURE — 82962 GLUCOSE BLOOD TEST: CPT

## 2018-05-08 ENCOUNTER — APPOINTMENT (OUTPATIENT)
Dept: CT IMAGING | Facility: HOSPITAL | Age: 43
End: 2018-05-08

## 2018-05-08 LAB
A-A DO2: 126.7 MMHG (ref 0–300)
AMYLASE SERPL-CCNC: 61 U/L (ref 28–100)
ANION GAP SERPL CALCULATED.3IONS-SCNC: 10.8 MMOL/L (ref 3.6–11.2)
ATMOSPHERIC PRESS: 725 MMHG
BACTERIA SPEC AEROBE CULT: NORMAL
BACTERIA SPEC AEROBE CULT: NORMAL
BACTERIA SPEC RESP CULT: ABNORMAL
BACTERIA SPEC RESP CULT: ABNORMAL
BASE EXCESS BLDV CALC-SCNC: 1.8 MMOL/L
BDY SITE: NORMAL
BODY TEMPERATURE: 98.6 C
BUN BLD-MCNC: 9 MG/DL (ref 7–21)
BUN/CREAT SERPL: 23.7 (ref 7–25)
CALCIUM SPEC-SCNC: 9.2 MG/DL (ref 7.7–10)
CHLORIDE SERPL-SCNC: 94 MMOL/L (ref 99–112)
CO2 SERPL-SCNC: 23.2 MMOL/L (ref 24.3–31.9)
CREAT BLD-MCNC: 0.38 MG/DL (ref 0.43–1.29)
CRP SERPL-MCNC: 1.28 MG/DL (ref 0–0.99)
D-LACTATE SERPL-SCNC: 1.1 MMOL/L (ref 0.5–2)
D-LACTATE SERPL-SCNC: 2.3 MMOL/L (ref 0.5–2)
DEPRECATED RDW RBC AUTO: 52 FL (ref 37–54)
ERYTHROCYTE [DISTWIDTH] IN BLOOD BY AUTOMATED COUNT: 17.7 % (ref 11.5–14.5)
GFR SERPL CREATININE-BSD FRML MDRD: >150 ML/MIN/1.73
GLUCOSE BLD-MCNC: 148 MG/DL (ref 70–110)
GLUCOSE BLDC GLUCOMTR-MCNC: 147 MG/DL (ref 70–130)
GLUCOSE BLDC GLUCOMTR-MCNC: 156 MG/DL (ref 70–130)
GLUCOSE BLDC GLUCOMTR-MCNC: 160 MG/DL (ref 70–130)
GRAM STN SPEC: ABNORMAL
GRAM STN SPEC: ABNORMAL
HCO3 BLDV-SCNC: 25.6 MMOL/L
HCT VFR BLD AUTO: 34.5 % (ref 42–52)
HGB BLD-MCNC: 11.2 G/DL (ref 14–18)
HGB BLDA-MCNC: 12 G/DL (ref 12–16)
HOLD SPECIMEN: NORMAL
HOROWITZ INDEX BLD+IHG-RTO: 35 %
LIPASE SERPL-CCNC: 84 U/L (ref 13–60)
MAGNESIUM SERPL-MCNC: 2 MG/DL (ref 1.7–2.6)
MCH RBC QN AUTO: 26.9 PG (ref 27–33)
MCHC RBC AUTO-ENTMCNC: 32.5 G/DL (ref 33–37)
MCV RBC AUTO: 82.9 FL (ref 80–94)
MODALITY: NORMAL
OSMOLALITY SERPL CALC.SUM OF ELEC: 258.5 MOSM/KG (ref 273–305)
PCO2 BLDV: 37.3 MM HG
PEEP RESPIRATORY: 5 CM[H2O]
PH BLDV: 7.45 PH UNITS
PHOSPHATE SERPL-MCNC: 2.4 MG/DL (ref 2.7–4.5)
PLATELET # BLD AUTO: 200 10*3/MM3 (ref 130–400)
PMV BLD AUTO: 10.5 FL (ref 6–10)
PO2 BLDV: 67.2 MM HG
POTASSIUM BLD-SCNC: 4 MMOL/L (ref 3.5–5.3)
RBC # BLD AUTO: 4.16 10*6/MM3 (ref 4.7–6.1)
SAO2 % BLDCOV: 94.4 %
SET MECH RESP RATE: 14
SODIUM BLD-SCNC: 128 MMOL/L (ref 135–153)
VENTILATOR MODE: AC
VT ON VENT VENT: 350 ML
WBC NRBC COR # BLD: 8.11 10*3/MM3 (ref 4.5–12.5)

## 2018-05-08 PROCEDURE — 85027 COMPLETE CBC AUTOMATED: CPT | Performed by: INTERNAL MEDICINE

## 2018-05-08 PROCEDURE — 82150 ASSAY OF AMYLASE: CPT | Performed by: PHYSICIAN ASSISTANT

## 2018-05-08 PROCEDURE — 84100 ASSAY OF PHOSPHORUS: CPT | Performed by: INTERNAL MEDICINE

## 2018-05-08 PROCEDURE — 83605 ASSAY OF LACTIC ACID: CPT | Performed by: PHYSICIAN ASSISTANT

## 2018-05-08 PROCEDURE — 86140 C-REACTIVE PROTEIN: CPT | Performed by: NURSE PRACTITIONER

## 2018-05-08 PROCEDURE — 80048 BASIC METABOLIC PNL TOTAL CA: CPT | Performed by: INTERNAL MEDICINE

## 2018-05-08 PROCEDURE — 83690 ASSAY OF LIPASE: CPT | Performed by: PHYSICIAN ASSISTANT

## 2018-05-08 PROCEDURE — 82962 GLUCOSE BLOOD TEST: CPT

## 2018-05-08 PROCEDURE — 83735 ASSAY OF MAGNESIUM: CPT | Performed by: INTERNAL MEDICINE

## 2018-05-08 PROCEDURE — 82803 BLOOD GASES ANY COMBINATION: CPT | Performed by: INTERNAL MEDICINE

## 2018-05-11 LAB
BACTERIA SPEC AEROBE CULT: NORMAL
BACTERIA SPEC AEROBE CULT: NORMAL

## 2018-05-14 LAB
BACTERIA SPEC AEROBE CULT: ABNORMAL
GRAM STN SPEC: ABNORMAL
GRAM STN SPEC: ABNORMAL

## (undated) DEVICE — Device

## (undated) DEVICE — BIOPATCH™ ANTIMICROBIAL DRESSING WITH CHLORHEXIDINE GLUCONATE IS A HYDROPHILLIC POLYURETHANE ABSORPTIVE FOAM WITH CHLORHEXIDINE GLUCONATE (CHG) WHICH INHIBITS BACTERIAL GROWTH UNDER THE DRESSING. THE DRESSING IS INTENDED TO BE USED TO ABSORB EXUDATE, COVER A WOUND CAUSED BY VASCULAR AND NONVASCULAR PERCUTANEOUS MEDICAL DEVICES DURING SURGERY, AS WELL AS REDUCE LOCAL INFECTION AND COLONIZATION OF MICROORGANISMS.: Brand: BIOPATCH

## (undated) DEVICE — SUT VIC 2/0 TIES 18IN J111T

## (undated) DEVICE — INTENDED FOR TISSUE SEPARATION, AND OTHER PROCEDURES THAT REQUIRE A SHARP SURGICAL BLADE TO PUNCTURE OR CUT.: Brand: BARD-PARKER ® CARBON RIB-BACK BLADES

## (undated) DEVICE — DRAINBAG,ANTI-REFLUX TOWER,L/F,2000ML,LL: Brand: MEDLINE

## (undated) DEVICE — SPNG DRN AMD EXCILON 6PLY 4X4IN PK/2

## (undated) DEVICE — SPNG LAP PREWSH SFTPK 18X18IN STRL PK/5

## (undated) DEVICE — ENCORE® LATEX MICRO SIZE 7.5, STERILE LATEX POWDER-FREE SURGICAL GLOVE: Brand: ENCORE

## (undated) DEVICE — PK HD AND NK 70

## (undated) DEVICE — SUT PROLN 2/0 PC3 8833H

## (undated) DEVICE — JELLY,LUBE,STERILE,FLIP TOP,TUBE,4-OZ: Brand: MEDLINE

## (undated) DEVICE — SUT VIC 3/0 SH 27IN J416H

## (undated) DEVICE — SYR 3CC SFTY GLD 25G 5/8IN

## (undated) DEVICE — ENCORE® LATEX MICRO SIZE 8, STERILE LATEX POWDER-FREE SURGICAL GLOVE: Brand: ENCORE

## (undated) DEVICE — HOLDER: Brand: DEROYAL